# Patient Record
Sex: FEMALE | Race: WHITE | NOT HISPANIC OR LATINO | URBAN - METROPOLITAN AREA
[De-identification: names, ages, dates, MRNs, and addresses within clinical notes are randomized per-mention and may not be internally consistent; named-entity substitution may affect disease eponyms.]

---

## 2022-01-01 ENCOUNTER — TRANSFERRED RECORDS (OUTPATIENT)
Dept: HEALTH INFORMATION MANAGEMENT | Facility: CLINIC | Age: 47
End: 2022-01-01

## 2023-01-16 ASSESSMENT — ANXIETY QUESTIONNAIRES
2. NOT BEING ABLE TO STOP OR CONTROL WORRYING: NEARLY EVERY DAY
IF YOU CHECKED OFF ANY PROBLEMS ON THIS QUESTIONNAIRE, HOW DIFFICULT HAVE THESE PROBLEMS MADE IT FOR YOU TO DO YOUR WORK, TAKE CARE OF THINGS AT HOME, OR GET ALONG WITH OTHER PEOPLE: SOMEWHAT DIFFICULT
3. WORRYING TOO MUCH ABOUT DIFFERENT THINGS: NEARLY EVERY DAY
GAD7 TOTAL SCORE: 16
7. FEELING AFRAID AS IF SOMETHING AWFUL MIGHT HAPPEN: NEARLY EVERY DAY
5. BEING SO RESTLESS THAT IT IS HARD TO SIT STILL: SEVERAL DAYS
4. TROUBLE RELAXING: MORE THAN HALF THE DAYS
6. BECOMING EASILY ANNOYED OR IRRITABLE: SEVERAL DAYS
1. FEELING NERVOUS, ANXIOUS, OR ON EDGE: NEARLY EVERY DAY

## 2023-01-16 ASSESSMENT — PATIENT HEALTH QUESTIONNAIRE - PHQ9: SUM OF ALL RESPONSES TO PHQ QUESTIONS 1-9: 11

## 2023-01-17 ASSESSMENT — ANXIETY QUESTIONNAIRES
3. WORRYING TOO MUCH ABOUT DIFFERENT THINGS: NEARLY EVERY DAY
5. BEING SO RESTLESS THAT IT IS HARD TO SIT STILL: SEVERAL DAYS
GAD7 TOTAL SCORE: 16
8. IF YOU CHECKED OFF ANY PROBLEMS, HOW DIFFICULT HAVE THESE MADE IT FOR YOU TO DO YOUR WORK, TAKE CARE OF THINGS AT HOME, OR GET ALONG WITH OTHER PEOPLE?: SOMEWHAT DIFFICULT
2. NOT BEING ABLE TO STOP OR CONTROL WORRYING: NEARLY EVERY DAY
GAD7 TOTAL SCORE: 16
6. BECOMING EASILY ANNOYED OR IRRITABLE: SEVERAL DAYS
IF YOU CHECKED OFF ANY PROBLEMS ON THIS QUESTIONNAIRE, HOW DIFFICULT HAVE THESE PROBLEMS MADE IT FOR YOU TO DO YOUR WORK, TAKE CARE OF THINGS AT HOME, OR GET ALONG WITH OTHER PEOPLE: SOMEWHAT DIFFICULT
4. TROUBLE RELAXING: MORE THAN HALF THE DAYS
GAD7 TOTAL SCORE: 16
1. FEELING NERVOUS, ANXIOUS, OR ON EDGE: NEARLY EVERY DAY
7. FEELING AFRAID AS IF SOMETHING AWFUL MIGHT HAPPEN: NEARLY EVERY DAY
7. FEELING AFRAID AS IF SOMETHING AWFUL MIGHT HAPPEN: NEARLY EVERY DAY

## 2023-01-17 ASSESSMENT — PATIENT HEALTH QUESTIONNAIRE - PHQ9
SUM OF ALL RESPONSES TO PHQ QUESTIONS 1-9: 11
10. IF YOU CHECKED OFF ANY PROBLEMS, HOW DIFFICULT HAVE THESE PROBLEMS MADE IT FOR YOU TO DO YOUR WORK, TAKE CARE OF THINGS AT HOME, OR GET ALONG WITH OTHER PEOPLE: SOMEWHAT DIFFICULT
SUM OF ALL RESPONSES TO PHQ QUESTIONS 1-9: 11

## 2023-01-18 PROBLEM — J30.2 SEASONAL ALLERGIC RHINITIS: Status: ACTIVE | Noted: 2023-01-18

## 2023-01-18 RX ORDER — SERTRALINE HYDROCHLORIDE 100 MG/1
100 TABLET, FILM COATED ORAL DAILY
COMMUNITY
End: 2023-01-19

## 2023-01-18 RX ORDER — VALACYCLOVIR HYDROCHLORIDE 500 MG/1
TABLET, FILM COATED ORAL
COMMUNITY
End: 2023-01-19

## 2023-01-18 NOTE — PROGRESS NOTES
"CC: Establish Care (Medication refill for Zoloft, establish care.//Patient has mass/lump underneath left arm after COVID vaccine. Curious about next steps.)      SUBJECTIVE: Marielos is a 47 year old female who comes in to [facility] care. Prior PCP in [place] Records received and reviewed.Recent move from [facility] in December 2021. She works as a  at law firm. Concerns today:     Axillary lump in left axilla.   She noticed this after her first Covid vaccine.  Then, she went to have a routine mammogram. They biopsied the area in Sep 2021 and said it was benign. It will swell whenever she gets a vaccine in that arm. Otherwise not growing or changing but she would like to have this removed.   No breast lumps or masses but is due for her routine mammogram screening.       Anxiety and mild depression. Long-standing diagnosis.   - Medications: Has been treating with sertraline 100 mg daily. No adverse medication effects.   - Counseling: Patient is not currently seeing a therapist/counselor. But is interested in re-establishing with a therapist.   - Mother is on sertraline as well.   - Past medication trials: escitalopram caused sleepiness.     Genital HSV. Takes valacyclovir 500 mg daily for suppression and working well.     PMH, PSH, FH, allergies, and medications reviewed and updated in Epic.       OBJECTIVE:   /77 (BP Location: Right arm, Patient Position: Sitting, Cuff Size: Adult Regular)   Pulse 67   Temp 97.9  F (36.6  C) (Skin)   Resp 16   Ht 1.575 m (5' 2\")   Wt 63.1 kg (139 lb 1.3 oz)   LMP 01/08/2023 (Exact Date)   SpO2 98%   BMI 25.44 kg/m    Alert and oriented in no acute distress. No right axillary adenopathy or masses, left axilla with a discrete approx 2 cm mass, non-tender, no overlying skin changes.     Answers for HPI/ROS submitted by the patient on 1/17/2023  If you checked off any problems, how difficult have these problems made it for you to do your work, take care of things at home, " or get along with other people?: Somewhat difficult  PHQ9 TOTAL SCORE: 11  MARY 7 TOTAL SCORE: 16      ASSESSMENT/PLAN:   Marielos was seen today for establish care.    Encounter to establish care  PMH, PSH, FH, SH, medications, and allergies reviewed and updated in Epic.   Mammogram ordered - due for screening.     Anxiety and depression  The current medical regimen is effective;  continue present plan and medications. Referral to [facility] for counseling.   -     sertraline (ZOLOFT) 100 MG tablet; Take 1 tablet (100 mg) by mouth daily    Recurrent HSV (herpes simplex virus)  The current medical regimen is effective;  continue present plan and medications.  -     valACYclovir (VALTREX) 500 MG tablet; Take 1 tablet (500 mg) by mouth daily    Axillary lump, left  Will sign MILAGRO to obtain records from imaging and biopsy performed in fall 2021.       Geni Whalen MD  Family Medicine

## 2023-01-19 ENCOUNTER — OFFICE VISIT (OUTPATIENT)
Dept: FAMILY MEDICINE | Facility: CLINIC | Age: 48
End: 2023-01-19
Payer: COMMERCIAL

## 2023-01-19 VITALS
RESPIRATION RATE: 16 BRPM | HEART RATE: 67 BPM | DIASTOLIC BLOOD PRESSURE: 77 MMHG | SYSTOLIC BLOOD PRESSURE: 122 MMHG | BODY MASS INDEX: 25.6 KG/M2 | OXYGEN SATURATION: 98 % | TEMPERATURE: 97.9 F | WEIGHT: 139.08 LBS | HEIGHT: 62 IN

## 2023-01-19 DIAGNOSIS — Z12.31 ENCOUNTER FOR SCREENING MAMMOGRAM FOR BREAST CANCER: ICD-10-CM

## 2023-01-19 DIAGNOSIS — R22.32 AXILLARY LUMP, LEFT: ICD-10-CM

## 2023-01-19 DIAGNOSIS — Z76.89 ENCOUNTER TO ESTABLISH CARE: Primary | ICD-10-CM

## 2023-01-19 DIAGNOSIS — F41.9 ANXIETY AND DEPRESSION: ICD-10-CM

## 2023-01-19 DIAGNOSIS — F32.A ANXIETY AND DEPRESSION: ICD-10-CM

## 2023-01-19 DIAGNOSIS — B00.9 RECURRENT HSV (HERPES SIMPLEX VIRUS): ICD-10-CM

## 2023-01-19 RX ORDER — SERTRALINE HYDROCHLORIDE 100 MG/1
100 TABLET, FILM COATED ORAL DAILY
Qty: 90 TABLET | Refills: 3 | Status: SHIPPED | OUTPATIENT
Start: 2023-01-19 | End: 2024-01-03

## 2023-01-19 RX ORDER — VALACYCLOVIR HYDROCHLORIDE 500 MG/1
500 TABLET, FILM COATED ORAL DAILY
Qty: 90 TABLET | Refills: 3 | Status: SHIPPED | OUTPATIENT
Start: 2023-01-19 | End: 2024-01-11

## 2023-01-19 NOTE — Clinical Note
Hi! Marielos established care today ([place] relatively stable anxiety on sertraline 100 mg daily but dad  last year, uncle  last month, so some recent stressors and she would like to meet with you. She has an appt scheduled. Thanks!

## 2023-01-19 NOTE — NURSING NOTE
"47 year old  Chief Complaint   Patient presents with     Establish Care     Medication refill for Zoloft, establish care.    Patient has mass/lump underneath left arm after COVID vaccine. Curious about next steps.       Blood pressure 122/77, pulse 67, temperature 97.9  F (36.6  C), temperature source Skin, resp. rate 16, height 1.575 m (5' 2\"), weight 63.1 kg (139 lb 1.3 oz), last menstrual period 01/08/2023, SpO2 98 %. Body mass index is 25.44 kg/m .  Patient Active Problem List   Diagnosis     Seasonal allergic rhinitis     Depressive disorder     Herpes simplex       Wt Readings from Last 2 Encounters:   01/19/23 63.1 kg (139 lb 1.3 oz)     BP Readings from Last 3 Encounters:   01/19/23 122/77         Current Outpatient Medications   Medication     sertraline (ZOLOFT) 100 MG tablet     valACYclovir (VALTREX) 500 MG tablet     No current facility-administered medications for this visit.       Social History     Tobacco Use     Smoking status: Never     Smokeless tobacco: Never   Vaping Use     Vaping Use: Never used   Substance Use Topics     Alcohol use: Yes     Comment: Moderate; glass of wine with dinner sometimes. 2-3 in a sitting.     Drug use: Yes     Types: Marijuana     Comment: Edible       Health Maintenance Due   Topic Date Due     YEARLY PREVENTIVE VISIT  Never done     ADVANCE CARE PLANNING  Never done     MAMMO SCREENING  Never done     HEPATITIS B IMMUNIZATION (1 of 3 - 3-dose series) Never done     COLORECTAL CANCER SCREENING  Never done     HIV SCREENING  Never done     HEPATITIS C SCREENING  Never done     PAP  Never done     DTAP/TDAP/TD IMMUNIZATION (2 - Td or Tdap) 02/22/2022     COVID-19 Vaccine (2 - Pfizer series) 10/04/2022     LIPID  Never done       No results found for: PAP      January 19, 2023 11:18 AM    "

## 2023-01-25 NOTE — PROGRESS NOTES
"    [facility]      PATIENT'S NAME: Marielos Marti  PREFERRED NAME: Marielos  PRONOUNS:  she/her and they    MRN: [id number]  : 1975  ADDRESS: [address]  [facility] [address]  ACCT. NUMBER:  [id number]  DATE OF SERVICE: 23  START TIME: 3:31 pm  END TIME: 4:30 pm  PREFERRED PHONE: [phone]  May we leave a program related message: Yes  SERVICE MODALITY:  Video Visit:      Provider verified identity through the following two step process.  Patient provided:  Patient  and Patient address    Telemedicine Visit: The patient's condition can be safely assessed and treated via synchronous audio and visual telemedicine encounter.      Reason for Telemedicine Visit: Patient has requested telehealth visit    Originating Site (Patient Location): Patient's home    Distant Site (Provider Location): [facility]    Consent:  The patient/guardian has verbally consented to: the potential risks and benefits of telemedicine (video visit) versus in person care; bill my insurance or make self-payment for services provided; and responsibility for payment of non-covered services.     Patient would like the video invitation sent by:  My Chart    Mode of Communication:  [facility] Conference [facility]    Distant Location (Provider):  On-site    As the provider I attest to compliance with applicable laws and regulations related to telemedicine.    UNIVERSAL ADULT Mental Health DIAGNOSTIC ASSESSMENT    Identifying Information:  Patient is a 47 year old,   individual.  Patient was referred for an assessment by primary care clinic.  Patient attended the session alone.      Chief Complaint:    The reason for seeking services at this time is: \"Anxiety/depression\".  The problem(s) began 89.   Patient has attempted to resolve these concerns in the past through medication.    When describing her mental health, Marielos stated, \"In the past year its ramped up\", sharing " "she's experienced significant changes/stressors over the past several years, including the pandemic, moving to [place] in December 2021, father passed away in March 2022, her partner's father is experiencing health issues (partner and Marielos are assisting with care), and her uncle recently passed away.   Marielos reported her and her partner are still adjusting to the move and the weather (lacks robust social support system; \"loneliness\"; \"I'm really missing nice weather\"), and sometimes her work lacks a healthy approach to stress management and mental health.  Over the past year, Marielos stated she's constantly tried to keep moving forward and \"I haven't taken time to process anything that happended in the last year\".     Marielos reported history of anxiety and low mood since childhood, stating \"they (the anxiety and depression) have always gotten in the way\".  When describing them, she reported the anxiety and depression are closely linked, but \"its seems like anxiety is the bigger issue\" and the anxiety is often \"the  (of the bus)\".   Marielos believes she experienced depressive episodes in high school, but clearly recalled a depressive episode during her early 20's.  Marielos experienced her first panic attack at age 24 and her most recent occurred in November 2021 (preparing to move across the country).  Marielos reported panic attacks are triggered by very stressful incidents. Marielos reported she first started selective serotonin reuptake inhibitor at age 32 (attempted escitalopram, switched to and continues to take sertraline) and believes the medication is very helpful, stating \"the S.S.R.I's keep it in check (anxiety symptoms)\", explaining how prior to or when not taking medication the anxiety symptoms can \"spiral\", she can feel the symptoms increasing in her body, sometimes \"It feels like a knot\", and becomes overwhelming.      Marielos reported experiencing passive SI in 2012, denied ever having intent or plan.  " "Marielos reported she sought therapy (and was taking medication) and it was very helpful.  Marielos denied SI since.  Marielos denied SIB, SA, or psychiatric hospitalizations. Marielos denied any current safety concerns.  Marielos denied past/current problematic alcohol or substance use.     Marielos endorsed the following:     Anxiety:  -excessive worry  -rumination  -difficulty initiating sleep  -restless  -irritable  -Muscle Tension: used to get acupuncture and massage  -past jaw clenching  -gi upset  (triggered by stressful incident)  -chest tightness and breathing issues  (triggered by stressful incident)  -increased heart rate (triggered by stressful incident)  -decreased concentration  -fatigue    Depression  -low mood  -difficulty initiating sleep  -changes in appetite  -guilt/self-worth  -poor concentration  -anhedonia  -fatigue    Sleep:  -difficulty falling asleep  -read prior to bed  -puts phone in another room  -restlessness when falling asleep    Social/Family History:  Patient reported they grew up in other [place].  They were raised by biological parents; grandmother; grandfather; aunt  .  Parents were always together.  Patient reported that their childhood was \"I didn't really fit in when growing up\", \"I was weird, quiet, small, very different from the other [facility] kids\"..  Patient described their current relationships with family of origin as \"pretty good\".    Brother: , twin children, resides outside of [place], [place]     The patient describes their cultural background as .  Cultural influences and impact on patient's life structure, values, norms, and healthcare: [place] to feelings of isolation and otherness, especially since many people in the area were very conservative, including family members.  Contextual influences on patient's health include: Contextual Factors: Family Factors family history of mental health.    These factors will be " addressed in the Preliminary Treatment plan. Patient identified their preferred language to be English. Patient reported they does not need the assistance of an  or other support involved in therapy.     Patient reported had no significant delays in developmental tasks.   Patient's highest education level was graduate school  .  Patient identified the following learning problems: none reported.  Modifications will not be used to assist communication in therapy.  Patient reports they are  able to understand written materials.    Patient reported the following relationship history: none.    Patient's current relationship status is has a partner or significant other for 14 years .   Patient identified their sexual orientation as heterosexual.  Patient reported having  no child(aj). Patient identified partner; mother; pets; friends; co-worker as part of their support system.  Patient identified the quality of these relationships as fair,  .      Patient's current living/housing situation involves staying in own home/apartment.  The immediate members of family and household include Marek Vasquez,partner and they report that housing is stable.    Patient is currently employed fulltime.  Patient reports their finances are obtained through employment. Patient does identify finances as a current stressor.      Patient reported that they have not been involved with the legal system. . Patient does not report being under probation/ parole/ jurisdiction. They are not under any current court jurisdiction. .    Patient's Strengths and Limitations:  Patient identified the following strengths or resources that will help them succeed in treatment: family support, insight, intelligence, sense of humor and work ethic. Things that may interfere with the patient's success in treatment include: few friends.     Assessments:  The following assessments were completed by patient for this visit:  PHQ9:   PHQ-9 SCORE  1/16/2023 1/16/2023 1/17/2023   PHQ-9 Total Score MyChart - 11 (Moderate depression) 11 (Moderate depression)   PHQ-9 Total Score 11 11 11     GAD7:   MARY-7 SCORE 1/16/2023 1/16/2023 1/17/2023   Total Score - 16 (severe anxiety) 16 (severe anxiety)   Total Score 16 16 16     CAGE-AID:   CAGE-AID Total Score 1/23/2023   Total Score 0   Total Score MyChart 0 (A total score of 2 or greater is considered clinically significant)     PROMIS 10-Global Health (only subscores and total score):   PROMIS-10 Scores Only 1/25/2023   Global Mental Health Score 10   Global Physical Health Score 13   PROMIS TOTAL - SUBSCORES 23       Personal and Family Medical History:  Patient does report a family history of mental health concerns; Paternal and maternal family history of mental health issues; father-anxiety, mother-depression. Patient reports family history includes Hypothyroidism in her brother and mother; Pulmonary Embolism in her father..     Patient does report Mental Health Diagnosis and/or Treatment.  Patient Patient reported the following previous diagnoses which include(s): depression .  Patient reported symptoms began during childhood.  Patient has received mental health services in the past:  therapy  .  Psychiatric Hospitalizations: none. Patient denies a history of civil commitment.  Currently, patient none  receiving other mental health services     Patient has had a physical exam to rule out medical causes for current symptoms.  Date of last physical exam was within the past year. Client was encouraged to follow up with PCP if symptoms were to develop. The patient has a [facility] Primary Care Provider, who is named Geni Whalen.  Patient reports no current medical concerns.  Patient denies any issues with pain..   There are not significant appetite / nutritional concerns / weight changes.   Patient does not report a history of head injury / trauma / cognitive impairment.      Patient reports current meds as:    Outpatient Medications Marked as Taking for the 1/27/23 encounter (Virtual Visit) with Ullrich, Shawn G, LICSW   Medication Sig     sertraline (ZOLOFT) 100 MG tablet Take 1 tablet (100 mg) by mouth daily       Medication Adherence:  Patient reports   taking prescribed medications as prescribed.    Patient Allergies:    Allergies   Allergen Reactions     Seasonal Allergies        Medical History:    Past Medical History:   Diagnosis Date     Depression      Herpes simplex virus infection     genital     Seasonal allergic rhinitis          Current Mental Status Exam:   Appearance:  Appropriate    Eye Contact:  Good   Psychomotor:  Normal       Gait / station:  Unable to assess  Attitude / Demeanor: Cooperative   Speech      Rate / Production: Normal/ Responsive      Volume:  Normal  volume      Language:  intact  Mood:   Anxious  Depressed   Affect:   Worrisome    Thought Content: Clear   Thought Process: Coherent       Associations: No loosening of associations  Insight:   Good   Judgment:  Intact   Orientation:  All  Attention/concentration: Fair      Substance Use:  Patient did report a family history of substance use concerns; paternal uncle-alcohol; see medical history section for details.  Patient has not received chemical dependency treatment in the past.  Patient has not ever been to detox.      Patient is not currently receiving any chemical dependency treatment.         Substance History of use Age of first use Date of last use     Pattern and duration of use (include amounts and frequency)   Alcohol currently use   20 01/22/23 3-4x's/week; 1-2 drinks per episode   Cannabis   currently use 20 01/20/23 1x/week      Amphetamines   never used     REPORTS SUBSTANCE USE: N/A   Cocaine/crack    never used       REPORTS SUBSTANCE USE: N/A   Hallucinogens used in the past   23 01/01/11  REPORTS SUBSTANCE USE: N/A   Inhalants never used         REPORTS SUBSTANCE USE: N/A   Heroin never used         REPORTS  SUBSTANCE USE: N/A   Other Opiates never used     REPORTS SUBSTANCE USE: N/A   Benzodiazepine   used in the past 39 10/15/22 None current   Barbiturates never used     REPORTS SUBSTANCE USE: N/A   Over the counter meds used in the past 20 01/12/23 To address ailment; no abuse   Caffeine currently use 18   2-3 cups per day   Nicotine  never used     REPORTS SUBSTANCE USE: N/A   Other substances not listed above:  Identify:  never used     REPORTS SUBSTANCE USE: N/A     Patient reported the following problems as a result of their substance use: no problems, not applicable.    Substance Use: No symptoms    Based on the negative CAGE score and clinical interview there  are not indications of drug or alcohol abuse.      Significant Losses / Trauma / Abuse / Neglect Issues:   Patient did not  serve in the .  There are indications or report of significant loss, trauma, abuse or neglect issues related to: pt reported significant bullying during childhood.  Concerns for possible neglect are not present.    Safety Assessment:   Patient denies current homicidal ideation and behaviors.  Patient denies current self-injurious ideation and behaviors.    Patient denied risk behaviors associated with substance use.  Patient denies any high risk behaviors associated with mental health symptoms.  Patient reports the following current concerns for their personal safety: None.  Patient reports there are not firearms in the house.       History of Safety Concerns:  Patient denied a history of homicidal ideation.     Patient denied a history of personal safety concerns.    Patient denied a history of assaultive behaviors.    Patient denied a history of sexual assault behaviors.     Patient denied a history of risk behaviors associated with substance use.  Patient denies any history of high risk behaviors associated with mental health symptoms.  Patient reports the following protective factors: safe and stable environment; regular  sleep; help seeking behaviors when distressed; adherence with prescribed medication; living with other people; sense of meaning; positive social skills; financial stability; access to a variety of clinical interventions and pets    Risk Plan:  See Recommendations for Safety and Risk Management Plan    Review of Symptoms per patient report:   Depression: Change in sleep, Lack of interest, Excessive or inappropriate guilt, Change in energy level, Difficulties concentrating, Change in appetite, Low self-worth, Ruminations, Irritability and Feeling sad, down, or depressed  Kimberly:  No Symptoms  Psychosis: No Symptoms  Anxiety: Excessive worry, Nervousness, Physical complaints, such as headaches, stomachaches, muscle tension, Sleep disturbance, Psychomotor agitation, Ruminations, Poor concentration and Irritability  Panic:  None recent  Post Traumatic Stress Disorder:  Experienced traumatic event see above   Eating Disorder: No Symptoms  ADD / ADHD:  No symptoms  Conduct Disorder: No symptoms  Autism Spectrum Disorder: No symptoms  Obsessive Compulsive Disorder: No Symptoms    Patient reports the following compulsive behaviors and treatment history: None.      Diagnostic Criteria:   Generalized Anxiety Disorder  A. Excessive anxiety and worry about a number of events or activities (such as work or school performance).   B. The person finds it difficult to control the worry.  C. Select 3 or more symptoms (required for diagnosis). Only one item is required in children.   - Restlessness or feeling keyed up or on edge.    - Being easily fatigued.    - Difficulty concentrating or mind going blank.    - Irritability.    - Muscle tension.    - Sleep disturbance (difficulty falling or staying asleep, or restless unsatisfying sleep).   D. The focus of the anxiety and worry is not confined to features of an Axis I disorder.  E. The anxiety, worry, or physical symptoms cause clinically significant distress or impairment in social,  occupational, or other important areas of functioning.   F. The disturbance is not due to the direct physiological effects of a substance (e.g., a drug of abuse, a medication) or a general medical condition (e.g., hyperthyroidism) and does not occur exclusively during a Mood Disorder, a Psychotic Disorder, or a Pervasive Developmental Disorder.    - The aformentioned symptoms began 30 year(s) ago and occurs 7 days per week and is experienced as moderate. Major Depressive Disorder  A) Recurrent episode(s) - symptoms have been present during the same 2-week period and represent a change from previous functioning 5 or more symptoms (required for diagnosis)   - Depressed mood. Note: In children and adolescents, can be irritable mood.     - Diminished interest or pleasure in all, or almost all, activities.    - Significant weight gaindecrease in appetite.    - Decreased sleep.    - Fatigue or loss of energy.    - Feelings of worthlessness or inappropriate and excessive guilt.    - Diminished ability to think or concentrate, or indecisiveness.   B) The symptoms cause clinically significant distress or impairment in social, occupational, or other important areas of functioning  C) The episode is not attributable to the physiological effects of a substance or to another medical condition  D) The occurence of major depressive episode is not better explained by other thought / psychotic disorders  E) There has never been a manic episode or hypomanic episode    Functional Status:  Patient reports the following functional impairments:  educational activities, health maintenance, home life with ,, management of the household and or completion of tasks, money management, social interactions and work / vocational responsibilities.     Nonprogrammatic care:  Patient is requesting basic services to address current mental health concerns.     Clinical Summary:  1. Reason for assessment: referred by PCP and self, due to anxiety and  depression  2. Psychosocial, Cultural and Contextual Factors: Partnered, , Heterosexual, Female; moved to [place] a little over one year ago; family in [place] .  3. Principal DSM5 Diagnoses  (Sustained by DSM5 Criteria Listed Above):   300.02 (F41.1) Generalized Anxiety Disorder.  4. Other Diagnoses that is relevant to services:   296.32 (F33.1) Major Depressive Disorder, Recurrent Episode, Moderate With anxious distress.  5. Provisional Diagnosis: NA  6. Prognosis: Expect Improvement, Relieve Acute Symptoms and Maintain Current Status / Prevent Deterioration.  7. Likely consequences of symptoms if not treated: Continued/worsening symptoms and increased/prolonged functional impairment  8. Client strengths include:  educated, empathetic, employed, has a previous history of therapy, insightful, intelligent, support of family, friends and providers and work history .     Recommendations:     1. Plan for Safety and Risk Management:   Safety and Risk: Recommended that patient call 911 or go to the local ED should there be a change in any of these risk factors..          Report to child / adult protection services was NA.     2. Patient's identified mental health concerns with a cultural influence will be addressed by recognizing connection between the mind and body, and addressing whole person health.     3. Initial Treatment will focus on:    Depressed Mood - symptoms, warning signs, and coping strategies  Anxiety - symptoms, warning signs, and coping strategies.     4. Resources/Service Plan:    services are not indicated.   Modifications to assist communication are not indicated.   Additional disability accommodations are not indicated.      5. Collaboration:   Collaboration / coordination of treatment will be initiated with the following  support professionals: primary care physician.      6.  Referrals:   The following referral(s) will be initiated: [facility]. Next appt: 2/1/23 with  [facility]       A Release of Information has been obtained for the following: NA.     Emergency Contact  was not obtained.      Clinical Substantiation/medical necessity for the above recommendations: Marielos reported over the past year she has experienced a significant increase of both anxiety and depressive symptoms.  Marielos reported long history of anxiety and depressed mood, and over the past year or so, she has experienced significant stressors, including moving to [place], adjusting to new city during pandemic, has not developed local support system, passing of father, and the passing of uncle.  Marielos reported the symptoms have negatively impacted functioning in multiple life areas, including educational activities, health maintenance, home life, management of the household and or completion of tasks, money management, social interactions and work / vocational responsibilities.   [facility] recommended pt continue with medication and participate in outpatient therapy services.        7. NEGRITO:    NEGRITO:  Discussed the general effects of drugs and alcohol on health and well-being. Provider educated patient about the effects of chemical use on their health and well being. Recommendations:  Reduce use and/or abstain from alcohol and cannabis use .     8. Records:   These were reviewed at time of assessment.   Information in this assessment was obtained from the medical record and provided by patient who is a good historian.   Patient will have open access to their mental health medical record.    9.   Interactive Complexity: No      Provider Name/ Credentials:  Shawn Ullrich LICSW, [facility]  January 27, 2023

## 2023-01-27 ENCOUNTER — VIRTUAL VISIT (OUTPATIENT)
Dept: BEHAVIORAL HEALTH | Facility: CLINIC | Age: 48
End: 2023-01-27
Payer: COMMERCIAL

## 2023-01-27 DIAGNOSIS — F33.1 MAJOR DEPRESSIVE DISORDER, RECURRENT EPISODE, MODERATE (H): ICD-10-CM

## 2023-01-27 DIAGNOSIS — F41.1 GAD (GENERALIZED ANXIETY DISORDER): Primary | ICD-10-CM

## 2023-01-27 ASSESSMENT — COLUMBIA-SUICIDE SEVERITY RATING SCALE - C-SSRS
1. IN THE PAST MONTH, HAVE YOU WISHED YOU WERE DEAD OR WISHED YOU COULD GO TO SLEEP AND NOT WAKE UP?: NO
ATTEMPT LIFETIME: NO
6. HAVE YOU EVER DONE ANYTHING, STARTED TO DO ANYTHING, OR PREPARED TO DO ANYTHING TO END YOUR LIFE?: NO
TOTAL  NUMBER OF INTERRUPTED ATTEMPTS LIFETIME: NO
REASONS FOR IDEATION LIFETIME: DOES NOT APPLY
2. HAVE YOU ACTUALLY HAD ANY THOUGHTS OF KILLING YOURSELF?: NO
1. HAVE YOU WISHED YOU WERE DEAD OR WISHED YOU COULD GO TO SLEEP AND NOT WAKE UP?: YES
TOTAL  NUMBER OF ABORTED OR SELF INTERRUPTED ATTEMPTS LIFETIME: NO

## 2023-01-31 ASSESSMENT — ANXIETY QUESTIONNAIRES
GAD7 TOTAL SCORE: 9
8. IF YOU CHECKED OFF ANY PROBLEMS, HOW DIFFICULT HAVE THESE MADE IT FOR YOU TO DO YOUR WORK, TAKE CARE OF THINGS AT HOME, OR GET ALONG WITH OTHER PEOPLE?: SOMEWHAT DIFFICULT
3. WORRYING TOO MUCH ABOUT DIFFERENT THINGS: MORE THAN HALF THE DAYS
7. FEELING AFRAID AS IF SOMETHING AWFUL MIGHT HAPPEN: SEVERAL DAYS
GAD7 TOTAL SCORE: 9
4. TROUBLE RELAXING: MORE THAN HALF THE DAYS
GAD7 TOTAL SCORE: 9
1. FEELING NERVOUS, ANXIOUS, OR ON EDGE: MORE THAN HALF THE DAYS
5. BEING SO RESTLESS THAT IT IS HARD TO SIT STILL: NOT AT ALL
2. NOT BEING ABLE TO STOP OR CONTROL WORRYING: MORE THAN HALF THE DAYS
IF YOU CHECKED OFF ANY PROBLEMS ON THIS QUESTIONNAIRE, HOW DIFFICULT HAVE THESE PROBLEMS MADE IT FOR YOU TO DO YOUR WORK, TAKE CARE OF THINGS AT HOME, OR GET ALONG WITH OTHER PEOPLE: SOMEWHAT DIFFICULT
7. FEELING AFRAID AS IF SOMETHING AWFUL MIGHT HAPPEN: SEVERAL DAYS
6. BECOMING EASILY ANNOYED OR IRRITABLE: NOT AT ALL

## 2023-01-31 ASSESSMENT — PATIENT HEALTH QUESTIONNAIRE - PHQ9
SUM OF ALL RESPONSES TO PHQ QUESTIONS 1-9: 7
10. IF YOU CHECKED OFF ANY PROBLEMS, HOW DIFFICULT HAVE THESE PROBLEMS MADE IT FOR YOU TO DO YOUR WORK, TAKE CARE OF THINGS AT HOME, OR GET ALONG WITH OTHER PEOPLE: SOMEWHAT DIFFICULT
SUM OF ALL RESPONSES TO PHQ QUESTIONS 1-9: 7

## 2023-01-31 NOTE — PROGRESS NOTES
[facility]  2023      Behavioral Health Clinician Progress Note    Patient Name: Marielos Marti           Service Type:  Individual      Service Location:   Telehealth; see above     Session Start Time: 4:01 pm  Session End Time: 4:50 pm      Session Length: 38 - 52      Attendees: Patient     Service Modality:  Video Visit:      Provider verified identity through the following two step process.  Patient provided:  Patient , Patient address and Patient is known previously to provider    Telemedicine Visit: The patient's condition can be safely assessed and treated via synchronous audio and visual telemedicine encounter.      Reason for Telemedicine Visit: Patient has requested telehealth visit    Originating Site (Patient Location): Patient's home    Distant Site (Provider Location): [facility]    Consent:  The patient/guardian has verbally consented to: the potential risks and benefits of telemedicine (video visit) versus in person care; bill my insurance or make self-payment for services provided; and responsibility for payment of non-covered services.     Patient would like the video invitation sent by:  My Chart    Mode of Communication:  [facility]    Distant Location (Provider):  On-site    As the provider I attest to compliance with applicable laws and regulations related to telemedicine.    Visit Activities (Refresh list every visit): [facility] [place] Only    Diagnostic Assessment Date: 23  Treatment Plan Review Date: 23  CGI Review Date: 23  Promis 10 Review Date:  23    Clinical Global Impressions  First:  Considering your total clinical experience with this particular patient population, how severe are the patient's symptoms at this time?: 4 (2023  5:02 PM)    Most recent:  No data recorded    See Flowsheets for today's PHQ-9 and MARY-7 results  Previous PHQ-9:   PHQ-9 SCORE 2023   PHQ-9 Total Score MyChart 11  "(Moderate depression) 11 (Moderate depression) 7 (Mild depression)   PHQ-9 Total Score 11 11 7     Previous MARY-7:   MARY-7 SCORE 1/16/2023 1/17/2023 1/31/2023   Total Score 16 (severe anxiety) 16 (severe anxiety) 9 (mild anxiety)   Total Score 16 16 9       DERIAN LEVEL:  No flowsheet data found.    DATA  Extended Session (60+ minutes): No  Interactive Complexity: No  Crisis: No  [facility] Patient: No    Treatment Objective(s) Addressed in This Session:  Target Behavior(s): increase socialization and activity level, and be more present    Depressed Mood: Increase interest, engagement, and pleasure in doing things  Decrease frequency and intensity of feeling down, depressed, hopeless  Feel less tired and more energy during the day   Identify negative self-talk and behaviors: challenge core beliefs, myths, and actions  Anxiety: will experience a reduction in anxiety, will develop more effective coping skills to manage anxiety symptoms, will develop healthy cognitive patterns and beliefs and will increase ability to function adaptively    Current Stressors / Issues:    When inquiring what is different this week compared to last, Marielos reported taking care of some tasks that she had been putting off, including got caught up on work (some use as avoidance also), baked blueberry muffins for father-in-law, and talked to her mother (\"that's the most important thing\").  Marielos reported some of these things were difficult and she didn't enjoy them as much as she would've liked, but she is focusing on the benefits of completing the tasks (ie sense of relief).  [facility] used MI interventions to maintain focus on change/action by engaging Marielos in goal identification and treatment planning.  Marielos readily responded by sharing she created annual goals in January, stating one of her primary goals is \"finding my community in the [employer]\".  Marielos stated [place] is now her home, which means she should have a social support system in the area.  " "[facility] validated how difficult this can be, teja affirmed (\"it can be really hard to put myself out there\") and further stated, \"I need to work thru my anxiety around that (putting myself out there)\".  Teja shared a list options to increase socialization and build community:    -I was thinking about volunteering at animal shelter  -art class  -cross country skiing with others  -biking with others  -Volunteer with housing support program    In addition to the anxiety and how that negatively impacts her ability to engage with new people, Deanna shared another barrier of needing to help partner's family and being there for them.  Despite the responsibility of needing to help support her partner's family, Teja expressed change talk stating, \"I still need time for myself\".       [facility] and Teja then identified specific actions she can take that will lead her taking time for herself, building her community, and reducing anxiety.  Teja reported the following:    Goal 1: Build Community-skiing  -Got brand new xc skis for as and has not used skis yet  -\"I want to ski before the end of season\"  -she could then find a group of people to ski with  1. Buy a ski pass   2. Set time and explore the trail before skiing with others   -will do with partner this weekend  3. Find a \"Beginner group\" to ski with  Change Talk: \"This is a good idea\"    Goal 2: reduce anxiety   If anxiety was reduced, how would we know it?  -\"Being still and being focused\"--more present in daily life   -Baking forces her to be present    -will bake something this weekend  -other signs she's less anxious   -\"shoulders de-slump\"    -less shaky   -calm    -\"I'm sitting still\"   -Less active on my devices      Progress on Treatment Objective(s) / Homework:  New Objective established this session - PREPARATION (Decided to change - considering how); Intervened by negotiating a change plan and determining options / strategies for behavior change, identifying " triggers, exploring social supports, and working towards setting a date to begin behavior change    Motivational Interviewing    MI Intervention: Co-Developed Goal: Increase socialization and activity level; be more present, Expressed Empathy/Understanding, Supported Autonomy, Collaboration, Evocation, Open-ended questions, Reflections: simple and complex and Change talk (evoked)     Change Talk Expressed by the Patient: Desire to change Ability to change Reasons to change Need to change    Provider Response to Change Talk: E - Evoked more info from patient about behavior change, A - Affirmed patient's thoughts, decisions, or attempts at behavior change, R - Reflected patient's change talk and S - Summarized patient's change talk statements    Also provided psychoeducation about behavioral health condition, symptoms, and treatment options    Care Plan review completed: Yes    Medication Review:  No changes to current psychiatric medication(s)    Medication Compliance:  Yes    Changes in Health Issues:   None reported    Chemical Use Review:   Substance Use: Chemical use reviewed, no active concerns identified      Tobacco Use: No current tobacco use.      Assessment: Current Emotional / Mental Status (status of significant symptoms):  Risk status (Self / Other harm or suicidal ideation)  Patient has had a history of suicidal ideation: passive, never intent or plan; no SIB, no SA; most recent passive SI was years ago  Patient denies current fears or concerns for personal safety.  Patient denies current or recent suicidal ideation or behaviors.  Patient denies current or recent homicidal ideation or behaviors.  Patient denies current or recent self injurious behavior or ideation.  Patient denies other safety concerns.  A safety and risk management plan has not been developed at this time, however patient was encouraged to call [place] [phone] should there be a change in any of these risk  "factors.    Appearance:   Appropriate   Eye Contact:   Good   Psychomotor Behavior: Normal   Attitude:   Cooperative   Orientation:   All  Speech   Rate / Production: Normal    Volume:  Normal   Mood:    Anxious , low  Affect:    Worrisome   Thought Content:  Clear   Thought Form:  Coherent  Logical   Insight:    Good     Diagnoses:  1. Generalized anxiety disorder        Collateral Reports Completed:  Routed note to PCP    Plan: (Homework, other):  Patient was given information about behavioral services and encouraged to schedule a follow up appointment with the clinic [facility] in 2 weeks.  She was also given information about mental health symptoms and treatment options .  CD Recommendations: No indications of CD issues.  Shawn Ullrich [facility], [facility] [place]      ______________________________________________________________________    Integrated Primary Care Behavioral Health Treatment Plan    Patient's Name: Marielos Marti  YOB: 1975    Date of Creation: 2/1/23  Date Treatment Plan Last Reviewed/Revised: 2/1/23    DSM5 Diagnoses: 296.32 (F33.1) Major Depressive Disorder, Recurrent Episode, Moderate _ or 300.02 (F41.1) Generalized Anxiety Disorder  Psychosocial / Contextual Factors: , Partnered, Female, heterosexual; full-time employment; Moved to [place] over 1 year ago  PROMIS (reviewed every 90 days): pt completed on 1/25/23    Referral / Collaboration:  Referral to another professional/service is not indicated at this time..    Anticipated number of session for this episode of care: 8  Anticipation frequency of session: Every other week  Anticipated Duration of each session: 38-52 minutes  Treatment plan will be reviewed in 90 days or when goals have been changed.       MeasurableTreatment Goal(s) related to diagnosis / functional impairment(s)  Goal 1: Patient will decrease anxiety and improve mood by developing community/supports in the area.     I will know I've met my goal when :  \"finding my " "community in the [facility]\".    Goal 1: Build Community-skiing  -Got brand new xc skis for xmas and has not used skis yet  -\"I want to ski before the end of season\"  -she could then find a group of people to ski with  1. Buy a ski pass   2. Set time and explore the trail before skiing with others   -will do with partner this weekend  3. Find a \"Beginner group\" to ski with  Change Talk: \"This is a good idea\"      Objective #A (Patient Action)    Patient will attend and participate in social or recreational activities to build community.  Status: New - Date: 2/1/23     Intervention(s)  Therapist will assign homework to brainstorm options to develop community and take one action that gets her closer to her goal weekly.       Goal 2: Patient will reduce anxiety by being more present in daily life; evidenced by learning 3 activities/strategies that promote being present     I will know I've met my goal when : \"Being still and being focused\"  -more present in daily life   -Baking forces her to be present    -will bake something this weekend  -other signs she's less anxious    -\"shoulders de-slump\"    -less shaky   -calm    -\"I'm sitting still\"   -Less active on my devices    Objective #A (Patient Action)    Status: New - Date: 2/1/23     Patient will practice one mindfulness activity daily.    Intervention(s)  Therapist will teach mindfulness activities and strategies.          Patient has reviewed and agreed to the above plan.      Shawn G. Ullrich, [facility]  February 1, 2023    "

## 2023-02-01 ENCOUNTER — VIRTUAL VISIT (OUTPATIENT)
Dept: BEHAVIORAL HEALTH | Facility: CLINIC | Age: 48
End: 2023-02-01
Payer: COMMERCIAL

## 2023-02-01 DIAGNOSIS — F41.1 GENERALIZED ANXIETY DISORDER: Primary | ICD-10-CM

## 2023-02-02 NOTE — PROGRESS NOTES
"CC: Physical (Lump under arm, discuss. Derm questions. Allergy management. )      Marielos is a 47 year old female who presents to clinic for an annual exam.     New concerns:  Skin/age spots -- Wondering about dermatology. Skin spots are not growing or changing.      Sinus headaches -- Moved from .. and has noticed that the drier weather has affected her sinuses a bit more. Uses a Nettipot and saline solution. Will use a humidifier throughout the house. But will still get a sinus headache, uses Excedrin for symptoms. Sometimes will take ibuprofen depending on the severity. Headache will start as a sinus headache then morph into a migraine -- caffeine, dark room. Feels like a spike in her temple. Pain  Behind the eye on one side. Has had migraines since she was a teenager. No aura. Lots of tension in the frontal sinuses. Not often (maybe once every 3 months) but when they happen it will take her out for 2-3 days. But no sinus infections in \"a long time.\"     Chronic health conditions:  Anxiety and mild depression. Long-standing diagnosis.   - Medications: Has been treating with sertraline 100 mg daily. No adverse medication effects.   - Counseling: Patient is currently seeing a therapist/counselor -- Shawn Ullrich.   - Mother is on sertraline as well.   - Past medication trials: escitalopram caused sleepiness.      Genital HSV. Takes valacyclovir 500 mg daily for suppression and working well.     We reviewed and updated her medication list as necessary. Her past medical and surgical history as well as her family history were reviewed and updated as necessary.    Gynecological history:  Menstrual symptoms: regular monthly menses  Last Pap:  9/17/2021, result Normal and negative HPV  History of abnormal Paps: Yes, about 20 years ago and normal on follow-up Pap  Concern for STIs: no  Safety: Feels safe in relationship  Contraceptive method: none, okay if she gets pregnant  Breast cancer screening: scheduled for " 2/22/2023; does have a lump in left axilla that was biopsied and benign    Other health-related habits:  Tobacco: None    Vaccines: UTD, will obtain records  Hep C & HIV screening: due  DM screening: due  Lipids: due  Colon cancer screening: due, opts for Cologuard      OBJECTIVE:   /81 (BP Location: Left arm, Patient Position: Sitting, Cuff Size: Adult Regular)   Pulse 66   Temp 97.6  F (36.4  C) (Skin)   Wt 63.5 kg (140 lb)   LMP 02/05/2023 (Exact Date)   SpO2 97%   BMI 25.61 kg/m     General: Healthy female in NAD.  Cooperative and pleasant.  HEENT: Normocephalic, atraumatic. Oropharynx moist without lesions or exudate.  TMs normal. Supple neck, without lymphadenopathy or thyromegaly.    Lungs: CTA bilaterally.  CV: RRR, normal S1 and S2, without murmurs, rubs, or gallops appreciated.    Abdomen: Soft, NT, ND.  No masses or hepatosplenomegaly appreciated.    Extremities: WWW, without deformity, edema.  Skin: Clear without lesions or rashes.   Neuro: Grossly intact, nonfocal.  Psych: Mood and affect appropriate.      ASSESSMENT AND PLAN:   Marielos was seen today for physical.    Diagnoses and all orders for this visit:    Routine general medical examination at a health care facility    Screening for lipoid disorders  -     Lipid Profile; Future  -     Basic metabolic panel; Future    Special screening for malignant neoplasms, colon  -     COLOGUARD([employer])    Family history of hypothyroidism  -     TSH with free T4 reflex; Future    Need for hepatitis C screening test  -     Hepatitis C Screen Reflex to HCV RNA Quant and Genotype; Future    Screening for HIV (human immunodeficiency virus)  -     HIV Antigen Antibody Combo; Future    Migraine without aura and without status migrainosus, not intractable  -     SUMAtriptan (IMITREX) 25 MG tablet; Take 2 tablets at onset of migraine. May repeat 1 tablet in 2 hours if migraine persists. Max 8 tablets/24 hours.    Skin lesion  -     Adult Dermatology  Referral; Future    Health maintenance updates: HIV, hepatitis C, lipid screening. BMP ordered. Opts for Cologuard for colon cancer screening. Family history of hypothyroidism in 2 first degree relatives, check TSH. Dermatology referral for skin cancer screening. Trial of sumatriptan for infrequent migraines. I discussed with the patient the risks, benefits, and alternatives to taking this medication as well as common side effects.     Return in about 1 year (around 2/15/2024) for physical.    Geni Whalen MD  Family Medicine

## 2023-02-12 ENCOUNTER — HEALTH MAINTENANCE LETTER (OUTPATIENT)
Age: 48
End: 2023-02-12

## 2023-02-13 ASSESSMENT — ENCOUNTER SYMPTOMS
PALPITATIONS: 0
DIZZINESS: 0
ABDOMINAL PAIN: 0
NAUSEA: 0
PARESTHESIAS: 0
HEARTBURN: 0
JOINT SWELLING: 0
BREAST MASS: 0
DYSURIA: 0
CONSTIPATION: 0
CHILLS: 0
SORE THROAT: 0
SHORTNESS OF BREATH: 0
HEMATURIA: 0
ARTHRALGIAS: 0
FREQUENCY: 0
COUGH: 0
NERVOUS/ANXIOUS: 1
FEVER: 0
MYALGIAS: 0
WEAKNESS: 0
EYE PAIN: 0
HEMATOCHEZIA: 0
DIARRHEA: 0
HEADACHES: 1

## 2023-02-14 NOTE — PROGRESS NOTES
[facility]  February 15, 2023    Behavioral Health Clinician Progress Note    Patient Name: Marielos Marti           Service Type:  Individual      Service Location:   Telehealth; see below     Session Start Time: 4:01 pm  Session End Time: 4:41 pm      Session Length: 38 - 52      Attendees: Patient     Service Modality:  Video Visit:      Provider verified identity through the following two step process.  Patient provided:  Patient , Patient address and Patient is known previously to provider    Telemedicine Visit: The patient's condition can be safely assessed and treated via synchronous audio and visual telemedicine encounter.      Reason for Telemedicine Visit: Patient has requested telehealth visit    Originating Site (Patient Location): Patient's home    Distant Site (Provider Location): [facility]    Consent:  The patient/guardian has verbally consented to: the potential risks and benefits of telemedicine (video visit) versus in person care; bill my insurance or make self-payment for services provided; and responsibility for payment of non-covered services.     Patient would like the video invitation sent by:  My Chart    Mode of Communication:  Video Conference via Amwell    Distant Location (Provider):  On-site    As the provider I attest to compliance with applicable laws and regulations related to telemedicine.    Visit Activities (Refresh list every visit): [facility] Only    Diagnostic Assessment Date: 23  Treatment Plan Review Date: 23  CGI Review Date: 23  Promis 10 Review Date:  23    Clinical Global Impressions  First:  Considering your total clinical experience with this particular patient population, how severe are the patient's symptoms at this time?: 4 (2023  5:02 PM)    Most recent:  No data recorded    See Flowsheets for today's PHQ-9 and MARY-7 results  Previous PHQ-9:   PHQ-9 SCORE 2023 2/15/2023 2/15/2023   PHQ-9 Total Score MyChart 7  "(Mild depression) - 9 (Mild depression)   PHQ-9 Total Score 7 7 9     Previous MARY-7:   MARY-7 SCORE 1/31/2023 2/15/2023 2/15/2023   Total Score 9 (mild anxiety) - 8 (mild anxiety)   Total Score 9 8 8       DERIAN LEVEL:  No flowsheet data found.    DATA  Extended Session (60+ minutes): No  Interactive Complexity: No  Crisis: No  [facility] Patient: No    Treatment Objective(s) Addressed in This Session:  Target Behavior(s): increase socialization and activity level, and be more present    Depressed Mood: Increase interest, engagement, and pleasure in doing things  Decrease frequency and intensity of feeling down, depressed, hopeless  Feel less tired and more energy during the day   Identify negative self-talk and behaviors: challenge core beliefs, myths, and actions  Anxiety: will experience a reduction in anxiety, will develop more effective coping skills to manage anxiety symptoms, will develop healthy cognitive patterns and beliefs and will increase ability to function adaptively    Current Stressors / Issues:    Marielos reported she and her partner both got Covid in the past two weeks, and stated, \"I didn't accomplish my skiing goal, but I was pretty productive\".  Marielos shared some people at her work was unexpectedly laid off which prompted her to \"Shored up my finances\" and \"made myself a budget\".  [facility] reflected how instead of her anxiety becoming overwhelming and leading to decreased action/productivity, she uses her worry as motivation to take effective action.  Marielos responded by stated, she knew \"I would feel better and manage my anxiety a little better\" if she made a budget.  After realizing how she effective she managed this stressor, she stated \"I'm proud of myself...I don't always have a productive response\".      Marielos reported lay offs at work also prompted her to consider her professional future.  Marielos acknowledged experiencing some signs of burn out and has been thinking about a career change for some " "time.  Marielos stated she will talk with partner's aunt when she returns to town in March/April to consult on this topic and receive feedback (aunt has written books on burn out and career second acts).     Finally, [facility] and Marielos returned to goal of \"Being still and being focused\", wanting to be more present in daily life.  Marielos reported cooking for 2 hours, which was really helpful.  When talking about cooking/baking, Marielos stated, \"there's no draw back\", \"it brings me back around\" and she's able to be present when cooking.        Progress on Treatment Objective(s) / Homework:  Minimal progress - ACTION (Actively working towards change); Intervened by reinforcing change plan / affirming steps taken    Motivational Interviewing    MI Intervention: Co-Developed Goal: Increase socialization and activity level; be more present, Expressed Empathy/Understanding, Supported Autonomy, Collaboration, Evocation, Open-ended questions, Reflections: simple and complex and Change talk (evoked)     Change Talk Expressed by the Patient: Desire to change Ability to change Reasons to change Need to change    Provider Response to Change Talk: E - Evoked more info from patient about behavior change, A - Affirmed patient's thoughts, decisions, or attempts at behavior change, R - Reflected patient's change talk and S - Summarized patient's change talk statements    Also provided psychoeducation about behavioral health condition, symptoms, and treatment options    Solution-Focused Therapy    Explore patterns in patient's behaviors and relationships and discuss options for new behaviors    Explore new options for problem-solving, communication, managing stress, etc.    Mindfulness-Based Strategies    Discuss skills based on development and application of mindfulness    Support development of skills drawn from compassion-focused therapy, dialectical behavior therapy, mindfulness-based stress reduction, mindfulness-based cognitive " therapy, etc.      Care Plan review completed: Yes    Medication Review:  No changes to current psychiatric medication(s)    Medication Compliance:  Yes    Changes in Health Issues:   None reported    Chemical Use Review:   Substance Use: Chemical use reviewed, no active concerns identified      Tobacco Use: No current tobacco use.      Assessment: Current Emotional / Mental Status (status of significant symptoms):  Risk status (Self / Other harm or suicidal ideation)  Patient has had a history of suicidal ideation: passive, never intent or plan; no SIB, no SA; most recent passive SI was years ago  Patient denies current fears or concerns for personal safety.  Patient denies current or recent suicidal ideation or behaviors.  Patient denies current or recent homicidal ideation or behaviors.  Patient denies current or recent self injurious behavior or ideation.  Patient denies other safety concerns.  A safety and risk management plan has not been developed at this time, however patient was encouraged to call [place] [phone] should there be a change in any of these risk factors.    Appearance:   Appropriate   Eye Contact:   Good   Psychomotor Behavior: Normal   Attitude:   Cooperative   Orientation:   All  Speech   Rate / Production: Normal    Volume:  Normal   Mood:    Anxious   Affect:    Worrisome   Thought Content:  Clear   Thought Form:  Coherent  Logical   Insight:    Good     Diagnoses:  1. MARY (generalized anxiety disorder)        Collateral Reports Completed:  Routed note to PCP    Plan: (Homework, other):  Patient was given information about behavioral services and encouraged to schedule a follow up appointment with the clinic [facility] in 3 weeks.  She was also given information about mental health symptoms and treatment options .  CD Recommendations: No indications of CD issues.  Shawn Ullrich [facility], [facility]      ______________________________________________________________________    Integrated Primary Care  "Behavioral Health Treatment Plan    Patient's Name: Marielos Marti  YOB: 1975    Date of Creation: 2/1/23  Date Treatment Plan Last Reviewed/Revised: 2/1/23    DSM5 Diagnoses: 296.32 (F33.1) Major Depressive Disorder, Recurrent Episode, Moderate _ or 300.02 (F41.1) Generalized Anxiety Disorder  Psychosocial / Contextual Factors: , Partnered, Female, heterosexual; full-time employment; Moved to [place] over 1 year ago  PROMIS (reviewed every 90 days): pt completed on 1/25/23    Referral / Collaboration:  Referral to another professional/service is not indicated at this time..    Anticipated number of session for this episode of care: 8  Anticipation frequency of session: Every other week  Anticipated Duration of each session: 38-52 minutes  Treatment plan will be reviewed in 90 days or when goals have been changed.       MeasurableTreatment Goal(s) related to diagnosis / functional impairment(s)  Goal 1: Patient will decrease anxiety and improve mood by developing community/supports in the area.     I will know I've met my goal when :  \"finding my community in the [facility]\".    Goal 1: Build Community-skiing  -Got brand new [employer] skis for [employer] and has not used skis yet  -\"I want to ski before the end of season\"  -she could then find a group of people to ski with  1. Buy a ski pass   2. Set time and explore the trail before skiing with others   -will do with partner this weekend  3. Find a \"Beginner group\" to ski with  Change Talk: \"This is a good idea\"      Objective #A (Patient Action)    Patient will attend and participate in social or recreational activities to build community.  Status: New - Date: 2/1/23     Intervention(s)  Therapist will assign homework to brainstorm options to develop community and take one action that gets her closer to her goal weekly.       Goal 2: Patient will reduce anxiety by being more present in daily life; evidenced by learning 3 activities/strategies that " "promote being present     I will know I've met my goal when : \"Being still and being focused\"  -more present in daily life   -Baking forces her to be present    -will bake something this weekend  -other signs she's less anxious    -\"shoulders de-slump\"    -less shaky   -calm    -\"I'm sitting still\"   -Less active on my devices    Objective #A (Patient Action)    Status: New - Date: 2/1/23     Patient will practice one mindfulness activity daily.    Intervention(s)  Therapist will teach mindfulness activities and strategies.          Patient has reviewed and agreed to the above plan.      Shawn G. Ullrich, [facility]  February 1, 2023  "

## 2023-02-15 ENCOUNTER — VIRTUAL VISIT (OUTPATIENT)
Dept: BEHAVIORAL HEALTH | Facility: CLINIC | Age: 48
End: 2023-02-15
Payer: COMMERCIAL

## 2023-02-15 ENCOUNTER — OFFICE VISIT (OUTPATIENT)
Dept: FAMILY MEDICINE | Facility: CLINIC | Age: 48
End: 2023-02-15
Payer: COMMERCIAL

## 2023-02-15 VITALS
WEIGHT: 140 LBS | OXYGEN SATURATION: 97 % | SYSTOLIC BLOOD PRESSURE: 118 MMHG | HEART RATE: 66 BPM | TEMPERATURE: 97.6 F | BODY MASS INDEX: 25.61 KG/M2 | DIASTOLIC BLOOD PRESSURE: 81 MMHG

## 2023-02-15 DIAGNOSIS — F41.1 GAD (GENERALIZED ANXIETY DISORDER): Primary | ICD-10-CM

## 2023-02-15 DIAGNOSIS — Z00.00 ROUTINE GENERAL MEDICAL EXAMINATION AT A HEALTH CARE FACILITY: Primary | ICD-10-CM

## 2023-02-15 DIAGNOSIS — Z11.4 SCREENING FOR HIV (HUMAN IMMUNODEFICIENCY VIRUS): ICD-10-CM

## 2023-02-15 DIAGNOSIS — L98.9 SKIN LESION: ICD-10-CM

## 2023-02-15 DIAGNOSIS — G43.009 MIGRAINE WITHOUT AURA AND WITHOUT STATUS MIGRAINOSUS, NOT INTRACTABLE: ICD-10-CM

## 2023-02-15 DIAGNOSIS — Z83.49 FAMILY HISTORY OF HYPOTHYROIDISM: ICD-10-CM

## 2023-02-15 DIAGNOSIS — Z12.11 SPECIAL SCREENING FOR MALIGNANT NEOPLASMS, COLON: ICD-10-CM

## 2023-02-15 DIAGNOSIS — Z13.220 SCREENING FOR LIPOID DISORDERS: ICD-10-CM

## 2023-02-15 DIAGNOSIS — Z11.59 NEED FOR HEPATITIS C SCREENING TEST: ICD-10-CM

## 2023-02-15 LAB
ANION GAP SERPL CALCULATED.3IONS-SCNC: 11 MMOL/L (ref 7–15)
BUN SERPL-MCNC: 12 MG/DL (ref 6–20)
CALCIUM SERPL-MCNC: 8.9 MG/DL (ref 8.6–10)
CHLORIDE SERPL-SCNC: 107 MMOL/L (ref 98–107)
CHOLEST SERPL-MCNC: 225 MG/DL
CREAT SERPL-MCNC: 0.79 MG/DL (ref 0.51–0.95)
DEPRECATED HCO3 PLAS-SCNC: 25 MMOL/L (ref 22–29)
GFR SERPL CREATININE-BSD FRML MDRD: >90 ML/MIN/1.73M2
GLUCOSE SERPL-MCNC: 81 MG/DL (ref 70–99)
HDLC SERPL-MCNC: 62 MG/DL
LDLC SERPL CALC-MCNC: 131 MG/DL
NONHDLC SERPL-MCNC: 163 MG/DL
POTASSIUM SERPL-SCNC: 4.1 MMOL/L (ref 3.4–5.3)
SODIUM SERPL-SCNC: 143 MMOL/L (ref 136–145)
TRIGL SERPL-MCNC: 158 MG/DL
TSH SERPL DL<=0.005 MIU/L-ACNC: 3.15 UIU/ML (ref 0.3–4.2)

## 2023-02-15 PROCEDURE — 86803 HEPATITIS C AB TEST: CPT | Performed by: FAMILY MEDICINE

## 2023-02-15 PROCEDURE — 80048 BASIC METABOLIC PNL TOTAL CA: CPT | Performed by: FAMILY MEDICINE

## 2023-02-15 PROCEDURE — 84443 ASSAY THYROID STIM HORMONE: CPT | Performed by: FAMILY MEDICINE

## 2023-02-15 PROCEDURE — 87389 HIV-1 AG W/HIV-1&-2 AB AG IA: CPT | Performed by: FAMILY MEDICINE

## 2023-02-15 PROCEDURE — 80061 LIPID PANEL: CPT | Performed by: FAMILY MEDICINE

## 2023-02-15 RX ORDER — SUMATRIPTAN 25 MG/1
TABLET, FILM COATED ORAL
Qty: 9 TABLET | Refills: 1 | Status: SHIPPED | OUTPATIENT
Start: 2023-02-15

## 2023-02-15 ASSESSMENT — ANXIETY QUESTIONNAIRES
GAD7 TOTAL SCORE: 8
3. WORRYING TOO MUCH ABOUT DIFFERENT THINGS: MORE THAN HALF THE DAYS
IF YOU CHECKED OFF ANY PROBLEMS ON THIS QUESTIONNAIRE, HOW DIFFICULT HAVE THESE PROBLEMS MADE IT FOR YOU TO DO YOUR WORK, TAKE CARE OF THINGS AT HOME, OR GET ALONG WITH OTHER PEOPLE: SOMEWHAT DIFFICULT
6. BECOMING EASILY ANNOYED OR IRRITABLE: NOT AT ALL
8. IF YOU CHECKED OFF ANY PROBLEMS, HOW DIFFICULT HAVE THESE MADE IT FOR YOU TO DO YOUR WORK, TAKE CARE OF THINGS AT HOME, OR GET ALONG WITH OTHER PEOPLE?: SOMEWHAT DIFFICULT
5. BEING SO RESTLESS THAT IT IS HARD TO SIT STILL: NOT AT ALL
7. FEELING AFRAID AS IF SOMETHING AWFUL MIGHT HAPPEN: SEVERAL DAYS
7. FEELING AFRAID AS IF SOMETHING AWFUL MIGHT HAPPEN: SEVERAL DAYS
6. BECOMING EASILY ANNOYED OR IRRITABLE: NOT AT ALL
4. TROUBLE RELAXING: SEVERAL DAYS
GAD7 TOTAL SCORE: 8
GAD7 TOTAL SCORE: 8
1. FEELING NERVOUS, ANXIOUS, OR ON EDGE: MORE THAN HALF THE DAYS
5. BEING SO RESTLESS THAT IT IS HARD TO SIT STILL: NOT AT ALL
3. WORRYING TOO MUCH ABOUT DIFFERENT THINGS: MORE THAN HALF THE DAYS
7. FEELING AFRAID AS IF SOMETHING AWFUL MIGHT HAPPEN: SEVERAL DAYS
GAD7 TOTAL SCORE: 8
2. NOT BEING ABLE TO STOP OR CONTROL WORRYING: MORE THAN HALF THE DAYS
GAD7 TOTAL SCORE: 8
IF YOU CHECKED OFF ANY PROBLEMS ON THIS QUESTIONNAIRE, HOW DIFFICULT HAVE THESE PROBLEMS MADE IT FOR YOU TO DO YOUR WORK, TAKE CARE OF THINGS AT HOME, OR GET ALONG WITH OTHER PEOPLE: SOMEWHAT DIFFICULT
2. NOT BEING ABLE TO STOP OR CONTROL WORRYING: MORE THAN HALF THE DAYS
1. FEELING NERVOUS, ANXIOUS, OR ON EDGE: SEVERAL DAYS

## 2023-02-15 ASSESSMENT — PATIENT HEALTH QUESTIONNAIRE - PHQ9
10. IF YOU CHECKED OFF ANY PROBLEMS, HOW DIFFICULT HAVE THESE PROBLEMS MADE IT FOR YOU TO DO YOUR WORK, TAKE CARE OF THINGS AT HOME, OR GET ALONG WITH OTHER PEOPLE: SOMEWHAT DIFFICULT
SUM OF ALL RESPONSES TO PHQ QUESTIONS 1-9: 7
SUM OF ALL RESPONSES TO PHQ QUESTIONS 1-9: 9
SUM OF ALL RESPONSES TO PHQ QUESTIONS 1-9: 9
5. POOR APPETITE OR OVEREATING: MORE THAN HALF THE DAYS

## 2023-02-15 NOTE — NURSING NOTE
47 year old  Chief Complaint   Patient presents with     Physical     Lump under arm, discuss. Derm questions. Allergy management.        Blood pressure 118/81, pulse 66, temperature 97.6  F (36.4  C), temperature source Skin, weight 63.5 kg (140 lb), last menstrual period 02/05/2023, SpO2 97 %. Body mass index is 25.61 kg/m .  Patient Active Problem List   Diagnosis     Seasonal allergic rhinitis     Depressive disorder     Herpes simplex     Generalized anxiety disorder       Wt Readings from Last 2 Encounters:   02/15/23 63.5 kg (140 lb)   01/19/23 63.1 kg (139 lb 1.3 oz)     BP Readings from Last 3 Encounters:   02/15/23 118/81   01/19/23 122/77         Current Outpatient Medications   Medication     sertraline (ZOLOFT) 100 MG tablet     valACYclovir (VALTREX) 500 MG tablet     No current facility-administered medications for this visit.       Social History     Tobacco Use     Smoking status: Never     Smokeless tobacco: Never   Vaping Use     Vaping Use: Never used   Substance Use Topics     Alcohol use: Yes     Comment: Moderate; glass of wine with dinner sometimes. 2-3 in a sitting.     Drug use: Yes     Types: Marijuana     Comment: Edible       Health Maintenance Due   Topic Date Due     YEARLY PREVENTIVE VISIT  Never done     ADVANCE CARE PLANNING  Never done     DEPRESSION ACTION PLAN  Never done     MAMMO SCREENING  Never done     HEPATITIS B IMMUNIZATION (1 of 3 - 3-dose series) Never done     COLORECTAL CANCER SCREENING  Never done     HIV SCREENING  Never done     HEPATITIS C SCREENING  Never done     PAP  Never done     COVID-19 Vaccine (2 - Pfizer series) 10/04/2022     LIPID  Never done       No results found for: PAP      February 15, 2023 8:20 AM

## 2023-02-16 LAB
HCV AB SERPL QL IA: NONREACTIVE
HIV 1+2 AB+HIV1 P24 AG SERPL QL IA: NONREACTIVE

## 2023-03-07 ENCOUNTER — VIRTUAL VISIT (OUTPATIENT)
Dept: BEHAVIORAL HEALTH | Facility: CLINIC | Age: 48
End: 2023-03-07
Payer: COMMERCIAL

## 2023-03-07 DIAGNOSIS — F41.1 GENERALIZED ANXIETY DISORDER: Primary | ICD-10-CM

## 2023-03-07 ASSESSMENT — ANXIETY QUESTIONNAIRES
3. WORRYING TOO MUCH ABOUT DIFFERENT THINGS: MORE THAN HALF THE DAYS
GAD7 TOTAL SCORE: 10
2. NOT BEING ABLE TO STOP OR CONTROL WORRYING: MORE THAN HALF THE DAYS
7. FEELING AFRAID AS IF SOMETHING AWFUL MIGHT HAPPEN: MORE THAN HALF THE DAYS
5. BEING SO RESTLESS THAT IT IS HARD TO SIT STILL: NOT AT ALL
4. TROUBLE RELAXING: MORE THAN HALF THE DAYS
1. FEELING NERVOUS, ANXIOUS, OR ON EDGE: MORE THAN HALF THE DAYS
7. FEELING AFRAID AS IF SOMETHING AWFUL MIGHT HAPPEN: MORE THAN HALF THE DAYS
IF YOU CHECKED OFF ANY PROBLEMS ON THIS QUESTIONNAIRE, HOW DIFFICULT HAVE THESE PROBLEMS MADE IT FOR YOU TO DO YOUR WORK, TAKE CARE OF THINGS AT HOME, OR GET ALONG WITH OTHER PEOPLE: SOMEWHAT DIFFICULT
GAD7 TOTAL SCORE: 10
6. BECOMING EASILY ANNOYED OR IRRITABLE: NOT AT ALL
8. IF YOU CHECKED OFF ANY PROBLEMS, HOW DIFFICULT HAVE THESE MADE IT FOR YOU TO DO YOUR WORK, TAKE CARE OF THINGS AT HOME, OR GET ALONG WITH OTHER PEOPLE?: SOMEWHAT DIFFICULT
GAD7 TOTAL SCORE: 10

## 2023-03-07 ASSESSMENT — PATIENT HEALTH QUESTIONNAIRE - PHQ9
SUM OF ALL RESPONSES TO PHQ QUESTIONS 1-9: 6
SUM OF ALL RESPONSES TO PHQ QUESTIONS 1-9: 6
10. IF YOU CHECKED OFF ANY PROBLEMS, HOW DIFFICULT HAVE THESE PROBLEMS MADE IT FOR YOU TO DO YOUR WORK, TAKE CARE OF THINGS AT HOME, OR GET ALONG WITH OTHER PEOPLE: SOMEWHAT DIFFICULT

## 2023-03-07 NOTE — PROGRESS NOTES
[facility]  2023    Behavioral Health Clinician Progress Note    Patient Name: Marielos Marti           Service Type:  Individual      Service Location:   Telehealth; see below     Session Start Time: 1:03 pm  Session End Time: 1:44 pm      Session Length: 38 - 52      Attendees: Patient     Service Modality:  Video Visit:      Provider verified identity through the following two step process.  Patient provided:  Patient , Patient address and Patient is known previously to provider    Telemedicine Visit: The patient's condition can be safely assessed and treated via synchronous audio and visual telemedicine encounter.      Reason for Telemedicine Visit: Patient has requested telehealth visit    Originating Site (Patient Location): Patient's home    Distant Site (Provider Location): [facility]    Consent:  The patient/guardian has verbally consented to: the potential risks and benefits of telemedicine (video visit) versus in person care; bill my insurance or make self-payment for services provided; and responsibility for payment of non-covered services.     Patient would like the video invitation sent by:  My Chart    Mode of Communication:  Video Conference via Amwell    Distant Location (Provider):  On-site    As the provider I attest to compliance with applicable laws and regulations related to telemedicine.    Visit Activities (Refresh list every visit): [facility] Only    Diagnostic Assessment Date: 23  Treatment Plan Review Date: 23  CGI Review Date: 23  Promis 10 Review Date:  23    Clinical Global Impressions  First:  Considering your total clinical experience with this particular patient population, how severe are the patient's symptoms at this time?: 4 (2023  5:02 PM)    Most recent:  No data recorded    See Flowsheets for today's PHQ-9 and MARY-7 results  Previous PHQ-9:   PHQ-9 SCORE 2/15/2023 2/15/2023 3/7/2023   PHQ-9 Total Score MyChart - 9 (Mild  "depression) 6 (Mild depression)   PHQ-9 Total Score 7 9 6     Previous MARY-7:   MARY-7 SCORE 2/15/2023 2/15/2023 3/7/2023   Total Score - 8 (mild anxiety) 10 (moderate anxiety)   Total Score 8 8 10       DERIAN LEVEL:  No flowsheet data found.    DATA  Extended Session (60+ minutes): No  Interactive Complexity: No  Crisis: No  [facility] Patient: No    Treatment Objective(s) Addressed in This Session:  Target Behavior(s): increase socialization and activity level, and be more present    Depressed Mood: Increase interest, engagement, and pleasure in doing things  Decrease frequency and intensity of feeling down, depressed, hopeless  Feel less tired and more energy during the day   Identify negative self-talk and behaviors: challenge core beliefs, myths, and actions  Anxiety: will experience a reduction in anxiety, will develop more effective coping skills to manage anxiety symptoms, will develop healthy cognitive patterns and beliefs and will increase ability to function adaptively    Current Stressors / Issues:    Teja was initially positive when reporting she has been spending time with 's co-worker, who is fostering a dog and teja wants to adopt the dog (partner will most likely say no to having a dog), but then her mood worsens b/c adopting a dog has \"brought up the commitment stuff\", which at times has been difficult for Teja in regard to her relationship with long-term partner.  [facility] provided support and reflected current emotions, Teja responded by sharing history of partner making decisions for the relationship and avoiding commitments, which at times has made Teja's needs/wants secondary.   Teja shared her and partner have been together for many years, but due to his lack of commitment they are not  and do not even have a shared bank account.  Teja reported she has made commitments, taken risks, and made compromises, including moving across country with him to a place she had never lived, " "even when he didn't have a job (and she did, ie she's financially stable).   Marielos reported the move has been difficult on her, especially the lack of support in the area.  Marielos stated, \"I need to feel more supported\", not only by her partner, but by also developing her own support network.  Marielos reported she grew up with dogs, connects with animals and feels good about caring for animals.  Marielos also noted having a dog is \"a more active way\" of helping herself, including forcing her to be more active (ie daily walks), socialize (e.g. taking dog to dog park, meeting other people with dogs), and make her keep a daily schedule.  [facility] reflected how Marielos feels about having a dog and directed her toward how will she communicate this to partner.  [facility] and Marielos then focused and explored use of interpersonal strategies, including communication skills to effective convey her needs and the importance of this issue, without pushing partner away, instead framing conversation as a way for him to support her.      Marielos expressed confidence about talking to her partner about this issue, stating she is 100% confident she will have a conversation with partner (she is 30% confident they will get dog).      Progress on Treatment Objective(s) / Homework:  Worsening - CONTEMPLATION (Considering change and yet undecided); Intervened by assessing the negative and positive thinking (ambivalence) about behavior change    Motivational Interviewing    MI Intervention: Co-Developed Goal: Increase socialization and activity level; be more present, Expressed Empathy/Understanding, Supported Autonomy, Collaboration, Evocation, Open-ended questions, Reflections: simple and complex and Change talk (evoked)     Change Talk Expressed by the Patient: Desire to change Ability to change Reasons to change Need to change    Provider Response to Change Talk: E - Evoked more info from patient about behavior change, A - Affirmed patient's thoughts, " decisions, or attempts at behavior change, R - Reflected patient's change talk and S - Summarized patient's change talk statements    Skills training    Explore specific skills useful to client in current situation    Skill areas include assertiveness, communication, conflict management, problem-solving, relaxation, etc.     Interpersonal Psychotherapy    Explore patterns in relationships that are effective or ineffective at helping patient reach their goals, find satisfying experience.    Discuss new patterns or behaviors to engage in for improved social functioning.      Care Plan review completed: Yes    Medication Review:  No changes to current psychiatric medication(s)    Medication Compliance:  Yes    Changes in Health Issues:   None reported    Chemical Use Review:   Substance Use: Chemical use reviewed, no active concerns identified      Tobacco Use: No current tobacco use.      Assessment: Current Emotional / Mental Status (status of significant symptoms):  Risk status (Self / Other harm or suicidal ideation)  Patient has had a history of suicidal ideation: passive, never intent or plan; no SIB, no SA; most recent passive SI was years ago  Patient denies current fears or concerns for personal safety.  Patient denies current or recent suicidal ideation or behaviors.  Patient denies current or recent homicidal ideation or behaviors.  Patient denies current or recent self injurious behavior or ideation.  Patient denies other safety concerns.  A safety and risk management plan has not been developed at this time, however patient was encouraged to call [place] [phone] should there be a change in any of these risk factors.    Appearance:   Appropriate   Eye Contact:   Good   Psychomotor Behavior: Normal   Attitude:   Cooperative   Orientation:   All  Speech   Rate / Production: Normal    Volume:  Normal   Mood:    Anxious sad, resentful  Affect:    Worrisome   Thought Content:  Clear   Thought Form:  Coherent  " Logical   Insight:    Good     Diagnoses:  1. Generalized anxiety disorder        Collateral Reports Completed:  Routed note to PCP    Plan: (Homework, other):  Patient was given information about behavioral services and encouraged to schedule a follow up appointment with the clinic [facility] in 2 weeks.  She was also given information about mental health symptoms and treatment options .  CD Recommendations: No indications of CD issues.  Shamir Frankslrich [facility], [facility]      ______________________________________________________________________    Integrated Primary Care Behavioral Health Treatment Plan    Patient's Name: Marielos Marti  YOB: 1975    Date of Creation: 2/1/23  Date Treatment Plan Last Reviewed/Revised: 2/1/23    DSM5 Diagnoses: 296.32 (F33.1) Major Depressive Disorder, Recurrent Episode, Moderate _ or 300.02 (F41.1) Generalized Anxiety Disorder  Psychosocial / Contextual Factors: , Partnered, Female, heterosexual; full-time employment; Moved to [place] over 1 year ago  PROMIS (reviewed every 90 days): pt completed on 1/25/23    Referral / Collaboration:  Referral to another professional/service is not indicated at this time..    Anticipated number of session for this episode of care: 8  Anticipation frequency of session: Every other week  Anticipated Duration of each session: 38-52 minutes  Treatment plan will be reviewed in 90 days or when goals have been changed.       MeasurableTreatment Goal(s) related to diagnosis / functional impairment(s)  Goal 1: Patient will decrease anxiety and improve mood by developing community/supports in the area.     I will know I've met my goal when :  \"finding my community in the [facility]\".    Goal 1: Build Community-skiing  -Got brand new [employer] skis for [employer] and has not used skis yet  -\"I want to ski before the end of season\"  -she could then find a group of people to ski with  1. Buy a ski pass   2. Set time and explore the trail before skiing with " "others   -will do with partner this weekend  3. Find a \"Beginner group\" to ski with  Change Talk: \"This is a good idea\"      Objective #A (Patient Action)    Patient will attend and participate in social or recreational activities to build community.  Status: New - Date: 2/1/23     Intervention(s)  Therapist will assign homework to brainstorm options to develop community and take one action that gets her closer to her goal weekly.       Goal 2: Patient will reduce anxiety by being more present in daily life; evidenced by learning 3 activities/strategies that promote being present     I will know I've met my goal when : \"Being still and being focused\"  -more present in daily life   -Baking forces her to be present    -will bake something this weekend  -other signs she's less anxious    -\"shoulders de-slump\"    -less shaky   -calm    -\"I'm sitting still\"   -Less active on my devices    Objective #A (Patient Action)    Status: New - Date: 2/1/23     Patient will practice one mindfulness activity daily.    Intervention(s)  Therapist will teach mindfulness activities and strategies.          Patient has reviewed and agreed to the above plan.      Shawn G. Ullrich, [facility]  February 1, 2023  "

## 2023-03-13 ENCOUNTER — ANCILLARY PROCEDURE (OUTPATIENT)
Dept: MAMMOGRAPHY | Facility: CLINIC | Age: 48
End: 2023-03-13
Attending: FAMILY MEDICINE
Payer: COMMERCIAL

## 2023-03-13 DIAGNOSIS — Z12.31 ENCOUNTER FOR SCREENING MAMMOGRAM FOR BREAST CANCER: ICD-10-CM

## 2023-03-13 PROCEDURE — 77063 BREAST TOMOSYNTHESIS BI: CPT | Performed by: STUDENT IN AN ORGANIZED HEALTH CARE EDUCATION/TRAINING PROGRAM

## 2023-03-13 PROCEDURE — 77067 SCR MAMMO BI INCL CAD: CPT | Performed by: STUDENT IN AN ORGANIZED HEALTH CARE EDUCATION/TRAINING PROGRAM

## 2023-03-15 LAB — NONINV COLON CA DNA+OCC BLD SCRN STL QL: NEGATIVE

## 2023-03-21 ENCOUNTER — ANCILLARY PROCEDURE (OUTPATIENT)
Dept: MAMMOGRAPHY | Facility: CLINIC | Age: 48
End: 2023-03-21
Attending: FAMILY MEDICINE
Payer: COMMERCIAL

## 2023-03-21 DIAGNOSIS — R92.8 ABNORMAL MAMMOGRAM OF LEFT BREAST: ICD-10-CM

## 2023-03-21 PROCEDURE — 76642 ULTRASOUND BREAST LIMITED: CPT | Mod: LT | Performed by: STUDENT IN AN ORGANIZED HEALTH CARE EDUCATION/TRAINING PROGRAM

## 2023-03-22 NOTE — PROGRESS NOTES
[facility]  2023    Behavioral Health Clinician Progress Note    Patient Name: Marielos Marti           Service Type:  Individual      Service Location:   Telehealth; see below     Session Start Time: 4:00 pm  Session End Time: 4:35 pm      Session Length: 16 - 37      Attendees: Patient     Service Modality:  Video Visit:      Provider verified identity through the following two step process.  Patient provided:  Patient , Patient address and Patient is known previously to provider    Telemedicine Visit: The patient's condition can be safely assessed and treated via synchronous audio and visual telemedicine encounter.      Reason for Telemedicine Visit: Patient has requested telehealth visit    Originating Site (Patient Location): Patient's home    Distant Site (Provider Location): [facility]    Consent:  The patient/guardian has verbally consented to: the potential risks and benefits of telemedicine (video visit) versus in person care; bill my insurance or make self-payment for services provided; and responsibility for payment of non-covered services.     Patient would like the video invitation sent by:  My Chart    Mode of Communication:  Video Conference via Amwell    Distant Location (Provider):  On-site    As the provider I attest to compliance with applicable laws and regulations related to telemedicine.    Visit Activities (Refresh list every visit): [facility] Only    Diagnostic Assessment Date: 23  Treatment Plan Review Date: 23  CGI Review Date: 23  Promis 10 Review Date:  23    Clinical Global Impressions  First:  Considering your total clinical experience with this particular patient population, how severe are the patient's symptoms at this time?: 4 (2023  5:02 PM)    Most recent:  No data recorded    See Flowsheets for today's PHQ-9 and MARY-7 results  Previous PHQ-9:   PHQ-9 SCORE 2/15/2023 3/7/2023 3/23/2023   PHQ-9 Total Score MyChart 9 (Mild  "depression) 6 (Mild depression) 8 (Mild depression)   PHQ-9 Total Score 9 6 8     Previous MARY-7:   MARY-7 SCORE 2/15/2023 3/7/2023 3/23/2023   Total Score 8 (mild anxiety) 10 (moderate anxiety) 9 (mild anxiety)   Total Score 8 10 9       DERIAN LEVEL:  No flowsheet data found.    DATA  Extended Session (60+ minutes): No  Interactive Complexity: No  Crisis: No  [facility] Patient: No    Treatment Objective(s) Addressed in This Session:  Target Behavior(s): increase socialization and activity level, and be more present    Depressed Mood: Increase interest, engagement, and pleasure in doing things  Decrease frequency and intensity of feeling down, depressed, hopeless  Feel less tired and more energy during the day   Identify negative self-talk and behaviors: challenge core beliefs, myths, and actions  Anxiety: will experience a reduction in anxiety, will develop more effective coping skills to manage anxiety symptoms, will develop healthy cognitive patterns and beliefs and will increase ability to function adaptively    Current Stressors / Issues:    Marielos reported they did not adopt a dog, but she had discussion with partner about commitment.  Marielos reported she was able to get partner to agree to a joint checking account, which was a success.  Marielos reported the topic of commitment will need to be an ongoing conversation, so [facility] and Marielos explored ways to approach the topic and communication strategies to more effectively address the issue.  Marielos reported she knows partner resists demands, so she believes presenting choices/options is much more palatable to partner.  Marielos reported she would like to approach these topics by asking him, how do they \"be in it together\"?  Marielos reported she also wants to make sure there is room for compromise as well.      Marielos was also clear there are some things she won't compromise on, specifically, \"making this a home\".  Part of making it a comfortable home for Marielos is having a " relaxing space, outside of her work office.  Marielos reported the room she spends time in during the evening is cluttered and the room which has the TV also has clutter.  Marielos reported she will talk with partner about creating a relaxing space.        Progress on Treatment Objective(s) / Homework:  Minimal progress - ACTION (Actively working towards change); Intervened by reinforcing change plan / affirming steps taken    Motivational Interviewing    MI Intervention: Co-Developed Goal: Increase socialization and activity level; be more present, Expressed Empathy/Understanding, Supported Autonomy, Collaboration, Evocation, Open-ended questions, Reflections: simple and complex and Change talk (evoked)     Change Talk Expressed by the Patient: Desire to change Ability to change Reasons to change Need to change    Provider Response to Change Talk: E - Evoked more info from patient about behavior change, A - Affirmed patient's thoughts, decisions, or attempts at behavior change, R - Reflected patient's change talk and S - Summarized patient's change talk statements    Skills training    Explore specific skills useful to client in current situation    Skill areas include assertiveness, communication, conflict management, problem-solving, relaxation, etc.     Interpersonal Psychotherapy    Explore patterns in relationships that are effective or ineffective at helping patient reach their goals, find satisfying experience.    Discuss new patterns or behaviors to engage in for improved social functioning.      Care Plan review completed: Yes    Medication Review:  No changes to current psychiatric medication(s)    Medication Compliance:  Yes    Changes in Health Issues:   None reported    Chemical Use Review:   Substance Use: Chemical use reviewed, no active concerns identified      Tobacco Use: No current tobacco use.      Assessment: Current Emotional / Mental Status (status of significant symptoms):  Risk status (Self /  Other harm or suicidal ideation)  Patient has had a history of suicidal ideation: passive, never intent or plan; no SIB, no SA; most recent passive SI was years ago  Patient denies current fears or concerns for personal safety.  Patient denies current or recent suicidal ideation or behaviors.  Patient denies current or recent homicidal ideation or behaviors.  Patient denies current or recent self injurious behavior or ideation.  Patient denies other safety concerns.  A safety and risk management plan has not been developed at this time, however patient was encouraged to call [place] [phone] should there be a change in any of these risk factors.    Appearance:   Appropriate   Eye Contact:   Good   Psychomotor Behavior: Normal   Attitude:   Cooperative   Orientation:   All  Speech   Rate / Production: Normal    Volume:  Normal   Mood:    Anxious    Affect:    Worrisome   Thought Content:  Clear   Thought Form:  Coherent  Logical   Insight:    Good     Diagnoses:  1. MARY (generalized anxiety disorder)         Collateral Reports Completed:  Routed note to PCP    Plan: (Homework, other):  Patient was given information about behavioral services and encouraged to schedule a follow up appointment with the clinic [facility] in 2 weeks.  She was also given information about mental health symptoms and treatment options .  CD Recommendations: No indications of CD issues.  Shawn Ullrich [facility]      ______________________________________________________________________    Integrated Primary Care Behavioral Health Treatment Plan    Patient's Name: Marielos Marti  YOB: 1975    Date of Creation: 2/1/23  Date Treatment Plan Last Reviewed/Revised: 2/1/23    DSM5 Diagnoses: 296.32 (F33.1) Major Depressive Disorder, Recurrent Episode, Moderate _ or 300.02 (F41.1) Generalized Anxiety Disorder  Psychosocial / Contextual Factors: , Partnered, Female, heterosexual; full-time employment; Moved to [place] over 1 year  "ago  PROMIS (reviewed every 90 days): pt completed on 1/25/23    Referral / Collaboration:  Referral to another professional/service is not indicated at this time..    Anticipated number of session for this episode of care: 8  Anticipation frequency of session: Every other week  Anticipated Duration of each session: 38-52 minutes  Treatment plan will be reviewed in 90 days or when goals have been changed.       MeasurableTreatment Goal(s) related to diagnosis / functional impairment(s)  Goal 1: Patient will decrease anxiety and improve mood by developing community/supports in the area.     I will know I've met my goal when :  \"finding my community in the [place]\".    Goal 1: Build Community-skiing  -Got brand new xc skis for xmas and has not used skis yet  -\"I want to ski before the end of season\"  -she could then find a group of people to ski with  1. Buy a ski pass   2. Set time and explore the trail before skiing with others   -will do with partner this weekend  3. Find a \"Beginner group\" to ski with  Change Talk: \"This is a good idea\"      Objective #A (Patient Action)    Patient will attend and participate in social or recreational activities to build community.  Status: New - Date: 2/1/23     Intervention(s)  Therapist will assign homework to brainstorm options to develop community and take one action that gets her closer to her goal weekly.       Goal 2: Patient will reduce anxiety by being more present in daily life; evidenced by learning 3 activities/strategies that promote being present     I will know I've met my goal when : \"Being still and being focused\"  -more present in daily life   -Baking forces her to be present    -will bake something this weekend  -other signs she's less anxious    -\"shoulders de-slump\"    -less shaky   -calm    -\"I'm sitting still\"   -Less active on my devices    Objective #A (Patient Action)    Status: New - Date: 2/1/23     Patient will practice one mindfulness activity " daily.    Intervention(s)  Therapist will teach mindfulness activities and strategies.          Patient has reviewed and agreed to the above plan.      Shawn G. Ullrich, [facility]  February 1, 2023

## 2023-03-23 ENCOUNTER — VIRTUAL VISIT (OUTPATIENT)
Dept: BEHAVIORAL HEALTH | Facility: CLINIC | Age: 48
End: 2023-03-23
Payer: COMMERCIAL

## 2023-03-23 DIAGNOSIS — F41.1 GAD (GENERALIZED ANXIETY DISORDER): Primary | ICD-10-CM

## 2023-03-23 ASSESSMENT — ANXIETY QUESTIONNAIRES
GAD7 TOTAL SCORE: 9
7. FEELING AFRAID AS IF SOMETHING AWFUL MIGHT HAPPEN: SEVERAL DAYS
GAD7 TOTAL SCORE: 9
3. WORRYING TOO MUCH ABOUT DIFFERENT THINGS: MORE THAN HALF THE DAYS
2. NOT BEING ABLE TO STOP OR CONTROL WORRYING: MORE THAN HALF THE DAYS
GAD7 TOTAL SCORE: 9
IF YOU CHECKED OFF ANY PROBLEMS ON THIS QUESTIONNAIRE, HOW DIFFICULT HAVE THESE PROBLEMS MADE IT FOR YOU TO DO YOUR WORK, TAKE CARE OF THINGS AT HOME, OR GET ALONG WITH OTHER PEOPLE: SOMEWHAT DIFFICULT
6. BECOMING EASILY ANNOYED OR IRRITABLE: NOT AT ALL
7. FEELING AFRAID AS IF SOMETHING AWFUL MIGHT HAPPEN: SEVERAL DAYS
1. FEELING NERVOUS, ANXIOUS, OR ON EDGE: MORE THAN HALF THE DAYS
8. IF YOU CHECKED OFF ANY PROBLEMS, HOW DIFFICULT HAVE THESE MADE IT FOR YOU TO DO YOUR WORK, TAKE CARE OF THINGS AT HOME, OR GET ALONG WITH OTHER PEOPLE?: SOMEWHAT DIFFICULT
5. BEING SO RESTLESS THAT IT IS HARD TO SIT STILL: NOT AT ALL
4. TROUBLE RELAXING: MORE THAN HALF THE DAYS

## 2023-03-23 ASSESSMENT — PATIENT HEALTH QUESTIONNAIRE - PHQ9
SUM OF ALL RESPONSES TO PHQ QUESTIONS 1-9: 8
10. IF YOU CHECKED OFF ANY PROBLEMS, HOW DIFFICULT HAVE THESE PROBLEMS MADE IT FOR YOU TO DO YOUR WORK, TAKE CARE OF THINGS AT HOME, OR GET ALONG WITH OTHER PEOPLE: SOMEWHAT DIFFICULT
SUM OF ALL RESPONSES TO PHQ QUESTIONS 1-9: 8

## 2023-03-31 NOTE — PROGRESS NOTES
[facility]  2023    Behavioral Health Clinician Progress Note    Patient Name: Marielos Marti           Service Type:  Individual      Service Location:   Telehealth; see below     Session Start Time: 4:02 pm  Session End Time: 4:58 pm      Session Length: 53 - 60      Attendees: Patient     Service Modality:  Video Visit:      Provider verified identity through the following two step process.  Patient provided:  Patient , Patient address and Patient is known previously to provider    Telemedicine Visit: The patient's condition can be safely assessed and treated via synchronous audio and visual telemedicine encounter.      Reason for Telemedicine Visit: Patient has requested telehealth visit    Originating Site (Patient Location): Patient's home    Distant Site (Provider Location): [facility]    Consent:  The patient/guardian has verbally consented to: the potential risks and benefits of telemedicine (video visit) versus in person care; bill my insurance or make self-payment for services provided; and responsibility for payment of non-covered services.     Patient would like the video invitation sent by:  My Chart    Mode of Communication:  Video Conference via Amwell    Distant Location (Provider):  On-site    As the provider I attest to compliance with applicable laws and regulations related to telemedicine.    Visit Activities (Refresh list every visit): [facility] Only    Diagnostic Assessment Date: 23  Treatment Plan Review Date: 23  CGI Review Date: 23  Promis 10 Review Date:  23    Clinical Global Impressions  First:  Considering your total clinical experience with this particular patient population, how severe are the patient's symptoms at this time?: 4 (2023  5:02 PM)    Most recent:  No data recorded    See Flowsheets for today's PHQ-9 and MARY-7 results  Previous PHQ-9:   PHQ-9 SCORE 2/15/2023 3/7/2023 3/23/2023   PHQ-9 Total Score MyChart 9 (Mild  "depression) 6 (Mild depression) 8 (Mild depression)   PHQ-9 Total Score 9 6 8     Previous MARY-7:   MARY-7 SCORE 2/15/2023 3/7/2023 3/23/2023   Total Score 8 (mild anxiety) 10 (moderate anxiety) 9 (mild anxiety)   Total Score 8 10 9       DERIAN LEVEL:  No flowsheet data found.    DATA  Extended Session (60+ minutes): No  Interactive Complexity: No  Crisis: No  [facility] Patient: No    Treatment Objective(s) Addressed in This Session:  Target Behavior(s): increase socialization and activity level, and be more present    Depressed Mood: Increase interest, engagement, and pleasure in doing things  Decrease frequency and intensity of feeling down, depressed, hopeless  Feel less tired and more energy during the day   Identify negative self-talk and behaviors: challenge core beliefs, myths, and actions  Anxiety: will experience a reduction in anxiety, will develop more effective coping skills to manage anxiety symptoms, will develop healthy cognitive patterns and beliefs and will increase ability to function adaptively    Current Stressors / Issues:  Marielos presented with and reported anxious mood, noting anxiety may have increased since previous appt.  [facility] used MI and Solution Focused interventions to assess motivation/stage of change, elicit change talk, prompt identification of coping skills, and promote motivation further use of effective strategies.  Marielos responded well to interventions, citing several helpful activities, including yoga (at home), cooking, re-potted plants and knitting (\"its helps with my anxiety\").   Marielos reported her  home continues to be cluttered/disorganzed (\"Its a huge source of anxiety for me\", doesn't like to watch TV in the Living Area), so she had a conversation with partner about the clutter.  While the conversation was not an immediate success, she was glad she had the conversation and noted the change will occur over time and require multiple conversations.  [facility] prompted Marielos to identify " "other strategies that would help partner to address the clutter, she stated, \"We really need to redo our space...correspond to how were living in it\" and seeing how the neighbors use the space in their condo would help partner to visualize and get excited about making changes, resulting in more organized and less cluttered living environment.  Marielos expressed increasing motivation to address the living space, stating, \"Its saira reached a point after moving here\".    Finally, [facility] and Marielos reviewed coping strategies she can use to help manage her anxiety (outside of addressing living environment with partner).  Marielos replied, \"I gotta get out and take a walk\".  Marielos reported she is not getting out and walking/exercsing as much as she was a couple months ago.  Marielos also reported she would benefit from joining a Yoga Studio (\"It might be good for me\"), citing how it was helpful when living in [facility].--updated \"developing community\" goal by replacing [employer] skiing with yoga studio.       Progress on Treatment Objective(s) / Homework:  No improvement - CONTEMPLATION (Considering change and yet undecided); Intervened by assessing the negative and positive thinking (ambivalence) about behavior change    Motivational Interviewing    MI Intervention: Co-Developed Goal: Increase socialization and activity level; be more present, Expressed Empathy/Understanding, Supported Autonomy, Collaboration, Evocation, Open-ended questions, Reflections: simple and complex and Change talk (evoked)     Change Talk Expressed by the Patient: Desire to change Ability to change Reasons to change Need to change    Provider Response to Change Talk: E - Evoked more info from patient about behavior change, A - Affirmed patient's thoughts, decisions, or attempts at behavior change, R - Reflected patient's change talk and S - Summarized patient's change talk statements      Solution-Focused Therapy    Explore patterns in patient's " behaviors and relationships and discuss options for new behaviors    Explore new options for problem-solving, communication, managing stress, etc.      Interpersonal Psychotherapy    Explore patterns in relationships that are effective or ineffective at helping patient reach their goals, find satisfying experience.    Discuss new patterns or behaviors to engage in for improved social functioning.      Care Plan review completed: Yes    Medication Review:  No changes to current psychiatric medication(s)    Medication Compliance:  Yes    Changes in Health Issues:   None reported    Chemical Use Review:   Substance Use: Chemical use reviewed, no active concerns identified      Tobacco Use: No current tobacco use.      Assessment: Current Emotional / Mental Status (status of significant symptoms):  Risk status (Self / Other harm or suicidal ideation)  Patient has had a history of suicidal ideation: passive, never intent or plan; no SIB, no SA; most recent passive SI was years ago  Patient denies current fears or concerns for personal safety.  Patient denies current or recent suicidal ideation or behaviors.  Patient denies current or recent homicidal ideation or behaviors.  Patient denies current or recent self injurious behavior or ideation.  Patient denies other safety concerns.  A safety and risk management plan has not been developed at this time, however patient was encouraged to call [facility] should there be a change in any of these risk factors.    Appearance:   Appropriate   Eye Contact:   Good   Psychomotor Behavior: Normal   Attitude:   Cooperative   Orientation:   All  Speech   Rate / Production: Normal    Volume:  Normal   Mood:    Anxious    Affect:    congruent with mood   Thought Content:  Clear   Thought Form:  Coherent  Logical   Insight:    Good     Diagnoses:  1. Generalized anxiety disorder         Collateral Reports Completed:  Routed note to PCP    Plan: (Homework, other):  Patient was  "given information about behavioral services and encouraged to schedule a follow up appointment with the clinic [facility] in 2 weeks.  She was also given information about mental health symptoms and treatment options .  CD Recommendations: No indications of CD issues.  Shawn Ullrich [facility]      ______________________________________________________________________    Integrated Primary Care Behavioral Health Treatment Plan    Patient's Name: Marielos Marti  YOB: 1975    Date of Creation: 2/1/23  Date Treatment Plan Last Reviewed/Revised: 2/1/23    DSM5 Diagnoses: 296.32 (F33.1) Major Depressive Disorder, Recurrent Episode, Moderate _ or 300.02 (F41.1) Generalized Anxiety Disorder  Psychosocial / Contextual Factors: , Partnered, Female, heterosexual; full-time employment; Moved to [place] over 1 year ago  PROMIS (reviewed every 90 days): pt completed on 1/25/23    Referral / Collaboration:  Referral to another professional/service is not indicated at this time..    Anticipated number of session for this episode of care: 8  Anticipation frequency of session: Every other week  Anticipated Duration of each session: 38-52 minutes  Treatment plan will be reviewed in 90 days or when goals have been changed.       MeasurableTreatment Goal(s) related to diagnosis / functional impairment(s)  Goal 1: Patient will decrease anxiety and improve mood by developing community/supports in the area.     I will know I've met my goal when :  \"finding my community in the [place]\".    Goal 1: Join Yoga uido        Objective #A (Patient Action)    Patient will attend and participate in social or recreational activities to build community.  Status: New - Date: 2/1/23     Intervention(s)  Therapist will assign homework to brainstorm options to develop community and take one action that gets her closer to her goal weekly.       Goal 2: Patient will reduce anxiety by being more present in daily life; evidenced by " "learning 3 activities/strategies that promote being present     I will know I've met my goal when : \"Being still and being focused\"  -more present in daily life   -Baking forces her to be present    -will bake something this weekend  -other signs she's less anxious    -\"shoulders de-slump\"    -less shaky   -calm    -\"I'm sitting still\"   -Less active on my devices    Objective #A (Patient Action)    Status: New - Date: 2/1/23     Patient will practice one mindfulness activity daily.    Intervention(s)  Therapist will teach mindfulness activities and strategies.          Patient has reviewed and agreed to the above plan.      Shawn G. Ullrich, [facility]  February 1, 2023  "

## 2023-04-04 ENCOUNTER — VIRTUAL VISIT (OUTPATIENT)
Dept: BEHAVIORAL HEALTH | Facility: CLINIC | Age: 48
End: 2023-04-04
Payer: COMMERCIAL

## 2023-04-04 DIAGNOSIS — F41.1 GENERALIZED ANXIETY DISORDER: Primary | ICD-10-CM

## 2023-04-13 ENCOUNTER — VIRTUAL VISIT (OUTPATIENT)
Dept: FAMILY MEDICINE | Facility: CLINIC | Age: 48
End: 2023-04-13
Payer: COMMERCIAL

## 2023-04-13 DIAGNOSIS — N63.32 MASS OF AXILLARY TAIL OF LEFT BREAST: Primary | ICD-10-CM

## 2023-04-13 NOTE — NURSING NOTE
47 year old  Chief Complaint   Patient presents with     Follow Up     Discuss mammo results - breast center recommended surgical removal, just needing referral.       Last menstrual period 02/05/2023. There is no height or weight on file to calculate BMI.  Patient Active Problem List   Diagnosis     Seasonal allergic rhinitis     Depressive disorder     Herpes simplex     Generalized anxiety disorder       Wt Readings from Last 2 Encounters:   02/15/23 63.5 kg (140 lb)   01/19/23 63.1 kg (139 lb 1.3 oz)     BP Readings from Last 3 Encounters:   02/15/23 118/81   01/19/23 122/77         Current Outpatient Medications   Medication     sertraline (ZOLOFT) 100 MG tablet     SUMAtriptan (IMITREX) 25 MG tablet     valACYclovir (VALTREX) 500 MG tablet     No current facility-administered medications for this visit.       Social History     Tobacco Use     Smoking status: Never     Smokeless tobacco: Never   Vaping Use     Vaping status: Never Used   Substance Use Topics     Alcohol use: Yes     Comment: Moderate; glass of wine with dinner sometimes. 2-3 in a sitting.     Drug use: Yes     Types: Marijuana     Comment: Edible       Health Maintenance Due   Topic Date Due     DEPRESSION ACTION PLAN  Never done     HEPATITIS B IMMUNIZATION (1 of 3 - 3-dose series) Never done     COVID-19 Vaccine (2 - Pfizer series) 10/04/2022       No results found for: PAP      April 13, 2023 11:29 AM

## 2023-04-13 NOTE — PROGRESS NOTES
Marielos is a 47 year old who is being evaluated via a billable video visit.      How would you like to obtain your AVS? MyChart  If the video visit is dropped, the invitation should be resent by: Text to cell phone: [phone]  Will anyone else be joining your video visit? No      Assessment & Plan     Mass of axillary tail of left breast  Pt has a benign stable mass (prior biopsy) but due to size and pt discomfort would like a surgical consult for removal. Surgery referral placed and ph # given to schedule. Worrisome signs and symptoms were discussed with Marielos and she was instructed to return to the clinic for concerning symptoms or to call with questions.  - Adult General Surg Referral    Geni Whalen MD  [facility]    Subjective   Marielos is a 47 year old, presenting for the following health issues: Follow Up (Discuss mammo results - breast center recommended surgical removal, just needing referral.)    HPI     Marielos has a long-standing lump in her axilla, biopsy proven fibroepithelial lesion. Had recent re-imaging which showed it is stable. But due to size would like it removed.         Objective           Vitals:  No vitals were obtained today due to virtual visit.    Physical Exam   GENERAL: Healthy, alert and no distress  EYES: Eyes grossly normal to inspection.  No discharge or erythema, or obvious scleral/conjunctival abnormalities.  RESP: No audible wheeze, cough, or visible cyanosis.  No visible retractions or increased work of breathing.    SKIN: Visible skin clear. No significant rash, abnormal pigmentation or lesions.  NEURO: Cranial nerves grossly intact.  Mentation and speech appropriate for age.  PSYCH: Mentation appears normal, affect normal/bright, judgement and insight intact, normal speech and appearance well-groomed.    US breast left 3/21/2023:   Ultrasound of the left axilla was performed by technologist and radiologist. 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       Bilateral similar appearing circumscribed masses in both breasts, at least three and one in each breast, without significant change.         Video-Visit Details  Type of service:  Video Visit   Originating Location (pt. Location): Home  Distant Location (provider location):  On-site  Platform used for Video Visit: Ryder

## 2023-04-17 NOTE — PROGRESS NOTES
[facility]  2023    Behavioral Health Clinician Progress Note    Patient Name: Marielos Marti           Service Type:  Individual      Service Location:   Telehealth; see below     Session Start Time: 4:01 pm  Session End Time: 4:59 pm      Session Length: 53 - 60      Attendees: Patient     Service Modality:  Video Visit:      Provider verified identity through the following two step process.  Patient provided:  Patient , Patient address and Patient is known previously to provider    Telemedicine Visit: The patient's condition can be safely assessed and treated via synchronous audio and visual telemedicine encounter.      Reason for Telemedicine Visit: Patient has requested telehealth visit    Originating Site (Patient Location): Patient's home    Distant Site (Provider Location): [facility]    Consent:  The patient/guardian has verbally consented to: the potential risks and benefits of telemedicine (video visit) versus in person care; bill my insurance or make self-payment for services provided; and responsibility for payment of non-covered services.     Patient would like the video invitation sent by:  My Chart    Mode of Communication:  [facility]    Distant Location (Provider):  On-site    As the provider I attest to compliance with applicable laws and regulations related to telemedicine.    Visit Activities (Refresh list every visit): [facility] Only    Diagnostic Assessment Date: 23  Treatment Plan Review Date: 23  CGI Review Date: 23  Promis 10 Review Date:  23    Clinical Global Impressions  First:  Considering your total clinical experience with this particular patient population, how severe are the patient's symptoms at this time?: 4 (2023  5:02 PM)    Most recent:  No data recorded    See Flowsheets for today's PHQ-9 and MARY-7 results  Previous PHQ-9:       3/23/2023    11:28 AM 2023     4:00 PM 2023     1:50 PM   PHQ-9  "SCORE   PHQ-9 Total Score [place] 8 (Mild depression) 6 (Mild depression) 8 (Mild depression)   PHQ-9 Total Score 8 6 8     Previous MARY-7:       3/7/2023     1:01 PM 3/23/2023    11:28 AM 4/18/2023     1:50 PM   MARY-7 SCORE   Total Score 10 (moderate anxiety) 9 (mild anxiety) 13 (moderate anxiety)   Total Score 10 9 13       DERIAN LEVEL:       View : No data to display.                DATA  Extended Session (60+ minutes): No  Interactive Complexity: No  Crisis: No  [facility] Patient: No    Treatment Objective(s) Addressed in This Session:  Target Behavior(s): increase socialization and activity level, and be more present    Depressed Mood: Increase interest, engagement, and pleasure in doing things  Decrease frequency and intensity of feeling down, depressed, hopeless  Feel less tired and more energy during the day   Identify negative self-talk and behaviors: challenge core beliefs, myths, and actions  Anxiety: will experience a reduction in anxiety, will develop more effective coping skills to manage anxiety symptoms, will develop healthy cognitive patterns and beliefs and will increase ability to function adaptively    Current Stressors / Issues:    Marielos stated, \"The anxiety's been kicking lately...work has been stressful\", as well as discouraging interactions with mother and brother recently.  Marielos shared how her mood has been lower, as she realizes she's putting \"extra work\" into both her personal and work lives, while both are \"fruitles\".    [facility] provided support, validated her experience, and assisted with processing challenges in life areas.  Marielos explained her brother and mother are demanding more support from Marielos, while she's also having a difficult time and trying to take care of herself.  [place] but also feeling disheartened b/c she doesn't know if these interactions with family will fundamentally change, b/c they do not ask how she's doing, " "instead they ask more from her.   Then [facility] and Marielos focused on identifying coping strategies to manage the aforementioned challenges, Marielos reported she's engaged with friends for extra support (virtual yoga with friend, talked with other friends) and she's used interpersonal skills to manage interactions with family (redirect, setting boundaries, de-escalation, active listening.  Marielos reported engaging with supports is really effective and she continues to express change talk regarding desire to develop local supports/community (\"I still need to find a local group\").    Marielos then shared success regarding decreasing anxiety by improving home environment.  Marielos reported partner cleaned and organized condo, which helped her to feel better at home.  [facility] and Marielos reviewed ways she helped to make this happen, Marielos reported helping partner keep this topic at the forefront of his mind and encouraging him to see neighbors use of space, both helped partner to make progress to cleaning and organizing the home.     When identifying additional strategies to help decrease anxiety and improve mood, Marielos reported she wants to go for more bike rides.       Progress on Treatment Objective(s) / Homework:  Worsening - ACTION (Actively working towards change); Intervened by reinforcing change plan / affirming steps taken    Motivational Interviewing    MI Intervention: Co-Developed Goal: Increase socialization and activity level; be more present, Expressed Empathy/Understanding, Supported Autonomy, Collaboration, Evocation, Open-ended questions, Reflections: simple and complex and Change talk (evoked)     Change Talk Expressed by the Patient: Desire to change Ability to change Reasons to change Need to change    Provider Response to Change Talk: E - Evoked more info from patient about behavior change, A - Affirmed patient's thoughts, decisions, or attempts at behavior change, R - Reflected patient's change talk and S - " Summarized patient's change talk statements      Solution-Focused Therapy    Explore patterns in patient's behaviors and relationships and discuss options for new behaviors    Explore new options for problem-solving, communication, managing stress, etc.      Interpersonal Psychotherapy    Explore patterns in relationships that are effective or ineffective at helping patient reach their goals, find satisfying experience.    Discuss new patterns or behaviors to engage in for improved social functioning.      Care Plan review completed: Yes    Medication Review:  No changes to current psychiatric medication(s)    Medication Compliance:  Yes    Changes in Health Issues:   None reported    Chemical Use Review:   Substance Use: Chemical use reviewed, no active concerns identified      Tobacco Use: No current tobacco use.      Assessment: Current Emotional / Mental Status (status of significant symptoms):  Risk status (Self / Other harm or suicidal ideation)  Patient has had a history of suicidal ideation: passive, never intent or plan; no SIB, no SA; most recent passive SI was years ago  Patient denies current fears or concerns for personal safety.  Patient denies current or recent suicidal ideation or behaviors.  Patient denies current or recent homicidal ideation or behaviors.  Patient denies current or recent self injurious behavior or ideation.  Patient denies other safety concerns.  A safety and risk management plan has not been developed at this time, however patient was encouraged to call [facility] / [facility] should there be a change in any of these risk factors.    Appearance:   Appropriate   Eye Contact:   Good   Psychomotor Behavior: Normal   Attitude:   Cooperative   Orientation:   All  Speech   Rate / Production: Normal    Volume:  Normal   Mood:    Anxious  Depressed    Affect:    Worrisome   Thought Content:  Clear   Thought Form:  Coherent  Logical   Insight:    Good     Diagnoses:  1. MARY (generalized  "anxiety disorder)         Collateral Reports Completed:  Routed note to PCP    Plan: (Homework, other):  Patient was given information about behavioral services and encouraged to schedule a follow up appointment with the clinic [facility] in 3 weeks.  She was also given information about mental health symptoms and treatment options .  CD Recommendations: No indications of CD issues.  Shawn Ullrich [facility], [facility]      ______________________________________________________________________    Integrated Primary Care Behavioral Health Treatment Plan    Patient's Name: Marielos Marti  YOB: 1975    Date of Creation: 2/1/23  Date Treatment Plan Last Reviewed/Revised: 2/1/23    DSM5 Diagnoses: 296.32 (F33.1) Major Depressive Disorder, Recurrent Episode, Moderate _ or 300.02 (F41.1) Generalized Anxiety Disorder  Psychosocial / Contextual Factors: , Partnered, Female, heterosexual; full-time employment; Moved to [place] over 1 year ago  PROMIS (reviewed every 90 days): pt completed on 1/25/23    Referral / Collaboration:  Referral to another professional/service is not indicated at this time..    Anticipated number of session for this episode of care: 8  Anticipation frequency of session: Every other week  Anticipated Duration of each session: 38-52 minutes  Treatment plan will be reviewed in 90 days or when goals have been changed.       MeasurableTreatment Goal(s) related to diagnosis / functional impairment(s)  Goal 1: Patient will decrease anxiety and improve mood by developing community/supports in the area.     I will know I've met my goal when :  \"finding my community in the [facility]\".    Goal 1: Join Yoga uido        Objective #A (Patient Action)    Patient will attend and participate in social or recreational activities to build community.  Status: New - Date: 2/1/23     Intervention(s)  Therapist will assign homework to brainstorm options to develop community and take one action that gets her closer " "to her goal weekly.       Goal 2: Patient will reduce anxiety by being more present in daily life; evidenced by learning 3 activities/strategies that promote being present     I will know I've met my goal when : \"Being still and being focused\"  -more present in daily life   -Baking forces her to be present    -will bake something this weekend  -other signs she's less anxious    -\"shoulders de-slump\"    -less shaky   -calm    -\"I'm sitting still\"   -Less active on my devices    Objective #A (Patient Action)    Status: New - Date: 2/1/23     Patient will practice one mindfulness activity daily.    Intervention(s)  Therapist will teach mindfulness activities and strategies.          Patient has reviewed and agreed to the above plan.      Shawn G. Ullrich, [facility]  February 1, 2023  "

## 2023-04-18 ENCOUNTER — VIRTUAL VISIT (OUTPATIENT)
Dept: BEHAVIORAL HEALTH | Facility: CLINIC | Age: 48
End: 2023-04-18
Payer: COMMERCIAL

## 2023-04-18 DIAGNOSIS — F41.1 GAD (GENERALIZED ANXIETY DISORDER): Primary | ICD-10-CM

## 2023-04-18 ASSESSMENT — ANXIETY QUESTIONNAIRES
7. FEELING AFRAID AS IF SOMETHING AWFUL MIGHT HAPPEN: SEVERAL DAYS
5. BEING SO RESTLESS THAT IT IS HARD TO SIT STILL: NOT AT ALL
GAD7 TOTAL SCORE: 13
7. FEELING AFRAID AS IF SOMETHING AWFUL MIGHT HAPPEN: SEVERAL DAYS
6. BECOMING EASILY ANNOYED OR IRRITABLE: SEVERAL DAYS
1. FEELING NERVOUS, ANXIOUS, OR ON EDGE: NEARLY EVERY DAY
2. NOT BEING ABLE TO STOP OR CONTROL WORRYING: NEARLY EVERY DAY
3. WORRYING TOO MUCH ABOUT DIFFERENT THINGS: NEARLY EVERY DAY
4. TROUBLE RELAXING: MORE THAN HALF THE DAYS
IF YOU CHECKED OFF ANY PROBLEMS ON THIS QUESTIONNAIRE, HOW DIFFICULT HAVE THESE PROBLEMS MADE IT FOR YOU TO DO YOUR WORK, TAKE CARE OF THINGS AT HOME, OR GET ALONG WITH OTHER PEOPLE: SOMEWHAT DIFFICULT
8. IF YOU CHECKED OFF ANY PROBLEMS, HOW DIFFICULT HAVE THESE MADE IT FOR YOU TO DO YOUR WORK, TAKE CARE OF THINGS AT HOME, OR GET ALONG WITH OTHER PEOPLE?: SOMEWHAT DIFFICULT

## 2023-04-18 ASSESSMENT — PATIENT HEALTH QUESTIONNAIRE - PHQ9
10. IF YOU CHECKED OFF ANY PROBLEMS, HOW DIFFICULT HAVE THESE PROBLEMS MADE IT FOR YOU TO DO YOUR WORK, TAKE CARE OF THINGS AT HOME, OR GET ALONG WITH OTHER PEOPLE: SOMEWHAT DIFFICULT
SUM OF ALL RESPONSES TO PHQ QUESTIONS 1-9: 8
SUM OF ALL RESPONSES TO PHQ QUESTIONS 1-9: 8

## 2023-05-10 NOTE — PROGRESS NOTES
[facility]  May 11, 2023    Behavioral Health Clinician Progress Note    Patient Name: Marielos Marti           Service Type:  Individual      Service Location:   Telehealth; see below     Session Start Time: 4:02 pm  Session End Time: 4:58 pm      Session Length: 53 - 60      Attendees: Patient     Service Modality:  Video Visit:      Provider verified identity through the following two step process.  Patient provided:  Patient , Patient address and Patient is known previously to provider    Telemedicine Visit: The patient's condition can be safely assessed and treated via synchronous audio and visual telemedicine encounter.      Reason for Telemedicine Visit: Patient has requested telehealth visit    Originating Site (Patient Location): Patient's home    Distant Site (Provider Location): [facility]    Consent:  The patient/guardian has verbally consented to: the potential risks and benefits of telemedicine (video visit) versus in person care; bill my insurance or make self-payment for services provided; and responsibility for payment of non-covered services.     Patient would like the video invitation sent by:  My Chart    Mode of Communication:  Video Conference via [employer]    Distant Location (Provider):  On-site    As the provider I attest to compliance with applicable laws and regulations related to telemedicine.    Visit Activities (Refresh list every visit): [facility] Only    Diagnostic Assessment Date: 23  Treatment Plan Review Date: 23  CGI Review Date: 23  Promis 10 Review Date:  23     Clinical Global Impressions  First:  Considering your total clinical experience with this particular patient population, how severe are the patient's symptoms at this time?: 4 (2023  5:02 PM)    Most recent:  No data recorded    See Flowsheets for today's PHQ-9 and MARY-7 results  Previous PHQ-9:       2023     4:00 PM 2023     1:50 PM 2023     1:40 PM   PHQ-9  SCORE   PHQ-9 Total Score [place] 6 (Mild depression) 8 (Mild depression) 8 (Mild depression)   PHQ-9 Total Score 6 8 8     Previous MARY-7:       3/23/2023    11:28 AM 4/18/2023     1:50 PM 5/11/2023     1:40 PM   MARY-7 SCORE   Total Score 9 (mild anxiety) 13 (moderate anxiety) 10 (moderate anxiety)   Total Score 9 13 10       DERIAN LEVEL:       View : No data to display.                DATA  Extended Session (60+ minutes): No  Interactive Complexity: No  Crisis: No  [facility] Patient: No    Treatment Objective(s) Addressed in This Session:  Target Behavior(s): increase socialization and activity level, and be more present    Depressed Mood: Increase interest, engagement, and pleasure in doing things  Decrease frequency and intensity of feeling down, depressed, hopeless  Feel less tired and more energy during the day   Identify negative self-talk and behaviors: challenge core beliefs, myths, and actions  Anxiety: will experience a reduction in anxiety, will develop more effective coping skills to manage anxiety symptoms, will develop healthy cognitive patterns and beliefs and will increase ability to function adaptively    Current Stressors / Issues:  Marielos presented with mildly improved mood, and when [facility] reflected the change Marielos affirmed.  [facility] used MI and Solution Focused interventions to prompt identification of coping skills, Marielos responded by sharing her and partner have done the following:  -went to art museum 2x's  -met up with friends  -planning future social activity  -Went out to restaurant  -Went to the bookstore    Marielos acknowledged increased activity level, engagement with others, and improved interactions with partner have contributed to improving mood and more optimistic about the future.  [facility] and Marielos reviewed goals, Marielos reported she wants to increase physical activity level and continue to search for and develop her own community.  Marielos provided further detail, stating activity would mostly  include biking and yoga (hiking on the weekend? walking in general).  When exploring how to build community, Marielos stated she saw a [facility] and looked into classes. Marielos reported art and creativity have always been important to her, so building a community around a creative activity really appeals to her.   Finally, Marielos stated she's also open to engaging with others through volunteering/mutual aid.       Progress on Treatment Objective(s) / Homework:  Minimal progress - ACTION (Actively working towards change); Intervened by reinforcing change plan / affirming steps taken    Motivational Interviewing    MI Intervention: Co-Developed Goal: Increase socialization and activity level; be more present, Expressed Empathy/Understanding, Supported Autonomy, Collaboration, Evocation, Open-ended questions, Reflections: simple and complex and Change talk (evoked)     Change Talk Expressed by the Patient: Desire to change Ability to change Reasons to change Need to change    Provider Response to Change Talk: E - Evoked more info from patient about behavior change, A - Affirmed patient's thoughts, decisions, or attempts at behavior change, R - Reflected patient's change talk and S - Summarized patient's change talk statements      Solution-Focused Therapy    Explore patterns in patient's behaviors and relationships and discuss options for new behaviors    Explore new options for problem-solving, communication, managing stress, etc.    Care Plan review completed: Yes    Medication Review:  No changes to current psychiatric medication(s)    Medication Compliance:  Yes    Changes in Health Issues:   None reported    Chemical Use Review:   Substance Use: Chemical use reviewed, no active concerns identified      Tobacco Use: No current tobacco use.      Assessment: Current Emotional / Mental Status (status of significant symptoms):  Risk status (Self / Other harm or suicidal ideation)  Patient has had a history of  suicidal ideation: passive, never intent or plan; no SIB, no SA; most recent passive SI was years ago  Patient denies current fears or concerns for personal safety.  Patient denies current or recent suicidal ideation or behaviors.  Patient denies current or recent homicidal ideation or behaviors.  Patient denies current or recent self injurious behavior or ideation.  Patient denies other safety concerns.  A safety and risk management plan has not been developed at this time, however patient was encouraged to call [place] [phone] should there be a change in any of these risk factors.    Appearance:   Appropriate   Eye Contact:   Good   Psychomotor Behavior: Normal   Attitude:   Cooperative   Orientation:   All  Speech   Rate / Production: Normal    Volume:  Normal   Mood:    Anxious    Affect:    congruent with mood   Thought Content:  Clear   Thought Form:  Coherent  Logical   Insight:    Good     Diagnoses:  1. Generalized anxiety disorder         Collateral Reports Completed:  Routed note to PCP    Plan: (Homework, other):  Patient was given information about behavioral services and encouraged to schedule a follow up appointment with the clinic [facility] in 3 weeks.  She was also given information about mental health symptoms and treatment options .  CD Recommendations: No indications of CD issues.  Shawn Ullrich [facility], [facility]      ______________________________________________________________________    Integrated Primary Care Behavioral Health Treatment Plan    Patient's Name: Marielos Marti  YOB: 1975    Date of Creation: 5/11/23  Date Treatment Plan Last Reviewed/Revised: 5/11/23    DSM5 Diagnoses: 296.32 (F33.1) Major Depressive Disorder, Recurrent Episode, Moderate _ or 300.02 (F41.1) Generalized Anxiety Disorder  Psychosocial / Contextual Factors: , Partnered, Female, heterosexual; full-time employment;   PROMIS (reviewed every 90 days): pt completed on 5/11/23    Referral /  "Collaboration:  Referral to another professional/service is not indicated at this time..    Anticipated number of session for this episode of care: 8  Anticipation frequency of session: Every other week  Anticipated Duration of each session: 38-52 minutes  Treatment plan will be reviewed in 90 days or when goals have been changed.       MeasurableTreatment Goal(s) related to diagnosis / functional impairment(s)  Goal 1: Patient will decrease anxiety and improve mood by developing local support network and consistently engaging in physical activity (4x's/week)    I will know I've met my goal when :  \"finding my community in the [facility]\".      Objective #A (Patient Action)    Patient will attend and participate in social or recreational activities to build community around artistic/creative acivity.  Status: New - Date: 5/11/23     Intervention(s)  Therapist will assign homework to brainstorm options to develop community and take one action that gets her closer to her goal weekly.      -Less active on my devices    Objective #B (Patient Action)    Status: New - Date: 5/11/23     Patient will exercise for 30 minutes 4 times per week for 30 minutes.    Intervention(s)  Therapist will teach the client how to complete a 4-part pros and cons. to decrease ambivalence and increase motivation.      Patient has reviewed and agreed to the above plan.      Shawn G. Ullrich, [facility]  May 11, 2023  "

## 2023-05-11 ENCOUNTER — VIRTUAL VISIT (OUTPATIENT)
Dept: BEHAVIORAL HEALTH | Facility: CLINIC | Age: 48
End: 2023-05-11
Payer: COMMERCIAL

## 2023-05-11 DIAGNOSIS — F41.1 GENERALIZED ANXIETY DISORDER: Primary | ICD-10-CM

## 2023-05-11 ASSESSMENT — PATIENT HEALTH QUESTIONNAIRE - PHQ9
SUM OF ALL RESPONSES TO PHQ QUESTIONS 1-9: 8
SUM OF ALL RESPONSES TO PHQ QUESTIONS 1-9: 8
10. IF YOU CHECKED OFF ANY PROBLEMS, HOW DIFFICULT HAVE THESE PROBLEMS MADE IT FOR YOU TO DO YOUR WORK, TAKE CARE OF THINGS AT HOME, OR GET ALONG WITH OTHER PEOPLE: SOMEWHAT DIFFICULT

## 2023-05-11 ASSESSMENT — ANXIETY QUESTIONNAIRES
1. FEELING NERVOUS, ANXIOUS, OR ON EDGE: MORE THAN HALF THE DAYS
7. FEELING AFRAID AS IF SOMETHING AWFUL MIGHT HAPPEN: SEVERAL DAYS
5. BEING SO RESTLESS THAT IT IS HARD TO SIT STILL: NOT AT ALL
3. WORRYING TOO MUCH ABOUT DIFFERENT THINGS: MORE THAN HALF THE DAYS
7. FEELING AFRAID AS IF SOMETHING AWFUL MIGHT HAPPEN: SEVERAL DAYS
6. BECOMING EASILY ANNOYED OR IRRITABLE: SEVERAL DAYS
4. TROUBLE RELAXING: MORE THAN HALF THE DAYS
GAD7 TOTAL SCORE: 10
IF YOU CHECKED OFF ANY PROBLEMS ON THIS QUESTIONNAIRE, HOW DIFFICULT HAVE THESE PROBLEMS MADE IT FOR YOU TO DO YOUR WORK, TAKE CARE OF THINGS AT HOME, OR GET ALONG WITH OTHER PEOPLE: SOMEWHAT DIFFICULT
8. IF YOU CHECKED OFF ANY PROBLEMS, HOW DIFFICULT HAVE THESE MADE IT FOR YOU TO DO YOUR WORK, TAKE CARE OF THINGS AT HOME, OR GET ALONG WITH OTHER PEOPLE?: SOMEWHAT DIFFICULT
2. NOT BEING ABLE TO STOP OR CONTROL WORRYING: MORE THAN HALF THE DAYS
GAD7 TOTAL SCORE: 10
GAD7 TOTAL SCORE: 10

## 2023-05-16 ENCOUNTER — OFFICE VISIT (OUTPATIENT)
Dept: PLASTIC SURGERY | Facility: CLINIC | Age: 48
End: 2023-05-16
Attending: PLASTIC SURGERY
Payer: COMMERCIAL

## 2023-05-16 VITALS
OXYGEN SATURATION: 97 % | DIASTOLIC BLOOD PRESSURE: 84 MMHG | RESPIRATION RATE: 16 BRPM | TEMPERATURE: 99.1 F | WEIGHT: 141.6 LBS | HEART RATE: 89 BPM | BODY MASS INDEX: 26.06 KG/M2 | SYSTOLIC BLOOD PRESSURE: 125 MMHG | HEIGHT: 62 IN

## 2023-05-16 DIAGNOSIS — R22.32 MASS OF LEFT AXILLA: Primary | ICD-10-CM

## 2023-05-16 PROCEDURE — 99204 OFFICE O/P NEW MOD 45 MIN: CPT | Performed by: PLASTIC SURGERY

## 2023-05-16 PROCEDURE — 99213 OFFICE O/P EST LOW 20 MIN: CPT | Performed by: PLASTIC SURGERY

## 2023-05-16 RX ORDER — LORATADINE 10 MG/1
10 TABLET ORAL DAILY
COMMUNITY

## 2023-05-16 RX ORDER — FLUTICASONE PROPIONATE 50 MCG
SPRAY, SUSPENSION (ML) NASAL
COMMUNITY
Start: 2023-04-27

## 2023-05-16 RX ORDER — ALBUTEROL SULFATE 90 UG/1
AEROSOL, METERED RESPIRATORY (INHALATION) PRN
COMMUNITY
Start: 2023-04-27

## 2023-05-16 ASSESSMENT — PAIN SCALES - GENERAL: PAINLEVEL: NO PAIN (0)

## 2023-05-16 NOTE — PROGRESS NOTES
CONSULT NOTE    REFERRING PROVIDER:  Geni Whalen MD    PRESENTING COMPLAINT:  Consultation for a left axillary mass.    HISTORY OF PRESENTING COMPLAINT:  Ms. Marti is 47 years old.  She noticed a mass in her left axilla after she had her COVID vaccine back in 2021.  This was [place]  It was biopsied.  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 Ever since then, the lesion has slowly grown in size, not caused her any pain, no discharge, no other masses, but recently in March of this year underwent a mammogram and ultrasound here at the [facility] Radiology Department.  They had read this as a most likely benign lesion that had not significantly changed since 2021.  She now would like it removed.    PAST MEDICAL HISTORY:  Nil.    PAST SURGICAL HISTORY:  Nil.    MEDICATIONS:  Zoloft.    ALLERGIES:  Nil.    SOCIAL HISTORY:  Does not smoke, socially drinks. Is a  and lives in the [facility].    REVIEW OF SYSTEMS:  Denies chest pain, shortness of breath, MI, CVA, DVT and PE.    PHYSICAL EXAMINATION:  Vital signs are stable.  She is afebrile, in no obvious distress.  She is 5 feet 2 inches, 140 pounds, BMI 26 kg/m2.  On examination of her left axilla, she has a 4 x 4 cm, subcutaneous, firm nodule in the left axilla, nontender.    ASSESSMENT AND PLAN:  Based upon the above findings, the diagnosis of a lymph node versus mass was made.  Given the fact it has been benign based on previous pathology, I think an excision can be undertaken.  I would like to see the pathology results before undertaking the excision. As long as I can confirm it is benign, I am happy to remove this; otherwise, Surgical Oncology should be consulted.  She understood the plan.  We will try and get the pathology results and talk to Radiology here at the [facility] to see if a biopsy was not done because of the previous pathology results, and if so, then we can proceed with  excision.  She understood the plan and agreed.  Once I have the green light to proceed, we will schedule this.  I explained to her how it would be done.  All risks, benefits and alternatives of the procedure, including pain, infection, bleeding, scarring, asymmetry, seromas, hematomas, wound breakdown, wound dehiscence, injury to deeper structures like nerves, vessels and tendons, requirement of further surgery depending on pathology, numbness of the armpit and arm, swelling of the arm, lymphedema, DVT, PE, MI, CVA, pneumonia and death, were explained.  She understood everything and wants to proceed.  I look forward to helping her out in the near future.  All her questions were answered.  She was happy with the visit.  All exam and discussion done in the presence of my nurse, Shayna Parikh.    Total time spent in the encounter today, including chart review, the visit itself and post visit paperwork, was 45 minutes.    Sarah Hanna MD     cc:  Geni Whalen MD  [facility]  [address] [address]

## 2023-05-16 NOTE — LETTER
5/16/2023         RE: Marielos Marti  [address]  [place] [address]        Dear Colleague,    Thank you for referring your patient, Marielos Marti, to the [facility] BRE[facility]. Please see a copy of my visit note below.    CONSULT NOTE    REFERRING PROVIDER:  Geni Whalen MD    PRESENTING COMPLAINT:  Consultation for a left axillary mass.    HISTORY OF PRESENTING COMPLAINT:  Ms. Marti is 47 years old.  She noticed a mass in her left axilla after she had her COVID vaccine back in 2021.  This was in [facility].  It was biopsied.  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 Ever since then, the lesion has slowly grown in size, not caused her any pain, no discharge, no other masses, but recently in March of this year underwent a mammogram and ultrasound here at the [facility] Radiology Department.  They had read this as a most likely benign lesion that had not significantly changed since 2021.  She now would like it removed.    PAST MEDICAL HISTORY:  Nil.    PAST SURGICAL HISTORY:  Nil.    MEDICATIONS:  Zoloft.    ALLERGIES:  Nil.    SOCIAL HISTORY:  Does not smoke, socially drinks. Is a  and lives in the [facility].    REVIEW OF SYSTEMS:  Denies chest pain, shortness of breath, MI, CVA, DVT and PE.    PHYSICAL EXAMINATION:  Vital signs are stable.  She is afebrile, in no obvious distress.  She is 5 feet 2 inches, 140 pounds, BMI 26 kg/m2.  On examination of her left axilla, she has a 4 x 4 cm, subcutaneous, firm nodule in the left axilla, nontender.    ASSESSMENT AND PLAN:  Based upon the above findings, the diagnosis of a lymph node versus mass was made.  Given the fact it has been benign based on previous pathology, I think an excision can be undertaken.  I would like to see the pathology results before undertaking the excision. As long as I can confirm it is benign, I am happy to remove this;  otherwise, Surgical Oncology should be consulted.  She understood the plan.  We will try and get the pathology results and talk to Radiology here at the [facility] to see if a biopsy was not done because of the previous pathology results, and if so, then we can proceed with excision.  She understood the plan and agreed.  Once I have the green light to proceed, we will schedule this.  I explained to her how it would be done.  All risks, benefits and alternatives of the procedure, including pain, infection, bleeding, scarring, asymmetry, seromas, hematomas, wound breakdown, wound dehiscence, injury to deeper structures like nerves, vessels and tendons, requirement of further surgery depending on pathology, numbness of the armpit and arm, swelling of the arm, lymphedema, DVT, PE, MI, CVA, pneumonia and death, were explained.  She understood everything and wants to proceed.  I look forward to helping her out in the near future.  All her questions were answered.  She was happy with the visit.  All exam and discussion done in the presence of my nurse, Shayna Parikh.    Total time spent in the encounter today, including chart review, the visit itself and post visit paperwork, was 45 minutes.    Sarah Hanna MD     cc:  Geni Whalen MD  [facility]  [address] [address]

## 2023-05-16 NOTE — NURSING NOTE
"Oncology Rooming Note    May 16, 2023 8:54 AM   Marielos Marti is a 47 year old female who presents for:    Chief Complaint   Patient presents with     New Patient     Breast lump      Initial Vitals: /84 (BP Location: Right arm, Patient Position: Sitting, Cuff Size: Adult Regular)   Pulse 89   Temp 99.1  F (37.3  C) (Oral)   Resp 16   Ht 1.576 m (5' 2.05\")   Wt 64.2 kg (141 lb 9.6 oz)   SpO2 97%   BMI 25.86 kg/m   Estimated body mass index is 25.86 kg/m  as calculated from the following:    Height as of this encounter: 1.576 m (5' 2.05\").    Weight as of this encounter: 64.2 kg (141 lb 9.6 oz). Body surface area is 1.68 meters squared.  No Pain (0) Comment: Data Unavailable   No LMP recorded.  Allergies reviewed: Yes  Medications reviewed: Yes    Medications: Medication refills not needed today.  Pharmacy name entered into [employer]:    [id number] - [place], [place] - [address] [facility] [address]  [id number] - [place], [place] - [address]    Clinical concerns: New patient.       Nicole Shaffer CMA            "

## 2023-05-19 ENCOUNTER — PATIENT OUTREACH (OUTPATIENT)
Dept: PLASTIC SURGERY | Facility: CLINIC | Age: 48
End: 2023-05-19
Payer: COMMERCIAL

## 2023-05-19 DIAGNOSIS — R22.32 MASS OF LEFT AXILLA: Primary | ICD-10-CM

## 2023-05-19 RX ORDER — CEFAZOLIN SODIUM 2 G/50ML
2 SOLUTION INTRAVENOUS SEE ADMIN INSTRUCTIONS
Status: CANCELLED | OUTPATIENT
Start: 2023-05-19

## 2023-05-19 RX ORDER — CEFAZOLIN SODIUM 2 G/50ML
2 SOLUTION INTRAVENOUS
Status: CANCELLED | OUTPATIENT
Start: 2023-05-19

## 2023-05-19 NOTE — TELEPHONE ENCOUNTER
LM for pt informing her that Dr Hanna spoke with radiologist who did her recent breast imaging, they [place] attached to her chart in PACS) and plan now is to move ahead with excision of tissue. Dr Hanna will place CR and pt may schedule, we will see her back before surgery to go over the plan. Gave her my number to call if needed, and also will send same message on [employer].

## 2023-05-30 ENCOUNTER — TELEPHONE (OUTPATIENT)
Dept: PLASTIC SURGERY | Facility: CLINIC | Age: 48
End: 2023-05-30
Payer: COMMERCIAL

## 2023-05-30 NOTE — TELEPHONE ENCOUNTER
Left voicemail for patient regarding scheduling surgery with Dr. Hanna.    Provided contact number to discuss.    P: [phone]    __    Milagros Scott, Senior Perioperative Coordinator, on 5/30/2023 at 10:31 AM

## 2023-05-31 NOTE — PROGRESS NOTES
[facility]  2023    Behavioral Health Clinician Progress Note    Patient Name: Marielos Marti           Service Type:  Individual      Service Location:   Telehealth; see below     Session Start Time: 11:13 am  Session End Time: 11:59 am      Session Length: 38 - 52      Attendees: Patient     Service Modality:  Video Visit:      Provider verified identity through the following two step process.  Patient provided:  Patient , Patient address and Patient is known previously to provider    Telemedicine Visit: The patient's condition can be safely assessed and treated via synchronous audio and visual telemedicine encounter.      Reason for Telemedicine Visit: Patient has requested telehealth visit    Originating Site (Patient Location): Patient's home    Distant Site (Provider Location): [facility]    Consent:  The patient/guardian has verbally consented to: the potential risks and benefits of telemedicine (video visit) versus in person care; bill my insurance or make self-payment for services provided; and responsibility for payment of non-covered services.     Patient would like the video invitation sent by:  My Chart    Mode of Communication:  Video Conference via [facility]    Distant Location (Provider):  On-site    As the provider I attest to compliance with applicable laws and regulations related to telemedicine.    Visit Activities (Refresh list every visit): [facility] Only    Diagnostic Assessment Date: 23  Treatment Plan Review Date: 23  CGI Review Date: 23  Promis 10 Review Date:  23     Clinical Global Impressions  First:  Considering your total clinical experience with this particular patient population, how severe are the patient's symptoms at this time?: 4 (2023  5:22 PM)    Most recent:  Compared to the patient's condition at the START of treatment, this patient's condition is: 3 (2023  5:22 PM)      See Flowsheets for today's PHQ-9 and MARY-7  "results  Previous PHQ-9:       4/18/2023     1:50 PM 5/11/2023     1:40 PM 6/1/2023     9:44 AM   PHQ-9 SCORE   PHQ-9 Total Score MyChart 8 (Mild depression) 8 (Mild depression) 9 (Mild depression)   PHQ-9 Total Score 8 8 9     Previous MARY-7:       4/18/2023     1:50 PM 5/11/2023     1:40 PM 6/1/2023     9:45 AM   MARY-7 SCORE   Total Score 13 (moderate anxiety) 10 (moderate anxiety) 13 (moderate anxiety)   Total Score 13 10 13       DERIAN LEVEL:       View : No data to display.                DATA  Extended Session (60+ minutes): No  Interactive Complexity: No  Crisis: No  [facility] Patient: No    Treatment Objective(s) Addressed in This Session:  Target Behavior(s): increase socialization and activity level, and be more present    Depressed Mood: Increase interest, engagement, and pleasure in doing things  Decrease frequency and intensity of feeling down, depressed, hopeless  Feel less tired and more energy during the day   Identify negative self-talk and behaviors: challenge core beliefs, myths, and actions  Anxiety: will experience a reduction in anxiety, will develop more effective coping skills to manage anxiety symptoms, will develop healthy cognitive patterns and beliefs and will increase ability to function adaptively    Current Stressors / Issues:  Marielos reported making some progress toward goals, sharing she attended a social event, met people, and made plans with new people.  Marielos reported future plans are without partner, and could be part of building her own support network.  In addition, Marielos got a new bike which makes her excited to engage in a healthy activity and \"I have a little more control over my environment\" (her and partner only have one car; feels more mobility and freedom).  [facility] used MI interventions to elicit change talk and continue to stay in preparation/action stage of change, Marielos responded by sharing she continues to attend online yoga classes and her next step toward " "building/connecting with a community is \"I do want to find a yoga studio this summer\".  Marielos expressed change talk, stating,she \"felt energized talking to people\" and it \"helped me feel more confident\".    While Marielos reported the above progress, she reported increased anxiety about visiting family next week.  Marielos shared history with mother and brother, stating \"it's real complicated to see my family\", \"it comes with the attendant baggage\".  Marielos reported her family has made her feel guilty about moving away and never once visited her when residing in [place] for 20 years, so it feels \"crappy to not even be on their radar\".  Marielos continued to share about her experience, \"I feel obligated\" to visit and when she comes home they give her \"chores\" to do.  When sharing and discussing the visit and her experience, she stated, \"I feel my anxiety\".  After providing support, validating her experience and reflecting her thoughts/emotions, [facility] and Deanna identified positive reasons to visit and strategies she can use to change her experience and increase enjoyment.  Marielos stated, \"I want to spend more time with my niece and nephew\", she can set boundaries and tell brother no, she will try to be less \"on guard\", and offer to help by spending time with kids.     Finally, Marielos shared she is looking forward to attending July work conference in [place] and reconnecting with colleagues.       Progress on Treatment Objective(s) / Homework:  Minimal progress - ACTION (Actively working towards change); Intervened by reinforcing change plan / affirming steps taken    Motivational Interviewing    MI Intervention: Co-Developed Goal: Increase socialization and activity level; be more present, Expressed Empathy/Understanding, Supported Autonomy, Collaboration, Evocation, Open-ended questions, Reflections: simple and complex and Change talk (evoked)     Change Talk Expressed by the Patient: Desire to change Ability to change Reasons " to change Need to change    Provider Response to Change Talk: E - Evoked more info from patient about behavior change, A - Affirmed patient's thoughts, decisions, or attempts at behavior change, R - Reflected patient's change talk and S - Summarized patient's change talk statements      Psycho-education regarding mental health diagnoses and treatment options    Psychodynamic psychotherapy    Discuss patient's emotional dynamics and issues and how they impact behaviors    Explore patient's history of relationships and how they impact present behaviors    Explore how to work with and make changes in these schemas and patterns    Interpersonal Psychotherapy    Explore patterns in relationships that are effective or ineffective at helping patient reach their goals, find satisfying experience.    Discuss new patterns or behaviors to engage in for improved social functioning.      Care Plan review completed: Yes    Medication Review:  No changes to current psychiatric medication(s)    Medication Compliance:  Yes    Changes in Health Issues:   None reported    Chemical Use Review:   Substance Use: Chemical use reviewed, no active concerns identified      Tobacco Use: No current tobacco use.      Assessment: Current Emotional / Mental Status (status of significant symptoms):  Risk status (Self / Other harm or suicidal ideation)  Patient has had a history of suicidal ideation: passive, never intent or plan; no SIB, no SA; most recent passive SI was years ago  Patient denies current fears or concerns for personal safety.  Patient denies current or recent suicidal ideation or behaviors.  Patient denies current or recent homicidal ideation or behaviors.  Patient denies current or recent self injurious behavior or ideation.  Patient denies other safety concerns.  A safety and risk management plan has not been developed at this time, however patient was encouraged to call [facility] [place] [facility] should there be a change in any of  these risk factors.    Appearance:   Appropriate   Eye Contact:   Good   Psychomotor Behavior: Normal   Attitude:   Cooperative   Orientation:   All  Speech   Rate / Production: Normal    Volume:  Normal   Mood:    Anxious    Affect:    congruent with mood   Thought Content:  Clear   Thought Form:  Coherent  Logical   Insight:    Good     Diagnoses:  1. MARY (generalized anxiety disorder)         Collateral Reports Completed:  Routed note to PCP    Plan: (Homework, other):  Patient was given information about behavioral services and encouraged to schedule a follow up appointment with the clinic [facility] in 1 month.  She was also given information about mental health symptoms and treatment options .  CD Recommendations: No indications of CD issues.  Shawn Ullrich [facility], [facility]      ______________________________________________________________________    Integrated Primary Care Behavioral Health Treatment Plan    Patient's Name: Marielos Marti  YOB: 1975    Date of Creation: 5/11/23  Date Treatment Plan Last Reviewed/Revised: 5/11/23    DSM5 Diagnoses: 296.32 (F33.1) Major Depressive Disorder, Recurrent Episode, Moderate _ or 300.02 (F41.1) Generalized Anxiety Disorder  Psychosocial / Contextual Factors: , Partnered, Female, heterosexual; full-time employment;   PROMIS (reviewed every 90 days): pt completed on 5/11/23    Referral / Collaboration:  Referral to another professional/service is not indicated at this time..    Anticipated number of session for this episode of care: 8  Anticipation frequency of session: Every other week  Anticipated Duration of each session: 38-52 minutes  Treatment plan will be reviewed in 90 days or when goals have been changed.       MeasurableTreatment Goal(s) related to diagnosis / functional impairment(s)  Goal 1: Patient will decrease anxiety and improve mood by developing local support network and consistently engaging in physical activity (4x's/week)    I will  "know I've met my goal when :  \"finding my community in the [facility]\".      Objective #A (Patient Action)    Patient will attend and participate in social or recreational activities to build community around artistic/creative acivity.  Status: New - Date: 5/11/23     Intervention(s)  Therapist will assign homework to brainstorm options to develop community and take one action that gets her closer to her goal weekly.      -Less active on my devices    Objective #B (Patient Action)    Status: New - Date: 5/11/23     Patient will exercise for 30 minutes 4 times per week for 30 minutes.    Intervention(s)  Therapist will teach the client how to complete a 4-part pros and cons. to decrease ambivalence and increase motivation.      Patient has reviewed and agreed to the above plan.      Shawn G. Ullrich, [facility]  May 11, 2023  "

## 2023-06-01 ENCOUNTER — VIRTUAL VISIT (OUTPATIENT)
Dept: BEHAVIORAL HEALTH | Facility: CLINIC | Age: 48
End: 2023-06-01
Payer: COMMERCIAL

## 2023-06-01 DIAGNOSIS — F41.1 GAD (GENERALIZED ANXIETY DISORDER): Primary | ICD-10-CM

## 2023-06-01 ASSESSMENT — ANXIETY QUESTIONNAIRES
7. FEELING AFRAID AS IF SOMETHING AWFUL MIGHT HAPPEN: MORE THAN HALF THE DAYS
7. FEELING AFRAID AS IF SOMETHING AWFUL MIGHT HAPPEN: MORE THAN HALF THE DAYS
IF YOU CHECKED OFF ANY PROBLEMS ON THIS QUESTIONNAIRE, HOW DIFFICULT HAVE THESE PROBLEMS MADE IT FOR YOU TO DO YOUR WORK, TAKE CARE OF THINGS AT HOME, OR GET ALONG WITH OTHER PEOPLE: SOMEWHAT DIFFICULT
GAD7 TOTAL SCORE: 13
3. WORRYING TOO MUCH ABOUT DIFFERENT THINGS: NEARLY EVERY DAY
5. BEING SO RESTLESS THAT IT IS HARD TO SIT STILL: NOT AT ALL
GAD7 TOTAL SCORE: 13
8. IF YOU CHECKED OFF ANY PROBLEMS, HOW DIFFICULT HAVE THESE MADE IT FOR YOU TO DO YOUR WORK, TAKE CARE OF THINGS AT HOME, OR GET ALONG WITH OTHER PEOPLE?: SOMEWHAT DIFFICULT
GAD7 TOTAL SCORE: 13
1. FEELING NERVOUS, ANXIOUS, OR ON EDGE: NEARLY EVERY DAY
6. BECOMING EASILY ANNOYED OR IRRITABLE: NOT AT ALL
2. NOT BEING ABLE TO STOP OR CONTROL WORRYING: NEARLY EVERY DAY
4. TROUBLE RELAXING: MORE THAN HALF THE DAYS

## 2023-06-01 ASSESSMENT — PATIENT HEALTH QUESTIONNAIRE - PHQ9
SUM OF ALL RESPONSES TO PHQ QUESTIONS 1-9: 9
SUM OF ALL RESPONSES TO PHQ QUESTIONS 1-9: 9
10. IF YOU CHECKED OFF ANY PROBLEMS, HOW DIFFICULT HAVE THESE PROBLEMS MADE IT FOR YOU TO DO YOUR WORK, TAKE CARE OF THINGS AT HOME, OR GET ALONG WITH OTHER PEOPLE: SOMEWHAT DIFFICULT

## 2023-06-02 PROBLEM — R22.32 MASS OF LEFT AXILLA: Status: ACTIVE | Noted: 2023-06-02

## 2023-06-02 NOTE — TELEPHONE ENCOUNTER
RN Care Coordinator: Shayna Parikh; [phone]    Surgery is scheduled with Dr. Hanna   Date: 8/3   [facility]  Scheduled per: patient requested timeframe    H&P to be completed by: Primary Care team; patient to schedule    Surgical consult: 7/26 with Dr. Hanna [facility]    Post-op: 8/18 with Luann Ramesh PA-C, [facility]    Patient will receive a phone call from pre-admission nurses 1-2 days prior to surgery with arrival and start time.       Spoke with the patient and was able to confirm all scheduled information.       Patient questions/concerns: N/A       Surgery packet: was sent via [employer]    __    Milagros Scott, Senior Perioperative Coordinator, on 6/2/2023 at 10:37 AM  P: [phone]

## 2023-06-26 ASSESSMENT — ANXIETY QUESTIONNAIRES
1. FEELING NERVOUS, ANXIOUS, OR ON EDGE: NEARLY EVERY DAY
3. WORRYING TOO MUCH ABOUT DIFFERENT THINGS: NEARLY EVERY DAY
6. BECOMING EASILY ANNOYED OR IRRITABLE: SEVERAL DAYS
IF YOU CHECKED OFF ANY PROBLEMS ON THIS QUESTIONNAIRE, HOW DIFFICULT HAVE THESE PROBLEMS MADE IT FOR YOU TO DO YOUR WORK, TAKE CARE OF THINGS AT HOME, OR GET ALONG WITH OTHER PEOPLE: VERY DIFFICULT
4. TROUBLE RELAXING: NEARLY EVERY DAY
2. NOT BEING ABLE TO STOP OR CONTROL WORRYING: NEARLY EVERY DAY
7. FEELING AFRAID AS IF SOMETHING AWFUL MIGHT HAPPEN: NEARLY EVERY DAY
GAD7 TOTAL SCORE: 17
8. IF YOU CHECKED OFF ANY PROBLEMS, HOW DIFFICULT HAVE THESE MADE IT FOR YOU TO DO YOUR WORK, TAKE CARE OF THINGS AT HOME, OR GET ALONG WITH OTHER PEOPLE?: VERY DIFFICULT
5. BEING SO RESTLESS THAT IT IS HARD TO SIT STILL: SEVERAL DAYS
GAD7 TOTAL SCORE: 17
7. FEELING AFRAID AS IF SOMETHING AWFUL MIGHT HAPPEN: NEARLY EVERY DAY
GAD7 TOTAL SCORE: 17

## 2023-06-26 ASSESSMENT — PATIENT HEALTH QUESTIONNAIRE - PHQ9
SUM OF ALL RESPONSES TO PHQ QUESTIONS 1-9: 9
10. IF YOU CHECKED OFF ANY PROBLEMS, HOW DIFFICULT HAVE THESE PROBLEMS MADE IT FOR YOU TO DO YOUR WORK, TAKE CARE OF THINGS AT HOME, OR GET ALONG WITH OTHER PEOPLE: VERY DIFFICULT
SUM OF ALL RESPONSES TO PHQ QUESTIONS 1-9: 9

## 2023-06-26 NOTE — PROGRESS NOTES
[facility]  2023    Behavioral Health Clinician Progress Note    Patient Name: Marielos Marti           Service Type:  Individual      Service Location:   Telehealth; see below     Session Start Time: 4:01 pm  Session End Time: 4:59 pm      Session Length: 53 - 60      Attendees: Patient     Service Modality:  Video Visit:      Provider verified identity through the following two step process.  Patient provided:  Patient , Patient address and Patient is known previously to provider    Telemedicine Visit: The patient's condition can be safely assessed and treated via synchronous audio and visual telemedicine encounter.      Reason for Telemedicine Visit: Patient has requested telehealth visit    Originating Site (Patient Location): Patient's home    Distant Site (Provider Location): [facility]    Consent:  The patient/guardian has verbally consented to: the potential risks and benefits of telemedicine (video visit) versus in person care; bill my insurance or make self-payment for services provided; and responsibility for payment of non-covered services.     Patient would like the video invitation sent by:  My Chart    Mode of Communication:  Video Conference via Amwell    Distant Location (Provider):  On-site    As the provider I attest to compliance with applicable laws and regulations related to telemedicine.    Visit Activities (Refresh list every visit): [facility] Only    Diagnostic Assessment Date: 23  Treatment Plan Review Date: 23  CGI Review Date: 23  Promis 10 Review Date:  23     Clinical Global Impressions  First:  Considering your total clinical experience with this particular patient population, how severe are the patient's symptoms at this time?: 4 (2023  5:22 PM)    Most recent:  Compared to the patient's condition at the START of treatment, this patient's condition is: 3 (2023  5:22 PM)      See Flowsheets for today's PHQ-9 and MARY-7  "results  Previous PHQ-9:       5/11/2023     1:40 PM 6/1/2023     9:44 AM 6/26/2023     4:10 PM   PHQ-9 SCORE   PHQ-9 Total Score MyChart 8 (Mild depression) 9 (Mild depression) 9 (Mild depression)   PHQ-9 Total Score 8 9 9     Previous MARY-7:       5/11/2023     1:40 PM 6/1/2023     9:45 AM 6/26/2023     4:10 PM   MARY-7 SCORE   Total Score 10 (moderate anxiety) 13 (moderate anxiety) 17 (severe anxiety)   Total Score 10 13 17       DERIAN LEVEL:       No data to display                DATA  Extended Session (60+ minutes): No  Interactive Complexity: No  Crisis: No  [facility] Patient: No    Treatment Objective(s) Addressed in This Session:  Target Behavior(s): increase socialization and activity level, and be more present    Depressed Mood: Increase interest, engagement, and pleasure in doing things  Decrease frequency and intensity of feeling down, depressed, hopeless  Feel less tired and more energy during the day   Identify negative self-talk and behaviors: challenge core beliefs, myths, and actions  Anxiety: will experience a reduction in anxiety, will develop more effective coping skills to manage anxiety symptoms, will develop healthy cognitive patterns and beliefs and will increase ability to function adaptively    Current Stressors / Issues:  Marielos reported the trip to [place] to see family was both \"nice\" and stressful, explaining she experienced some of the anticipated issues identified during previous session. What's bee more stressful is her partner's father was recently presented to the hospital, but when they attempted to admit him, partner's father became verbally aggressive with hospital staff and has since left the hospital.  Marielos reported he's dealing with \"end of life issues\".  Marielos reported while trying to help and support her partner's father is difficult in and of itself, the worst part is they are being judged and criticized by other family members about the support/care they are providing.  Marielos " "stated, \"I got pulled into the middle of it yesterday...Its such a mess\".  [facility] assisted with processing the stress by providing support, validating her experience and also reflecting thoughts/emotions.  Marielos responded by stating she nor her partner are being recognized as individuals or valued family members, but rather they are there to serve others. Finally, [facility] and Marielos identified coping strategies that support and recognize her (and her partner) as priorities, including spending time together, focusing on quality of relationship, enjoying each other, planning future travel, and taking care of each other.     Marielos is looking forward to attending July work conference in [place] and reconnecting with colleagues.       Progress on Treatment Objective(s) / Homework:  Worsening - CONTEMPLATION (Considering change and yet undecided); Intervened by assessing the negative and positive thinking (ambivalence) about behavior change    Motivational Interviewing    MI Intervention: Co-Developed Goal: Increase socialization and activity level; be more present, Expressed Empathy/Understanding, Supported Autonomy, Collaboration, Evocation, Open-ended questions, Reflections: simple and complex and Change talk (evoked)     Change Talk Expressed by the Patient: Desire to change Ability to change Reasons to change Need to change    Provider Response to Change Talk: E - Evoked more info from patient about behavior change, A - Affirmed patient's thoughts, decisions, or attempts at behavior change, R - Reflected patient's change talk and S - Summarized patient's change talk statements      Psycho-education regarding mental health diagnoses and treatment options    Psychodynamic psychotherapy    Discuss patient's emotional dynamics and issues and how they impact behaviors    Explore patient's history of relationships and how they impact present behaviors    Explore how to work with and make changes in these schemas and " patterns    Interpersonal Psychotherapy    Explore patterns in relationships that are effective or ineffective at helping patient reach their goals, find satisfying experience.    Discuss new patterns or behaviors to engage in for improved social functioning.      Care Plan review completed: Yes    Medication Review:  No changes to current psychiatric medication(s)    Medication Compliance:  Yes    Changes in Health Issues:   None reported    Chemical Use Review:   Substance Use: Chemical use reviewed, no active concerns identified      Tobacco Use: No current tobacco use.      Assessment: Current Emotional / Mental Status (status of significant symptoms):  Risk status (Self / Other harm or suicidal ideation)  Patient has had a history of suicidal ideation: passive, never intent or plan; no SIB, no SA; most recent passive SI was years ago  Patient denies current fears or concerns for personal safety.  Patient denies current or recent suicidal ideation or behaviors.  Patient denies current or recent homicidal ideation or behaviors.  Patient denies current or recent self injurious behavior or ideation.  Patient denies other safety concerns.  A safety and risk management plan has not been developed at this time, however patient was encouraged to call [place] [phone] should there be a change in any of these risk factors.    Appearance:   Appropriate   Eye Contact:   Good   Psychomotor Behavior: Normal   Attitude:   Cooperative   Orientation:   All  Speech   Rate / Production: Normal    Volume:  Normal   Mood:    Anxious  Depressed    Affect:    Worrisome   Thought Content:  Clear   Thought Form:  Coherent  Logical   Insight:    Good     Diagnoses:  1. MARY (generalized anxiety disorder)         Collateral Reports Completed:  Routed note to PCP    Plan: (Homework, other):  Patient was given information about behavioral services and encouraged to schedule a follow up appointment with the clinic [facility] in 1 month.  She was  "also given information about mental health symptoms and treatment options .  CD Recommendations: No indications of CD issues.  Shawn Ullrich [facility], [facility]      ______________________________________________________________________    Integrated Primary Care Behavioral Health Treatment Plan    Patient's Name: Marielos Marti  YOB: 1975    Date of Creation: 5/11/23  Date Treatment Plan Last Reviewed/Revised: 5/11/23    DSM5 Diagnoses: 296.32 (F33.1) Major Depressive Disorder, Recurrent Episode, Moderate _ or 300.02 (F41.1) Generalized Anxiety Disorder  Psychosocial / Contextual Factors: , Partnered, Female, heterosexual; full-time employment;   PROMIS (reviewed every 90 days): pt completed on 5/11/23    Referral / Collaboration:  Referral to another professional/service is not indicated at this time..    Anticipated number of session for this episode of care: 8  Anticipation frequency of session: Every other week  Anticipated Duration of each session: 38-52 minutes  Treatment plan will be reviewed in 90 days or when goals have been changed.       MeasurableTreatment Goal(s) related to diagnosis / functional impairment(s)  Goal 1: Patient will decrease anxiety and improve mood by developing local support network and consistently engaging in physical activity (4x's/week)    I will know I've met my goal when :  \"finding [facility]\".      Objective #A (Patient Action)    Patient will attend and participate in social or recreational activities to build community around artistic/creative acivity.  Status: New - Date: 5/11/23     Intervention(s)  Therapist will assign homework to brainstorm options to develop community and take one action that gets her closer to her goal weekly.      -Less active on my devices    Objective #B (Patient Action)    Status: New - Date: 5/11/23     Patient will exercise for 30 minutes 4 times per week for 30 minutes.    Intervention(s)  Therapist will " teach the client how to complete a 4-part pros and cons. to decrease ambivalence and increase motivation.      Patient has reviewed and agreed to the above plan.      Shawn G. Ullrich, [facility]  May 11, 2023

## 2023-06-27 ENCOUNTER — VIRTUAL VISIT (OUTPATIENT)
Dept: BEHAVIORAL HEALTH | Facility: CLINIC | Age: 48
End: 2023-06-27
Payer: COMMERCIAL

## 2023-06-27 DIAGNOSIS — F41.1 GAD (GENERALIZED ANXIETY DISORDER): Primary | ICD-10-CM

## 2023-07-06 ENCOUNTER — OFFICE VISIT (OUTPATIENT)
Dept: FAMILY MEDICINE | Facility: CLINIC | Age: 48
End: 2023-07-06
Payer: COMMERCIAL

## 2023-07-06 VITALS
TEMPERATURE: 97.8 F | DIASTOLIC BLOOD PRESSURE: 81 MMHG | BODY MASS INDEX: 25.58 KG/M2 | HEIGHT: 62 IN | WEIGHT: 139 LBS | HEART RATE: 74 BPM | OXYGEN SATURATION: 98 % | SYSTOLIC BLOOD PRESSURE: 115 MMHG

## 2023-07-06 DIAGNOSIS — R22.32 MASS OF LEFT AXILLA: ICD-10-CM

## 2023-07-06 DIAGNOSIS — Z01.818 PREOP EXAMINATION: Primary | ICD-10-CM

## 2023-07-06 DIAGNOSIS — B96.89 ACUTE BACTERIAL SINUSITIS: ICD-10-CM

## 2023-07-06 DIAGNOSIS — J01.90 ACUTE BACTERIAL SINUSITIS: ICD-10-CM

## 2023-07-06 LAB
ANION GAP SERPL CALCULATED.3IONS-SCNC: 13 MMOL/L (ref 7–15)
BUN SERPL-MCNC: 9.4 MG/DL (ref 6–20)
CALCIUM SERPL-MCNC: 8.9 MG/DL (ref 8.6–10)
CHLORIDE SERPL-SCNC: 104 MMOL/L (ref 98–107)
CREAT SERPL-MCNC: 0.78 MG/DL (ref 0.51–0.95)
DEPRECATED HCO3 PLAS-SCNC: 24 MMOL/L (ref 22–29)
ERYTHROCYTE [DISTWIDTH] IN BLOOD BY AUTOMATED COUNT: 12.4 % (ref 10–15)
GFR SERPL CREATININE-BSD FRML MDRD: >90 ML/MIN/1.73M2
GLUCOSE SERPL-MCNC: 102 MG/DL (ref 70–99)
HCT VFR BLD AUTO: 40.3 % (ref 35–47)
HGB BLD-MCNC: 13.4 G/DL (ref 11.7–15.7)
MCH RBC QN AUTO: 29.8 PG (ref 26.5–33)
MCHC RBC AUTO-ENTMCNC: 33.3 G/DL (ref 31.5–36.5)
MCV RBC AUTO: 90 FL (ref 78–100)
PLATELET # BLD AUTO: 309 10E3/UL (ref 150–450)
POTASSIUM SERPL-SCNC: 4.1 MMOL/L (ref 3.4–5.3)
RBC # BLD AUTO: 4.49 10E6/UL (ref 3.8–5.2)
SODIUM SERPL-SCNC: 141 MMOL/L (ref 136–145)
WBC # BLD AUTO: 9 10E3/UL (ref 4–11)

## 2023-07-06 PROCEDURE — 82310 ASSAY OF CALCIUM: CPT | Performed by: FAMILY MEDICINE

## 2023-07-06 NOTE — PROGRESS NOTES
[facility] [address], [address]  [facility] [address]  Phone: [phone]  Fax: [phone]  Primary Provider: Geni Bryant  Pre-op Performing Provider: GENI BRYANT    PREOPERATIVE EVALUATION:  Today's date: 7/6/2023    Marielos Marti is a 47 year old female who presents for a preoperative evaluation.    Surgical Information:  Surgery/Procedure: Excision, Mass of Left Axilla  Surgery Location: [facility]  Surgeon: INGRIS Hanna MD  Surgery Date: 08/03/2023  Time of Surgery: 1440  Where patient plans to recover: At home with family  Fax number for surgical facility: Note does not need to be faxed, will be available electronically in Epic.    Assessment & Plan     The proposed surgical procedure is considered INTERMEDIATE risk.    Preop examination  Mass of left axilla  - CBC with platelets  - Basic metabolic panel    Acute bacterial sinusitis  Currently has a sinus infection. Treat with Augmentin for 10 days. I discussed with the patient the risks, benefits, and alternatives to taking this medication as well as common side effects. Worrisome signs and symptoms were discussed with Marielos and she was instructed to return to the clinic for concerning symptoms or to call with questions. Will be fully treated by time of surgery so this should not interfere with surgical clearance.   - amoxicillin-clavulanate (AUGMENTIN) 875-125 MG tablet; Take 1 tablet by mouth 2 times daily for 10 days       - No identified additional risk factors other than previously addressed    Antiplatelet or Anticoagulation Medication Instructions:   - Patient is on no antiplatelet or anticoagulation medications.    Additional Medication Instructions:  Patient is to take all scheduled medications on the day of surgery EXCEPT   - cetirizine and first generation antihistamines: HOLD on day of surgery.    RECOMMENDATION:  APPROVAL  GIVEN to proceed with proposed procedure, without further diagnostic evaluation.    Subjective       HPI related to upcoming procedure: Marielos has a bothersome axillary fibroepithelial lesion that she plans on excision.     She currently has bright green mucus, post-nasal drainage from a sinus infection. Has had 2 weeks of symptoms. Cough at night from post-nasal drainage. Sinus pressure but no headaches. No fever.         7/5/2023    11:42 AM   Preop Questions   1. Have you ever had a heart attack or stroke? No   2. Have you ever had surgery on your heart or blood vessels, such as a stent placement, a coronary artery bypass, or surgery on an artery in your head, neck, heart, or legs? No   3. Do you have chest pain with activity? No   4. Do you have a history of  heart failure? No   5. Do you currently have a cold, bronchitis or symptoms of other infection? YES - currently has a sinus infection (2 weeks of symptoms)   6. Do you have a cough, shortness of breath, or wheezing? YES - cough and post nasal drainage   7. Do you or anyone in your family have previous history of blood clots? No   8. Do you or does anyone in your family have a serious bleeding problem such as prolonged bleeding following surgeries or cuts? No   9. Have you ever had problems with anemia or been told to take iron pills? No   10. Have you had any abnormal blood loss such as black, tarry or bloody stools, or abnormal vaginal bleeding? No   11. Have you ever had a blood transfusion? No   12. Are you willing to have a blood transfusion if it is medically needed before, during, or after your surgery? Yes   13. Have you or any of your relatives ever had problems with anesthesia? UNKNOWN - brother has done fine with anesthesia, she is unsure about any other family members   14. Do you have sleep apnea, excessive snoring or daytime drowsiness? No   15. Do you have any artifical heart valves or other implanted medical devices like a pacemaker,  defibrillator, or continuous glucose monitor? No   16. Do you have artificial joints? No   17. Are you allergic to latex? No   18. Is there any chance that you may be pregnant? No       Health Care Directive:  Patient does not have a Health Care Directive or Living Will: has been discussed    Preoperative Review of :   reviewed - no record of controlled substances prescribed.      Status of Chronic Conditions:  ANXIETY - Patient has a long history of anxiety requiring medication for control with recent symptoms being stable. Seeing a therapist as well which has been helpful.       Review of Systems  CONSTITUTIONAL: NEGATIVE for fever, chills, change in weight  INTEGUMENTARY/SKIN: NEGATIVE for worrisome rashes, moles or lesions  EYES: NEGATIVE for vision changes or irritation  ENT/MOUTH: POSITIVE for sinus infection   RESP: POSITIVE for cough; NEGATIVE for SOB  CV: NEGATIVE for chest pain, palpitations or peripheral edema  GI: NEGATIVE for nausea, abdominal pain, heartburn, or change in bowel habits  : NEGATIVE for frequency, dysuria, or hematuria  NEURO: NEGATIVE for weakness, dizziness or paresthesias  HEME: NEGATIVE for bleeding problems  PSYCHIATRIC: NEGATIVE for changes in mood or affect    Patient Active Problem List    Diagnosis Date Noted     Mass of left axilla 06/02/2023     Priority: Medium     Added automatically from request for surgery [id number]       Generalized anxiety disorder 02/15/2023     Priority: Medium     Herpes simplex 01/19/2023     Priority: Medium     Seasonal allergic rhinitis 01/18/2023     Priority: Medium     Depressive disorder 10/23/2020     Priority: Medium      Past Medical History:   Diagnosis Date     Depression      Herpes simplex virus infection     genital     Seasonal allergic rhinitis      Past Surgical History:   Procedure Laterality Date     BREAST EXCISIONAL BIOPSY Left 09/30/2021    Benign; two biopsies performed.     Current Outpatient Medications   Medication Sig  "Dispense Refill     fluticasone (FLONASE) 50 MCG/ACT nasal spray PLACE 1 SPRAY IN BOTH NOSTRILS TWICE A DAY       loratadine (CLARITIN) 10 MG tablet Take 10 mg by mouth daily       sertraline (ZOLOFT) 100 MG tablet Take 1 tablet (100 mg) by mouth daily 90 tablet 3     SUMAtriptan (IMITREX) 25 MG tablet Take 2 tablets at onset of migraine. May repeat 1 tablet in 2 hours if migraine persists. Max 8 tablets/24 hours. 9 tablet 1     valACYclovir (VALTREX) 500 MG tablet Take 1 tablet (500 mg) by mouth daily 90 tablet 3     VENTOLIN  (90 Base) MCG/ACT inhaler as needed         Allergies   Allergen Reactions     Seasonal Allergies      SH: No tobacco use. Occasional alcohol use. Occasional marijuana edible use.          Objective     /81 (BP Location: Left arm, Patient Position: Sitting, Cuff Size: Adult Regular)   Pulse 74   Temp 97.8  F (36.6  C) (Temporal)   Ht 1.565 m (5' 1.6\")   Wt 63 kg (139 lb)   SpO2 98%   BMI 25.75 kg/m      Physical Exam    GENERAL APPEARANCE: healthy, alert and no distress     EYES: EOMI, PERRL     HENT: ear canals and TM's normal and nose and mouth without ulcers or lesions; nose is congested     NECK: no adenopathy, no asymmetry, masses, or scars and thyroid normal to palpation     RESP: lungs clear to auscultation - no rales, rhonchi or wheezes     CV: regular rates and rhythm, normal S1 S2, no S3 or S4 and no murmur, click or rub     ABDOMEN:  soft, nontender, no HSM or masses      MS: extremities normal- no gross deformities noted, no evidence of inflammation in joints, FROM in all extremities.     SKIN: no suspicious lesions or rashes     NEURO: Normal strength and tone, sensory exam grossly normal, mentation intact and speech normal     PSYCH: mentation appears normal. and affect normal/bright     LYMPHATICS: No cervical adenopathy    Recent Labs   Lab Test 02/15/23  0859      POTASSIUM 4.1   CR 0.79        Diagnostics:  Labs pending at this time.  Results will be " reviewed when available.   No EKG required, no history of coronary heart disease, significant arrhythmia, peripheral arterial disease or other structural heart disease.    Revised Cardiac Risk Index (RCRI):  The patient has the following serious cardiovascular risks for perioperative complications:   - No serious cardiac risks = 0 points     RCRI Interpretation: 0 points: Class I (very low risk - 0.4% complication rate)           Signed Electronically by: Geni Whalen MD  Copy of this evaluation report is provided to requesting physician.

## 2023-07-06 NOTE — NURSING NOTE
"47 year old  Chief Complaint   Patient presents with     Pre-Op Exam     Last physical was in Feb., has a sinus infection today            Blood pressure 115/81, pulse 74, temperature 97.8  F (36.6  C), temperature source Temporal, height 1.565 m (5' 1.6\"), weight 63 kg (139 lb), SpO2 98 %. Body mass index is 25.75 kg/m .  Patient Active Problem List   Diagnosis     Seasonal allergic rhinitis     Depressive disorder     Herpes simplex     Generalized anxiety disorder     Mass of left axilla            Wt Readings from Last 2 Encounters:   07/06/23 63 kg (139 lb)   05/16/23 64.2 kg (141 lb 9.6 oz)     BP Readings from Last 3 Encounters:   07/06/23 115/81   05/16/23 125/84   02/15/23 118/81              Current Outpatient Medications   Medication     fluticasone (FLONASE) 50 MCG/ACT nasal spray     loratadine (CLARITIN) 10 MG tablet     sertraline (ZOLOFT) 100 MG tablet     SUMAtriptan (IMITREX) 25 MG tablet     valACYclovir (VALTREX) 500 MG tablet     VENTOLIN  (90 Base) MCG/ACT inhaler     No current facility-administered medications for this visit.            Social History     Tobacco Use     Smoking status: Never     Smokeless tobacco: Never   Vaping Use     Vaping Use: Never used   Substance Use Topics     Alcohol use: Yes     Comment: Moderate; glass of wine with dinner sometimes. 2-3 in a sitting.     Drug use: Yes     Types: Marijuana     Comment: Edible            Health Maintenance Due   Topic Date Due     DEPRESSION ACTION PLAN  Never done     HEPATITIS B IMMUNIZATION (1 of 3 - 3-dose series) Never done            No results found for: PAP           July 6, 2023 1:59 PM    "

## 2023-07-06 NOTE — H&P (VIEW-ONLY)
[facility] [address], [address]  [facility] [address]  Phone: [phone]  Fax: [phone]  Primary Provider: Geni Bryant  Pre-op Performing Provider: GENI BRYANT    PREOPERATIVE EVALUATION:  Today's date: 7/6/2023    Marielos Marti is a 47 year old female who presents for a preoperative evaluation.    Surgical Information:  Surgery/Procedure: Excision, Mass of Left Axilla  Surgery Location: [facility]  Surgeon: INGRIS Hanna MD  Surgery Date: 08/03/2023  Time of Surgery: 1440  Where patient plans to recover: At home with family  Fax number for surgical facility: Note does not need to be faxed, will be available electronically in Epic.    Assessment & Plan     The proposed surgical procedure is considered INTERMEDIATE risk.    Preop examination  Mass of left axilla  - CBC with platelets  - Basic metabolic panel    Acute bacterial sinusitis  Currently has a sinus infection. Treat with Augmentin for 10 days. I discussed with the patient the risks, benefits, and alternatives to taking this medication as well as common side effects. Worrisome signs and symptoms were discussed with Marielos and she was instructed to return to the clinic for concerning symptoms or to call with questions. Will be fully treated by time of surgery so this should not interfere with surgical clearance.   - amoxicillin-clavulanate (AUGMENTIN) 875-125 MG tablet; Take 1 tablet by mouth 2 times daily for 10 days       - No identified additional risk factors other than previously addressed    Antiplatelet or Anticoagulation Medication Instructions:   - Patient is on no antiplatelet or anticoagulation medications.    Additional Medication Instructions:  Patient is to take all scheduled medications on the day of surgery EXCEPT   - cetirizine and first generation antihistamines: HOLD on day of surgery.    RECOMMENDATION:  APPROVAL  GIVEN to proceed with proposed procedure, without further diagnostic evaluation.    Subjective       HPI related to upcoming procedure: Marielos has a bothersome axillary fibroepithelial lesion that she plans on excision.     She currently has bright green mucus, post-nasal drainage from a sinus infection. Has had 2 weeks of symptoms. Cough at night from post-nasal drainage. Sinus pressure but no headaches. No fever.         7/5/2023    11:42 AM   Preop Questions   1. Have you ever had a heart attack or stroke? No   2. Have you ever had surgery on your heart or blood vessels, such as a stent placement, a coronary artery bypass, or surgery on an artery in your head, neck, heart, or legs? No   3. Do you have chest pain with activity? No   4. Do you have a history of  heart failure? No   5. Do you currently have a cold, bronchitis or symptoms of other infection? YES - currently has a sinus infection (2 weeks of symptoms)   6. Do you have a cough, shortness of breath, or wheezing? YES - cough and post nasal drainage   7. Do you or anyone in your family have previous history of blood clots? No   8. Do you or does anyone in your family have a serious bleeding problem such as prolonged bleeding following surgeries or cuts? No   9. Have you ever had problems with anemia or been told to take iron pills? No   10. Have you had any abnormal blood loss such as black, tarry or bloody stools, or abnormal vaginal bleeding? No   11. Have you ever had a blood transfusion? No   12. Are you willing to have a blood transfusion if it is medically needed before, during, or after your surgery? Yes   13. Have you or any of your relatives ever had problems with anesthesia? UNKNOWN - brother has done fine with anesthesia, she is unsure about any other family members   14. Do you have sleep apnea, excessive snoring or daytime drowsiness? No   15. Do you have any artifical heart valves or other implanted medical devices like a pacemaker,  defibrillator, or continuous glucose monitor? No   16. Do you have artificial joints? No   17. Are you allergic to latex? No   18. Is there any chance that you may be pregnant? No       Health Care Directive:  Patient does not have a Health Care Directive or Living Will: has been discussed    Preoperative Review of :   reviewed - no record of controlled substances prescribed.      Status of Chronic Conditions:  ANXIETY - Patient has a long history of anxiety requiring medication for control with recent symptoms being stable. Seeing a therapist as well which has been helpful.       Review of Systems  CONSTITUTIONAL: NEGATIVE for fever, chills, change in weight  INTEGUMENTARY/SKIN: NEGATIVE for worrisome rashes, moles or lesions  EYES: NEGATIVE for vision changes or irritation  ENT/MOUTH: POSITIVE for sinus infection   RESP: POSITIVE for cough; NEGATIVE for SOB  CV: NEGATIVE for chest pain, palpitations or peripheral edema  GI: NEGATIVE for nausea, abdominal pain, heartburn, or change in bowel habits  : NEGATIVE for frequency, dysuria, or hematuria  NEURO: NEGATIVE for weakness, dizziness or paresthesias  HEME: NEGATIVE for bleeding problems  PSYCHIATRIC: NEGATIVE for changes in mood or affect    Patient Active Problem List    Diagnosis Date Noted     Mass of left axilla 06/02/2023     Priority: Medium     Added automatically from request for surgery [id number]       Generalized anxiety disorder 02/15/2023     Priority: Medium     Herpes simplex 01/19/2023     Priority: Medium     Seasonal allergic rhinitis 01/18/2023     Priority: Medium     Depressive disorder 10/23/2020     Priority: Medium      Past Medical History:   Diagnosis Date     Depression      Herpes simplex virus infection     genital     Seasonal allergic rhinitis      Past Surgical History:   Procedure Laterality Date     BREAST EXCISIONAL BIOPSY Left 09/30/2021    Benign; two biopsies performed.     Current Outpatient Medications   Medication Sig  "Dispense Refill     fluticasone (FLONASE) 50 MCG/ACT nasal spray PLACE 1 SPRAY IN BOTH NOSTRILS TWICE A DAY       loratadine (CLARITIN) 10 MG tablet Take 10 mg by mouth daily       sertraline (ZOLOFT) 100 MG tablet Take 1 tablet (100 mg) by mouth daily 90 tablet 3     SUMAtriptan (IMITREX) 25 MG tablet Take 2 tablets at onset of migraine. May repeat 1 tablet in 2 hours if migraine persists. Max 8 tablets/24 hours. 9 tablet 1     valACYclovir (VALTREX) 500 MG tablet Take 1 tablet (500 mg) by mouth daily 90 tablet 3     VENTOLIN  (90 Base) MCG/ACT inhaler as needed         Allergies   Allergen Reactions     Seasonal Allergies      SH: No tobacco use. Occasional alcohol use. Occasional marijuana edible use.          Objective     /81 (BP Location: Left arm, Patient Position: Sitting, Cuff Size: Adult Regular)   Pulse 74   Temp 97.8  F (36.6  C) (Temporal)   Ht 1.565 m (5' 1.6\")   Wt 63 kg (139 lb)   SpO2 98%   BMI 25.75 kg/m      Physical Exam    GENERAL APPEARANCE: healthy, alert and no distress     EYES: EOMI, PERRL     HENT: ear canals and TM's normal and nose and mouth without ulcers or lesions; nose is congested     NECK: no adenopathy, no asymmetry, masses, or scars and thyroid normal to palpation     RESP: lungs clear to auscultation - no rales, rhonchi or wheezes     CV: regular rates and rhythm, normal S1 S2, no S3 or S4 and no murmur, click or rub     ABDOMEN:  soft, nontender, no HSM or masses      MS: extremities normal- no gross deformities noted, no evidence of inflammation in joints, FROM in all extremities.     SKIN: no suspicious lesions or rashes     NEURO: Normal strength and tone, sensory exam grossly normal, mentation intact and speech normal     PSYCH: mentation appears normal. and affect normal/bright     LYMPHATICS: No cervical adenopathy    Recent Labs   Lab Test 02/15/23  0859      POTASSIUM 4.1   CR 0.79        Diagnostics:  Labs pending at this time.  Results will be " reviewed when available.   No EKG required, no history of coronary heart disease, significant arrhythmia, peripheral arterial disease or other structural heart disease.    Revised Cardiac Risk Index (RCRI):  The patient has the following serious cardiovascular risks for perioperative complications:   - No serious cardiac risks = 0 points     RCRI Interpretation: 0 points: Class I (very low risk - 0.4% complication rate)           Signed Electronically by: Geni Whalen MD  Copy of this evaluation report is provided to requesting physician.

## 2023-07-18 ENCOUNTER — TELEPHONE (OUTPATIENT)
Dept: DERMATOLOGY | Facility: CLINIC | Age: 48
End: 2023-07-18
Payer: COMMERCIAL

## 2023-07-18 NOTE — TELEPHONE ENCOUNTER
Lvm for the patient to call back and reschedule the following:    Appointment type: New  Provider: Cydney Jones  Return date: 12/13/23  Specialty phone number: [phone]

## 2023-07-21 NOTE — PROGRESS NOTES
[facility]  2023    Behavioral Health Clinician Progress Note    Patient Name: Marielos Marti           Service Type:  Individual      Service Location:   Telehealth; see below     Session Start Time: 4:01 pm  Session End Time: 4:59 pm      Session Length: 53 - 60      Attendees: Patient     Service Modality:  Video Visit:      Provider verified identity through the following two step process.  Patient provided:  Patient , Patient address and Patient is known previously to provider    Telemedicine Visit: The patient's condition can be safely assessed and treated via synchronous audio and visual telemedicine encounter.      Reason for Telemedicine Visit: Patient has requested telehealth visit    Originating Site (Patient Location): Patient's home    Distant Site (Provider Location): [facility]    Consent:  The patient/guardian has verbally consented to: the potential risks and benefits of telemedicine (video visit) versus in person care; bill my insurance or make self-payment for services provided; and responsibility for payment of non-covered services.     Patient would like the video invitation sent by:  My Chart    Mode of Communication:  Video Conference via Amwell    Distant Location (Provider):  On-site    As the provider I attest to compliance with applicable laws and regulations related to telemedicine.    Visit Activities (Refresh list every visit): [facility] Only    Diagnostic Assessment Date: 23  Treatment Plan Review Date: 23  CGI Review Date: 23  Promis 10 Review Date:  23     Clinical Global Impressions  First:  Considering your total clinical experience with this particular patient population, how severe are the patient's symptoms at this time?: 4 (2023  5:22 PM)    Most recent:  Compared to the patient's condition at the START of treatment, this patient's condition is: 3 (2023  5:22 PM)      See Flowsheets for today's PHQ-9 and MARY-7  "results  Previous PHQ-9:       6/1/2023     9:44 AM 6/26/2023     4:10 PM 7/24/2023     3:41 PM   PHQ-9 SCORE   PHQ-9 Total Score MyChart 9 (Mild depression) 9 (Mild depression) 12 (Moderate depression)   PHQ-9 Total Score 9 9 12     Previous MARY-7:       6/1/2023     9:45 AM 6/26/2023     4:10 PM 7/24/2023     3:42 PM   MARY-7 SCORE   Total Score 13 (moderate anxiety) 17 (severe anxiety) 17 (severe anxiety)   Total Score 13 17 17       DERIAN LEVEL:       No data to display                DATA  Extended Session (60+ minutes): No  Interactive Complexity: No  Crisis: No  [facility] Patient: No    Treatment Objective(s) Addressed in This Session:  Target Behavior(s):  increase socialization and activity level, and be more present    Depressed Mood: Increase interest, engagement, and pleasure in doing things  Decrease frequency and intensity of feeling down, depressed, hopeless  Feel less tired and more energy during the day   Identify negative self-talk and behaviors: challenge core beliefs, myths, and actions  Anxiety: will experience a reduction in anxiety, will develop more effective coping skills to manage anxiety symptoms, will develop healthy cognitive patterns and beliefs and will increase ability to function adaptively    Current Stressors / Issues:  Marielos reported work trip to [place] was positive, specifically connecting with colleagues, time for personal enjoyment, and getting re-energized about work.  Marielos reported on \"the other side of the coin\" her partner's father's health is worsening, he was recently discharged home from the hospital with hospice services.  Marielos reported her partner is with his father all the time and providing a majority of the support.  Marielos reported she's also spending time with partner's father and she's trying to support her partner as well.  Marielos reported this process has been emotionally, psychologically, and physically exhausting.  [facility] [place] provided support, validated her experience " and assisted with processing these events. Marielos responded by sharing this difficulty has led to several developments, namely, its brought her and partner closer together, its helps them clarify their thoughts about the future, focuses them on what's important, they've found a support system (friends), and they are doing they're best to be their for partner's father.  Marielos reported few self-care coping strategies, but shared she is spending time modifying, riding and thinking about her bike, all of which are enjoyable and good distractions from the constant stress.      Marielos will have surgery next week.       Progress on Treatment Objective(s) / Homework:  No improvement - ACTION (Actively working towards change); Intervened by reinforcing change plan / affirming steps taken    Motivational Interviewing    MI Intervention: Co-Developed Goal: Increase socialization and activity level; be more present, Expressed Empathy/Understanding, Supported Autonomy, Collaboration, Evocation, Open-ended questions, Reflections: simple and complex and Change talk (evoked)     Change Talk Expressed by the Patient: Desire to change Ability to change Reasons to change Need to change    Provider Response to Change Talk: E - Evoked more info from patient about behavior change, A - Affirmed patient's thoughts, decisions, or attempts at behavior change, R - Reflected patient's change talk and S - Summarized patient's change talk statements      Psycho-education regarding mental health diagnoses and treatment options    Psychodynamic psychotherapy  Discuss patient's emotional dynamics and issues and how they impact behaviors  Explore patient's history of relationships and how they impact present behaviors  Explore how to work with and make changes in these schemas and patterns    Interpersonal Psychotherapy  Explore patterns in relationships that are effective or ineffective at helping patient reach their goals, find satisfying  experience.  Discuss new patterns or behaviors to engage in for improved social functioning.      Care Plan review completed: Yes    Medication Review:  No changes to current psychiatric medication(s)    Medication Compliance:  Yes    Changes in Health Issues:   None reported    Chemical Use Review:   Substance Use: Chemical use reviewed, no active concerns identified      Tobacco Use: No current tobacco use.      Assessment: Current Emotional / Mental Status (status of significant symptoms):  Risk status (Self / Other harm or suicidal ideation)  Patient has had a history of suicidal ideation: passive, never intent or plan; no SIB, no SA; most recent passive SI was years ago  Patient denies current fears or concerns for personal safety.  Patient denies current or recent suicidal ideation or behaviors.  Patient denies current or recent homicidal ideation or behaviors.  Patient denies current or recent self injurious behavior or ideation.  Patient denies other safety concerns.  A safety and risk management plan has not been developed at this time, however patient was encouraged to call [place] [phone] should there be a change in any of these risk factors.    Appearance:   Appropriate   Eye Contact:   Good   Psychomotor Behavior: Normal   Attitude:   Cooperative   Orientation:   All  Speech   Rate / Production: Normal    Volume:  Normal   Mood:    Sad  Anxious  Affect:    Worrisome   Thought Content:  Clear   Thought Form:  Coherent  Logical   Insight:    Good     Diagnoses:  1. Generalized anxiety disorder         Collateral Reports Completed:  Routed note to PCP    Plan: (Homework, other):  Patient was given information about behavioral services and encouraged to schedule a follow up appointment with the clinic [facility] in 3 weeks.  She was also given information about mental health symptoms and treatment options .  CD Recommendations: No indications of CD issues.   Shawn Ullrich [facility],  "LADC      ______________________________________________________________________    Integrated Primary Care Behavioral Health Treatment Plan    Patient's Name: Marielos Marti  YOB: 1975    Date of Creation: 5/11/23  Date Treatment Plan Last Reviewed/Revised: 5/11/23    DSM5 Diagnoses: 296.32 (F33.1) Major Depressive Disorder, Recurrent Episode, Moderate _ or 300.02 (F41.1) Generalized Anxiety Disorder  Psychosocial / Contextual Factors: , Partnered, Female, heterosexual; full-time employment;   PROMIS (reviewed every 90 days): pt completed on 5/11/23    Referral / Collaboration:  Referral to another professional/service is not indicated at this time..    Anticipated number of session for this episode of care: 8  Anticipation frequency of session: Every other week  Anticipated Duration of each session: 38-52 minutes  Treatment plan will be reviewed in 90 days or when goals have been changed.       MeasurableTreatment Goal(s) related to diagnosis / functional impairment(s)  Goal 1: Patient will decrease anxiety and improve mood by developing local support network and consistently engaging in physical activity (4x's/week)    I will know I've met my goal when :  \"finding my community in the [facility]\".      Objective #A (Patient Action)    Patient will attend and participate in social or recreational activities to build community around artistic/creative acivity .  Status: New - Date: 5/11/23      Intervention(s)  Therapist will assign homework to brainstorm options to develop community and take one action that gets her closer to her goal weekly.      -Less active on my devices    Objective #B (Patient Action)    Status: New - Date: 5/11/23      Patient will exercise for 30 minutes 4 times per week for 30 minutes.    Intervention(s)  Therapist will teach the client how to complete a 4-part pros and cons. to decrease ambivalence and increase motivation .      Patient has reviewed and agreed " to the above plan.      Shawn G. Ullrich, [facility]  May 11, 2023

## 2023-07-24 ASSESSMENT — ANXIETY QUESTIONNAIRES
3. WORRYING TOO MUCH ABOUT DIFFERENT THINGS: NEARLY EVERY DAY
5. BEING SO RESTLESS THAT IT IS HARD TO SIT STILL: SEVERAL DAYS
1. FEELING NERVOUS, ANXIOUS, OR ON EDGE: NEARLY EVERY DAY
2. NOT BEING ABLE TO STOP OR CONTROL WORRYING: NEARLY EVERY DAY
IF YOU CHECKED OFF ANY PROBLEMS ON THIS QUESTIONNAIRE, HOW DIFFICULT HAVE THESE PROBLEMS MADE IT FOR YOU TO DO YOUR WORK, TAKE CARE OF THINGS AT HOME, OR GET ALONG WITH OTHER PEOPLE: VERY DIFFICULT
6. BECOMING EASILY ANNOYED OR IRRITABLE: SEVERAL DAYS
7. FEELING AFRAID AS IF SOMETHING AWFUL MIGHT HAPPEN: NEARLY EVERY DAY
GAD7 TOTAL SCORE: 17
4. TROUBLE RELAXING: NEARLY EVERY DAY
GAD7 TOTAL SCORE: 17

## 2023-07-24 ASSESSMENT — PATIENT HEALTH QUESTIONNAIRE - PHQ9
10. IF YOU CHECKED OFF ANY PROBLEMS, HOW DIFFICULT HAVE THESE PROBLEMS MADE IT FOR YOU TO DO YOUR WORK, TAKE CARE OF THINGS AT HOME, OR GET ALONG WITH OTHER PEOPLE: SOMEWHAT DIFFICULT
SUM OF ALL RESPONSES TO PHQ QUESTIONS 1-9: 12
SUM OF ALL RESPONSES TO PHQ QUESTIONS 1-9: 12

## 2023-07-25 ENCOUNTER — VIRTUAL VISIT (OUTPATIENT)
Dept: BEHAVIORAL HEALTH | Facility: CLINIC | Age: 48
End: 2023-07-25
Payer: COMMERCIAL

## 2023-07-25 DIAGNOSIS — F41.1 GENERALIZED ANXIETY DISORDER: Primary | ICD-10-CM

## 2023-07-26 ENCOUNTER — OFFICE VISIT (OUTPATIENT)
Dept: PLASTIC SURGERY | Facility: CLINIC | Age: 48
End: 2023-07-26
Payer: COMMERCIAL

## 2023-07-26 VITALS
HEART RATE: 69 BPM | HEIGHT: 62 IN | DIASTOLIC BLOOD PRESSURE: 69 MMHG | OXYGEN SATURATION: 97 % | WEIGHT: 139.4 LBS | BODY MASS INDEX: 25.65 KG/M2 | SYSTOLIC BLOOD PRESSURE: 111 MMHG

## 2023-07-26 DIAGNOSIS — R22.32 MASS OF LEFT AXILLA: Primary | ICD-10-CM

## 2023-07-26 PROCEDURE — 99214 OFFICE O/P EST MOD 30 MIN: CPT | Performed by: PLASTIC SURGERY

## 2023-07-26 ASSESSMENT — PAIN SCALES - GENERAL: PAINLEVEL: NO PAIN (0)

## 2023-07-26 NOTE — NURSING NOTE
Pre Op Teaching Note:       Pre and Post op Patient Education    Relevant Diagnosis:  axillary mass  Surgical procedure:  excision axillary mass    Patient demonstrates understanding of the following:  Date of surgery:  8/3/23  Location of surgery:  [facility] - 5th Floor  History and Physical and any other testing necessary prior to surgery: Yes, discussed with pt need for pre-op within 30 days of surgery with PCP.    Patient demonstrates understanding of the following:    Pre-op showering/scrub information with PCMX Soap: Yes, soap given in clinic today  Blood thinner medications discussed and when to stop (if applicable):  Per PCP at pre-op.     Post-op follow-up:  Discussed how to contact the hospital, nurse, and clinic scheduling staff if necessary. (See packet information)    Instructional materials used/given/mailed:  [place] [facility] Packet per surgery scheduler, post op teaching sheet, Soap.  Pt advised that final arrival information to come closer to the surgery date by [facility] nurses.

## 2023-07-26 NOTE — LETTER
7/26/2023       RE: Marielos Marti  [address]  [facility] [address]     Dear Colleague,    Thank you for referring your patient, Marielos Marti, to the [facility]ERY [facility]INIC [place] at [facility]. Please see a copy of my visit note below.    PREOPERATIVE VISIT    PRESENTING COMPLAINT:  Preoperative visit for upcoming left axillary mass excision on 08/03/2023.    HISTORY OF PRESENTING COMPLAINT:  Ms. Marti is 47 years old, here for preoperative visit.  No change in history and physical exam.    ASSESSMENT AND PLAN:  Based on the above findings, a diagnosis of preoperative visit for upcoming left axillary mass excision was made. In the presence of my nurse, Shayna Parikh, I went over the entire procedure and expectations and risks, benefits and alternatives with her.  She understood them.  All questions were answered.  These were all dictated in previous consult note.  She was happy with the visit.  I look forward to helping her out in the near future.    Total time spent in the encounter today, including chart review, visit itself and post-visit paperwork, was 30 minutes.        Again, thank you for allowing me to participate in the care of your patient.      Sincerely,    INGRIS Hanna MD

## 2023-07-26 NOTE — NURSING NOTE
"Chief Complaint   Patient presents with    WILLA Pederseny is being seen today for a pre-op consult, DOS 8/3/23.       Vitals:    07/26/23 1007   BP: 111/69   BP Location: Left arm   Patient Position: Sitting   Cuff Size: Adult Regular   Pulse: 69   SpO2: 97%   Weight: 63.2 kg (139 lb 6.4 oz)   Height: 1.565 m (5' 1.6\")       Body mass index is 25.83 kg/m .    Vincent Huang, EMT    "

## 2023-07-27 NOTE — PROGRESS NOTES
PREOPERATIVE VISIT    PRESENTING COMPLAINT:  Preoperative visit for upcoming left axillary mass excision on 08/03/2023.    HISTORY OF PRESENTING COMPLAINT:  Ms. Marti is 47 years old, here for preoperative visit.  No change in history and physical exam.    ASSESSMENT AND PLAN:  Based on the above findings, a diagnosis of preoperative visit for upcoming left axillary mass excision was made. In the presence of my nurse, Shayna Parikh, I went over the entire procedure and expectations and risks, benefits and alternatives with her.  She understood them.  All questions were answered.  These were all dictated in previous consult note.  She was happy with the visit.  I look forward to helping her out in the near future.    Total time spent in the encounter today, including chart review, visit itself and post-visit paperwork, was 30 minutes.

## 2023-08-02 ENCOUNTER — ANESTHESIA EVENT (OUTPATIENT)
Dept: SURGERY | Facility: AMBULATORY SURGERY CENTER | Age: 48
End: 2023-08-02
Payer: COMMERCIAL

## 2023-08-02 RX ORDER — OXYCODONE HYDROCHLORIDE 5 MG/1
10 TABLET ORAL
Status: CANCELLED | OUTPATIENT
Start: 2023-08-02

## 2023-08-02 RX ORDER — ONDANSETRON 4 MG/1
4 TABLET, ORALLY DISINTEGRATING ORAL EVERY 30 MIN PRN
Status: CANCELLED | OUTPATIENT
Start: 2023-08-02

## 2023-08-02 RX ORDER — ONDANSETRON 2 MG/ML
4 INJECTION INTRAMUSCULAR; INTRAVENOUS EVERY 30 MIN PRN
Status: CANCELLED | OUTPATIENT
Start: 2023-08-02

## 2023-08-02 RX ORDER — OXYCODONE HYDROCHLORIDE 5 MG/1
5 TABLET ORAL
Status: CANCELLED | OUTPATIENT
Start: 2023-08-02

## 2023-08-02 NOTE — ANESTHESIA PREPROCEDURE EVALUATION
Anesthesia Pre-Procedure Evaluation    Patient: Marielos Marti   MRN: [id number] : 1975        Procedure : Procedure(s):  EXCISION, MASS, AXILLARY, LEFT          Past Medical History:   Diagnosis Date    Depression     Herpes simplex virus infection     genital    Seasonal allergic rhinitis       Past Surgical History:   Procedure Laterality Date    BREAST EXCISIONAL BIOPSY Left 2021    Benign; two biopsies performed.      Allergies   Allergen Reactions    Seasonal Allergies       Social History     Tobacco Use    Smoking status: Never    Smokeless tobacco: Never   Substance Use Topics    Alcohol use: Yes     Comment: Moderate; glass of wine with dinner sometimes. 2-3 in a sitting.      Wt Readings from Last 1 Encounters:   23 63.2 kg (139 lb 6.4 oz)           Physical Exam    Airway        Mallampati: III   TM distance: > 3 FB   Neck ROM: full   Mouth opening: > 3 cm    Respiratory Devices and Support         Dental       (+) Minor Abnormalities - some fillings, tiny chips      Cardiovascular   cardiovascular exam normal          Pulmonary   pulmonary exam normal                OUTSIDE LABS:  CBC:   Lab Results   Component Value Date    WBC 9.0 2023    HGB 13.4 2023    HCT 40.3 2023     2023     BMP:   Lab Results   Component Value Date     2023     02/15/2023    POTASSIUM 4.1 2023    POTASSIUM 4.1 02/15/2023    CHLORIDE 104 2023    CHLORIDE 107 02/15/2023    CO2 24 2023    CO2 25 02/15/2023    BUN 9.4 2023    BUN 12.0 02/15/2023    CR 0.78 2023    CR 0.79 02/15/2023     (H) 2023    GLC 81 02/15/2023     COAGS: No results found for: PTT, INR, FIBR  POC: No results found for: BGM, HCG, HCGS  HEPATIC: No results found for: ALBUMIN, PROTTOTAL, ALT, AST, GGT, ALKPHOS, BILITOTAL, BILIDIRECT, PAUL  OTHER:   Lab Results   Component Value Date    DONNA 8.9 2023    TSH 3.15 02/15/2023       Anesthesia  Plan    ASA Status:  2    NPO Status:  NPO Appropriate    Anesthesia Type: General.     - Airway: LMA   Induction: Intravenous, Propofol.   Maintenance: Balanced.        Consents    Anesthesia Plan(s) and associated risks, benefits, and realistic alternatives discussed. Questions answered and patient/representative(s) expressed understanding.     - Discussed:     - Discussed with:  Patient      - Extended Intubation/Ventilatory Support Discussed: No.      - Patient is DNR/DNI Status: No     Use of blood products discussed: No .     Postoperative Care    Pain management: IV analgesics, Oral pain medications, Multi-modal analgesia.   PONV prophylaxis: Dexamethasone or Solumedrol, Ondansetron (or other 5HT-3), Background Propofol Infusion     Comments:                Temo Oliver MD

## 2023-08-03 ENCOUNTER — HOSPITAL ENCOUNTER (OUTPATIENT)
Facility: AMBULATORY SURGERY CENTER | Age: 48
Discharge: HOME | End: 2023-08-03
Attending: PLASTIC SURGERY
Payer: COMMERCIAL

## 2023-08-03 ENCOUNTER — ANESTHESIA (OUTPATIENT)
Dept: SURGERY | Facility: AMBULATORY SURGERY CENTER | Age: 48
End: 2023-08-03
Payer: COMMERCIAL

## 2023-08-03 VITALS
DIASTOLIC BLOOD PRESSURE: 70 MMHG | RESPIRATION RATE: 16 BRPM | HEIGHT: 62 IN | HEART RATE: 97 BPM | SYSTOLIC BLOOD PRESSURE: 110 MMHG | TEMPERATURE: 97.3 F | OXYGEN SATURATION: 98 % | BODY MASS INDEX: 25.58 KG/M2 | WEIGHT: 139 LBS

## 2023-08-03 DIAGNOSIS — R22.32 MASS OF LEFT AXILLA: ICD-10-CM

## 2023-08-03 LAB
HCG UR QL: NEGATIVE
INTERNAL QC OK POCT: NORMAL
POCT KIT EXPIRATION DATE: NORMAL
POCT KIT LOT NUMBER: NORMAL

## 2023-08-03 PROCEDURE — 81025 URINE PREGNANCY TEST: CPT | Performed by: PATHOLOGY

## 2023-08-03 PROCEDURE — 19120 REMOVAL OF BREAST LESION: CPT | Mod: LT

## 2023-08-03 PROCEDURE — 88307 TISSUE EXAM BY PATHOLOGIST: CPT | Mod: 26 | Performed by: PATHOLOGY

## 2023-08-03 PROCEDURE — 88307 TISSUE EXAM BY PATHOLOGIST: CPT | Mod: TC | Performed by: PLASTIC SURGERY

## 2023-08-03 RX ORDER — LIDOCAINE HYDROCHLORIDE 20 MG/ML
INJECTION, SOLUTION INFILTRATION; PERINEURAL PRN
Status: DISCONTINUED | OUTPATIENT
Start: 2023-08-03 | End: 2023-08-03

## 2023-08-03 RX ORDER — ONDANSETRON 4 MG/1
4 TABLET, ORALLY DISINTEGRATING ORAL EVERY 30 MIN PRN
Status: DISCONTINUED | OUTPATIENT
Start: 2023-08-03 | End: 2023-08-04 | Stop reason: HOSPADM

## 2023-08-03 RX ORDER — FENTANYL CITRATE 50 UG/ML
50 INJECTION, SOLUTION INTRAMUSCULAR; INTRAVENOUS EVERY 5 MIN PRN
Status: DISCONTINUED | OUTPATIENT
Start: 2023-08-03 | End: 2023-08-04 | Stop reason: HOSPADM

## 2023-08-03 RX ORDER — HYDROMORPHONE HYDROCHLORIDE 1 MG/ML
0.4 INJECTION, SOLUTION INTRAMUSCULAR; INTRAVENOUS; SUBCUTANEOUS EVERY 5 MIN PRN
Status: DISCONTINUED | OUTPATIENT
Start: 2023-08-03 | End: 2023-08-04 | Stop reason: HOSPADM

## 2023-08-03 RX ORDER — CEFAZOLIN SODIUM 2 G/50ML
2 SOLUTION INTRAVENOUS SEE ADMIN INSTRUCTIONS
Status: DISCONTINUED | OUTPATIENT
Start: 2023-08-03 | End: 2023-08-03 | Stop reason: HOSPADM

## 2023-08-03 RX ORDER — FLUMAZENIL 0.1 MG/ML
0.2 INJECTION, SOLUTION INTRAVENOUS
Status: DISCONTINUED | OUTPATIENT
Start: 2023-08-03 | End: 2023-08-03 | Stop reason: HOSPADM

## 2023-08-03 RX ORDER — SODIUM CHLORIDE, SODIUM LACTATE, POTASSIUM CHLORIDE, CALCIUM CHLORIDE 600; 310; 30; 20 MG/100ML; MG/100ML; MG/100ML; MG/100ML
INJECTION, SOLUTION INTRAVENOUS CONTINUOUS
Status: DISCONTINUED | OUTPATIENT
Start: 2023-08-03 | End: 2023-08-03 | Stop reason: HOSPADM

## 2023-08-03 RX ORDER — ACETAMINOPHEN 325 MG/1
975 TABLET ORAL ONCE
Status: COMPLETED | OUTPATIENT
Start: 2023-08-03 | End: 2023-08-03

## 2023-08-03 RX ORDER — LIDOCAINE 40 MG/G
CREAM TOPICAL
Status: DISCONTINUED | OUTPATIENT
Start: 2023-08-03 | End: 2023-08-03 | Stop reason: HOSPADM

## 2023-08-03 RX ORDER — FENTANYL CITRATE 50 UG/ML
25-50 INJECTION, SOLUTION INTRAMUSCULAR; INTRAVENOUS
Status: DISCONTINUED | OUTPATIENT
Start: 2023-08-03 | End: 2023-08-03 | Stop reason: HOSPADM

## 2023-08-03 RX ORDER — PROPOFOL 10 MG/ML
INJECTION, EMULSION INTRAVENOUS PRN
Status: DISCONTINUED | OUTPATIENT
Start: 2023-08-03 | End: 2023-08-03

## 2023-08-03 RX ORDER — ONDANSETRON 2 MG/ML
INJECTION INTRAMUSCULAR; INTRAVENOUS PRN
Status: DISCONTINUED | OUTPATIENT
Start: 2023-08-03 | End: 2023-08-03

## 2023-08-03 RX ORDER — ONDANSETRON 2 MG/ML
4 INJECTION INTRAMUSCULAR; INTRAVENOUS EVERY 30 MIN PRN
Status: DISCONTINUED | OUTPATIENT
Start: 2023-08-03 | End: 2023-08-04 | Stop reason: HOSPADM

## 2023-08-03 RX ORDER — NALOXONE HYDROCHLORIDE 0.4 MG/ML
0.2 INJECTION, SOLUTION INTRAMUSCULAR; INTRAVENOUS; SUBCUTANEOUS
Status: DISCONTINUED | OUTPATIENT
Start: 2023-08-03 | End: 2023-08-03 | Stop reason: HOSPADM

## 2023-08-03 RX ORDER — CEFAZOLIN SODIUM 2 G/50ML
2 SOLUTION INTRAVENOUS
Status: COMPLETED | OUTPATIENT
Start: 2023-08-03 | End: 2023-08-03

## 2023-08-03 RX ORDER — SODIUM CHLORIDE, SODIUM LACTATE, POTASSIUM CHLORIDE, CALCIUM CHLORIDE 600; 310; 30; 20 MG/100ML; MG/100ML; MG/100ML; MG/100ML
INJECTION, SOLUTION INTRAVENOUS CONTINUOUS
Status: DISCONTINUED | OUTPATIENT
Start: 2023-08-03 | End: 2023-08-04 | Stop reason: HOSPADM

## 2023-08-03 RX ORDER — PROPOFOL 10 MG/ML
INJECTION, EMULSION INTRAVENOUS CONTINUOUS PRN
Status: DISCONTINUED | OUTPATIENT
Start: 2023-08-03 | End: 2023-08-03

## 2023-08-03 RX ORDER — FENTANYL CITRATE 50 UG/ML
INJECTION, SOLUTION INTRAMUSCULAR; INTRAVENOUS PRN
Status: DISCONTINUED | OUTPATIENT
Start: 2023-08-03 | End: 2023-08-03

## 2023-08-03 RX ORDER — FENTANYL CITRATE 50 UG/ML
25 INJECTION, SOLUTION INTRAMUSCULAR; INTRAVENOUS EVERY 5 MIN PRN
Status: DISCONTINUED | OUTPATIENT
Start: 2023-08-03 | End: 2023-08-04 | Stop reason: HOSPADM

## 2023-08-03 RX ORDER — NALOXONE HYDROCHLORIDE 0.4 MG/ML
0.4 INJECTION, SOLUTION INTRAMUSCULAR; INTRAVENOUS; SUBCUTANEOUS
Status: DISCONTINUED | OUTPATIENT
Start: 2023-08-03 | End: 2023-08-03 | Stop reason: HOSPADM

## 2023-08-03 RX ORDER — LIDOCAINE HYDROCHLORIDE 10 MG/ML
INJECTION, SOLUTION EPIDURAL; INFILTRATION; INTRACAUDAL; PERINEURAL PRN
Status: DISCONTINUED | OUTPATIENT
Start: 2023-08-03 | End: 2023-08-03 | Stop reason: HOSPADM

## 2023-08-03 RX ORDER — DEXAMETHASONE SODIUM PHOSPHATE 4 MG/ML
INJECTION, SOLUTION INTRA-ARTICULAR; INTRALESIONAL; INTRAMUSCULAR; INTRAVENOUS; SOFT TISSUE PRN
Status: DISCONTINUED | OUTPATIENT
Start: 2023-08-03 | End: 2023-08-03

## 2023-08-03 RX ORDER — HYDROMORPHONE HYDROCHLORIDE 1 MG/ML
0.2 INJECTION, SOLUTION INTRAMUSCULAR; INTRAVENOUS; SUBCUTANEOUS EVERY 5 MIN PRN
Status: DISCONTINUED | OUTPATIENT
Start: 2023-08-03 | End: 2023-08-04 | Stop reason: HOSPADM

## 2023-08-03 RX ADMIN — ACETAMINOPHEN 975 MG: 325 TABLET ORAL at 13:43

## 2023-08-03 RX ADMIN — FENTANYL CITRATE 50 MCG: 50 INJECTION, SOLUTION INTRAMUSCULAR; INTRAVENOUS at 15:29

## 2023-08-03 RX ADMIN — PROPOFOL 200 MG: 10 INJECTION, EMULSION INTRAVENOUS at 15:29

## 2023-08-03 RX ADMIN — SODIUM CHLORIDE, SODIUM LACTATE, POTASSIUM CHLORIDE, CALCIUM CHLORIDE: 600; 310; 30; 20 INJECTION, SOLUTION INTRAVENOUS at 13:35

## 2023-08-03 RX ADMIN — PROPOFOL 150 MCG/KG/MIN: 10 INJECTION, EMULSION INTRAVENOUS at 15:29

## 2023-08-03 RX ADMIN — ONDANSETRON 4 MG: 2 INJECTION INTRAMUSCULAR; INTRAVENOUS at 15:29

## 2023-08-03 RX ADMIN — LIDOCAINE HYDROCHLORIDE 100 MG: 20 INJECTION, SOLUTION INFILTRATION; PERINEURAL at 15:29

## 2023-08-03 RX ADMIN — CEFAZOLIN SODIUM 2 G: 2 SOLUTION INTRAVENOUS at 15:20

## 2023-08-03 RX ADMIN — FENTANYL CITRATE 50 MCG: 50 INJECTION, SOLUTION INTRAMUSCULAR; INTRAVENOUS at 15:33

## 2023-08-03 RX ADMIN — DEXAMETHASONE SODIUM PHOSPHATE 4 MG: 4 INJECTION, SOLUTION INTRA-ARTICULAR; INTRALESIONAL; INTRAMUSCULAR; INTRAVENOUS; SOFT TISSUE at 15:29

## 2023-08-03 NOTE — ANESTHESIA POSTPROCEDURE EVALUATION
Patient: Marielos Marti    Procedure: Procedure(s):  EXCISION, MASS, AXILLARY, LEFT       Anesthesia Type:  General    Note:  Disposition: Outpatient   Postop Pain Control: Uneventful            Sign Out: Well controlled pain   PONV: No   Neuro/Psych: Uneventful            Sign Out: Acceptable/Baseline neuro status   Airway/Respiratory: Uneventful            Sign Out: Acceptable/Baseline resp. status   CV/Hemodynamics: Uneventful            Sign Out: Acceptable CV status; No obvious hypovolemia; No obvious fluid overload   Other NRE:    DID A NON-ROUTINE EVENT OCCUR?     Event details/Postop Comments:               Last vitals:  Vitals Value Taken Time   /80 08/03/23 1640   Temp     Pulse 63 08/03/23 1640   Resp 12 08/03/23 1640   SpO2 100 % 08/03/23 1640   Vitals shown include unvalidated device data.    Electronically Signed By: Chary Vargas MD  August 3, 2023  5:27 PM

## 2023-08-03 NOTE — BRIEF OP NOTE
[facility]    Brief Operative Note    Pre-operative diagnosis: Mass of left axilla [R22.32]  Post-operative diagnosis Same as pre-operative diagnosis    Procedure: Procedure(s):  EXCISION, MASS, AXILLARY, LEFT  Surgeon: Surgeon(s) and Role:     * INGRIS Hanna MD - Primary  Anesthesia: General   Estimated Blood Loss: 2mL    Drains: None  Specimens:   ID Type Source Tests Collected by Time Destination   1 : Left axillary mass Tissue Other SURGICAL PATHOLOGY EXAM INGRIS Hanna MD 8/3/2023  3:57 PM      Findings:  Left, fibrous, fatty, and firm mass of left axilla,   Complications: None.  Implants: * No implants in log *    [facility]   Plastic & Reconstructive Surgery

## 2023-08-03 NOTE — DISCHARGE INSTRUCTIONS
SCAR REVISION OR EXCISION OF LESIONS POST-OPERATIVE INSTRUCTIONS    Instructions      Have someone drive you home after surgery and help you at home for 1-2      days.     Get plenty of rest.     Follow balanced diet.     Decreased activity may promote constipation, so you may want to add      more raw fruit to your diet, and be sure to increase fluid intake.     Take pain medication as prescribed. Do not take aspirin or any products      containing aspirin.     Do not drink alcohol when taking pain medications.     Even when not taking pain medications, no alcohol for 3 weeks as it      causes fluid retention.     If you are taking vitamins with iron, resume these as tolerated.     Do not smoke, as smoking delays healing and increases the risk of      complications.    Activities     Do not drive until you are no longer taking any pain medications      (narcotics).     Start walking as soon as possible, this helps to reduce swelling and       lowers the chance of blood clots.     Resume normal activities gradually.     Return to work in 1-2 days, depending upon extent of surgery     No strenuous work or stress on wound for 2-3 weeks.    Incision Care     The incision is covered with tape and glue, is waterproof. Nothing needs to be done to the incision except to take off the tape in 2 weeks if still present. ACE wrap (if used) for support and use for a few days. May shower daily starting tomorrow.      Avoid exposing scars to sun for at least 12 months.     Always use a strong sunblock, if sun exposure is unavoidable (SPF 50 or      greater).     Inspect daily for signs of infection.     No tub soaking, bathing, or swimming while sutures or drains are in place.     Keep area clean and dry for first 24 hours.     What to Expect     Some swelling and bruising.     May have slight bleeding from incision.  Apply 4 x 4 gauze with slight pressure to       control bleeding.     Skin grafts and flaps may take several weeks  or months to heal; a support garment or      bandage may be necessary for up to a year.    Appearance     Final results of surgery may take a year or more.    Follow-Up Care     If external sutures have been used, they will be removed in 5-14 days.    Please note my office will call you 1-2 business days after your procedure to check up on how you're doing and to schedule your post-operative appointment.        When to Call     If you have increased swelling or bruising.     If swelling and redness persist after a few days.     If you have increased redness along the incision.     If you have severe or increased pain not relieved by medication.     If you have any side effects to medications; such as, rash, nausea,      headache, vomiting.     If you have an oral temperature over 100.4 degrees.     If you have any yellowish or greenish drainage from the incisions or      notice a foul odor.     If you have bleeding from the incisions that is difficult to control with      light pressure.     If you have loss of feeling or motion.    For Medical Questions, Please Call:     [phone], Monday - Friday, 8 a.m. - 4:30 p.m.     After hours and on weekends, call Hospital Paging at [phone] and      ask for the Plastic Surgeon on call.    [facility]  Home Care Following Anesthesia  For 24 hours after surgery:  Get plenty of rest.  A responsible adult must stay with you for at least 24 hours after you leave the surgery center.  Do not drive or use heavy equipment.  If you have weakness or tingling, don't drive or use heavy equipment until this feeling goes away.   Do not drink alcohol.   Avoid strenuous or risky activities.  Ask for help when climbing stairs.  You may feel lightheaded.  IF so, sit for a few minutes before standing.  Have someone help you get up.   If you have nausea (feel sick to your stomach): Drink only clear liquids such as apple juice, ginger ale, broth or  "7-Up.  Rest may also help.  Be sure to drink enough fluids.  Move to a regular diet as you feel able.   You may have a slight fever.  Call the doctor if your fever is over 100 F (37.7 C) (taken under the tongue) or lasts longer than 24 hours.  You may have a dry mouth, a sore throat, muscle aches or trouble sleeping. These should go away after 24 hours.  Do not make important or legal decisions.   It is recommended to avoid smoking.   If you use hormonal birth control (such as the pill, patch, ring or implants):  You will need a second form of birth control for 7 days (condoms, a diaphragm or contraceptive foam).  While in the surgery center, you received a medicine called Sugammadex.  Hormonal birth control (such as the pill, patch, ring or implants) will not work as well for a week after taking this medicine.  Today you received a Marcaine or bupivacaine block to numb the nerves near your surgery site.  This is a block using local anesthetic or \"numbing\" medication injected around the nerves to anesthetize or \"numb\" the area supplied by those nerves.  This block is injected into the muscle layer near your surgical site.  The medication may numb the location where you had surgery for 6-18 hours, but may last up to 24 hours.  If your surgical site is an arm or leg you should be careful with your affected limb, since it is possible to injure your limb without being aware of it due to the numbing.  Until full feeling returns, you should guard against bumping or hitting your limb, and avoid extreme hot or cold temperatures on the skin.  As the block wears off, the feeling will return as a tingling or prickly sensation near your surgical site.  You will experience more discomfort from your incision as the feeling returns.  You may want to take a pain pill (a narcotic or Tylenol if this was prescribed by your surgeon) when you start to experience mild pain before the pain beccomes more severe.  If your pain medications " do not control your pain you should notifiy your surgeon.    Tips for taking pain medications  To get the best pain relief possible, remember these points:  Take pain medications as directed, before pain becomes severe.  Pain medication can upset your stomach: taking it with food may help.  Constipation is a common side effect of pain medication. Drink plenty of  fluids.  Eat foods high in fiber. Take a stool softener if recommended by your doctor or pharmacist.  Do not drink alcohol, drive or operate machinery while taking pain medications.  Ask about other ways to control pain, such as with heat, ice or relaxation.    Tylenol/Acetaminophen Consumption    If you feel your pain relief is insufficient, you may take Tylenol/Acetaminophen in addition to your narcotic pain medication.   Be careful not to exceed 4,000 mg of Tylenol/Acetaminophen in a 24 hour period from all sources.  If you are taking extra strength Tylenol/acetaminophen (500 mg), the maximum dose is 8 tablets in 24 hours.  If you are taking regular strength acetaminophen (325 mg), the maximum dose is 12 tablets in 24 hours.    Call a doctor for any of the following:  Signs of infection (fever, growing tenderness at the surgery site, a large amount of drainage or bleeding, severe pain, foul-smelling drainage, redness, swelling).  It has been over 8 to 10 hours since surgery and you are still not able to urinate (pass water).  Headache for over 24 hours.  Numbness, tingling or weakness the day after surgery (if you had spinal anesthesia).  Signs of Covid-19 infection (temperature over 100 degrees, shortness of breath, cough, loss of taste/smell, generalized body aches, persistent headache, chills, sore throat, nausea/vomiting/diarrhea)  Your doctor is:  Dr. Sarah Hanna, Plastic Surgery: [phone]  Or dial [phone] and ask for the resident on call for:  Plastics  For emergency care, call the:  [facility]:  [phone] (TTY for  hearing impaired: [phone])  Tylenol 975 mg given at 145 pm.  Next available dose at 745 pm.

## 2023-08-03 NOTE — INTERVAL H&P NOTE
"I have reviewed the surgical (or preoperative) H&P that is linked to this encounter, and examined the patient. There are no significant changes    Clinical Conditions Present on Arrival:  Clinically Significant Risk Factors Present on Admission                  # Overweight: Estimated body mass index is 25.84 kg/m  as calculated from the following:    Height as of this encounter: 1.562 m (5' 1.5\").    Weight as of this encounter: 63 kg (139 lb).       "

## 2023-08-03 NOTE — ANESTHESIA CARE TRANSFER NOTE
Patient: Marielos Marti    Procedure: Procedure(s):  EXCISION, MASS, AXILLARY, LEFT       Diagnosis: Mass of left axilla [R22.32]  Diagnosis Additional Information: No value filed.    Anesthesia Type:   General     Note:    Oropharynx: oropharynx clear of all foreign objects  Level of Consciousness: awake  Oxygen Supplementation: nasal cannula  Level of Supplemental Oxygen (L/min / FiO2): 3  Independent Airway: airway patency satisfactory and stable    Vital Signs Stable: post-procedure vital signs reviewed and stable  Report to RN Given: handoff report given  Patient transferred to: PACU    Handoff Report: Identifed the Patient, Identified the Reponsible Provider, Reviewed the pertinent medical history, Discussed the surgical course, Reviewed Intra-OP anesthesia mangement and issues during anesthesia, Set expectations for post-procedure period and Allowed opportunity for questions and acknowledgement of understanding      Vitals:  Vitals Value Taken Time   /73 08/03/23 1612   Temp     Pulse 69 08/03/23 1613   Resp 17 08/03/23 1613   SpO2 100 % 08/03/23 1613   Vitals shown include unvalidated device data.    Electronically Signed By: MARIE Eastman CRNA  August 3, 2023  4:14 PM

## 2023-08-04 NOTE — OP NOTE
Procedure Date: 08/03/2023    PREOPERATIVE DIAGNOSIS:  Left axillary mass.    POSTOPERATIVE DIAGNOSIS:  Left axillary mass.    PROCEDURE:  Left axillary mass excision.    SURGEON:  Sarah Hanna MD.    RESIDENT:  Tre Dewitt MD.    ANESTHESIA:  General anesthesia with LMA.    COMPLICATIONS:  Nil.    DRAINS:  Nil.    SPECIMENS:  Left axillary mass.    BLOOD LOSS:  2 mL.    DESCRIPTION OF PROCEDURE:  After informed consent was taken from the patient, the proper site and procedure was ascertained with her and she was appropriately marked and taken to the operating room.  She was placed in supine position with the knees comfortably flexed, pillows underneath them and pneumoboots placed and running prior to induction of anesthesia.  Preoperative antibiotics given in the OR.  All pressure points were appropriately padded.  General anesthesia was administered without any complications.  She was prepped and draped in standard surgical fashion.  I went ahead and first palpated the mass.  It was a subcutaneous axillary mass.  I then marked about a 3 cm incision over the mass in the left axilla in the hair-bearing area.  I made the cut, dissected using electrocautery down through the subcutaneous tissues down to the mass.  The mass was a well-circumscribed, firm, rubbery, encapsulated, irregularly shaped mass that was easily peeled off of the deeper structures without any issues.  It was not attached to any major structure.  It was not invading any major area.  The entire mass was taken out completely and sent off the table as specimen.  Hemostasis was ensured and the wound was closed in layers using 2-0 Monocryl suture in deep dermal layer and 3-0 Stratafix suture in a running manner, followed by surgical tape and glue.  The patient tolerated the procedure well.  All counts were correct at the end of the case.  The patient was extubated and sen to recovery room in stable condition.    INGRIS Hanna MD        D:  2023   T: 2023   MT: [email or web address]    Name:     SARITHA NG  MRN:      [id number]        Account:        [id number]   :      1975           Procedure Date: 2023     Document: [id number]

## 2023-08-08 LAB
PATH REPORT.COMMENTS IMP SPEC: NORMAL
PATH REPORT.FINAL DX SPEC: NORMAL
PATH REPORT.GROSS SPEC: NORMAL
PATH REPORT.MICROSCOPIC SPEC OTHER STN: NORMAL
PATH REPORT.RELEVANT HX SPEC: NORMAL
PHOTO IMAGE: NORMAL

## 2023-08-11 NOTE — PROGRESS NOTES
[facility]  August 15, 2023    Behavioral Health Clinician Progress Note    Patient Name: Marielos Marti           Service Type:  Individual      Service Location:   Telehealth; see below     Session Start Time: 12:31 pm  Session End Time: 1:28 pm      Session Length: 53 - 60      Attendees: Patient     Service Modality:  Video Visit:      Provider verified identity through the following two step process.  Patient provided:  Patient , Patient address and Patient is known previously to provider    Telemedicine Visit: The patient's condition can be safely assessed and treated via synchronous audio and visual telemedicine encounter.      Reason for Telemedicine Visit: Patient has requested telehealth visit    Originating Site (Patient Location): Patient's home    Distant Site (Provider Location): [facility]    Consent:  The patient/guardian has verbally consented to: the potential risks and benefits of telemedicine (video visit) versus in person care; bill my insurance or make self-payment for services provided; and responsibility for payment of non-covered services.     Patient would like the video invitation sent by:  My Chart    Mode of Communication:  Video Conference via Amwell    Distant Location (Provider):  On-site    As the provider I attest to compliance with applicable laws and regulations related to telemedicine.    Visit Activities (Refresh list every visit): [facility] Only    Diagnostic Assessment Date: 23  Treatment Plan Review Date: 23  CGI Review Date: 11/15/23  Promis 10 Review Date:  11/15/23     Clinical Global Impressions  First:  Considering your total clinical experience with this particular patient population, how severe are the patient's symptoms at this time?: 4 (2023  5:22 PM)    Most recent:  Compared to the patient's condition at the START of treatment, this patient's condition is: 3 (2023  5:22 PM)      See Flowsheets for today's PHQ-9 and MARY-7  "results  Previous PHQ-9:       6/26/2023     4:10 PM 7/24/2023     3:41 PM 8/14/2023    11:51 AM   PHQ-9 SCORE   PHQ-9 Total Score MyChart 9 (Mild depression) 12 (Moderate depression) 8 (Mild depression)   PHQ-9 Total Score 9 12 8     Previous MARY-7:       6/26/2023     4:10 PM 7/24/2023     3:42 PM 8/14/2023    11:51 AM   MARY-7 SCORE   Total Score 17 (severe anxiety) 17 (severe anxiety) 17 (severe anxiety)   Total Score 17 17 17       DERIAN LEVEL:       No data to display                DATA  Extended Session (60+ minutes): No  Interactive Complexity: No  Crisis: No  [facility] Patient: No    Treatment Objective(s) Addressed in This Session:  Target Behavior(s):  increase socialization and activity level, and be more present    Depressed Mood: Increase interest, engagement, and pleasure in doing things  Decrease frequency and intensity of feeling down, depressed, hopeless  Feel less tired and more energy during the day   Identify negative self-talk and behaviors: challenge core beliefs, myths, and actions  Anxiety: will experience a reduction in anxiety, will develop more effective coping skills to manage anxiety symptoms, will develop healthy cognitive patterns and beliefs and will increase ability to function adaptively    Current Stressors / Issues:  Marielos presented with sad, anxious, hurt, and frustrated mood, and congruent affect.  Marielos reported partner's father passed way two weeks ago, and while they are processing the loss and they are also dealing with \"the fallout from that\".  Marielos reported challenging family dynamics with partner's step family, explaining how family relations often feel transactional.  Marielos stated, \"I feel like I'm in the eye of the storm\" and while things have settled down after the death of partner's father, she's worried  \"things will get ugly\" as they deal with the will/estate.  [facility] [place] provided support and assisted with processing her current experience, Marielos shared how this is " "complicated by the fact that the passing triggered thoughts/feelings about her father's death, and she's \"socially fragile and isolated\" in [place] and was hoping partner's family would become part of her support system.  [facility] and Marielos also identified coping strategies to manage current challenges, including setting boundaries, reimagining what the \"relationships look like now?\", self-care, exercise, and planning enjoyable activities.  Marielos wants to continue to work toward building her own community/supports and increase activity level.      Progress on Treatment Objective(s) / Homework:  No improvement - ACTION (Actively working towards change); Intervened by reinforcing change plan / affirming steps taken    Motivational Interviewing    MI Intervention: Co-Developed Goal: Increase socialization and activity level; be more present, Expressed Empathy/Understanding, Supported Autonomy, Collaboration, Evocation, Open-ended questions, Reflections: simple and complex and Change talk (evoked)     Change Talk Expressed by the Patient: Desire to change Ability to change Reasons to change Need to change    Provider Response to Change Talk: E - Evoked more info from patient about behavior change, A - Affirmed patient's thoughts, decisions, or attempts at behavior change, R - Reflected patient's change talk and S - Summarized patient's change talk statements      Psycho-education regarding mental health diagnoses and treatment options    Psychodynamic psychotherapy  Discuss patient's emotional dynamics and issues and how they impact behaviors  Explore patient's history of relationships and how they impact present behaviors  Explore how to work with and make changes in these schemas and patterns    Interpersonal Psychotherapy  Explore patterns in relationships that are effective or ineffective at helping patient reach their goals, find satisfying experience.  Discuss new patterns or behaviors to engage in for improved social " functioning.      Care Plan review completed: Yes    Medication Review:  No changes to current psychiatric medication(s)    Medication Compliance:  Yes    Changes in Health Issues:   None reported    Chemical Use Review:   Substance Use: Chemical use reviewed, no active concerns identified      Tobacco Use: No current tobacco use.      Assessment: Current Emotional / Mental Status (status of significant symptoms):  Risk status (Self / Other harm or suicidal ideation)  Patient has had a history of suicidal ideation: passive, never intent or plan; no SIB, no SA; most recent passive SI was years ago  Patient denies current fears or concerns for personal safety.  Patient denies current or recent suicidal ideation or behaviors.  Patient denies current or recent homicidal ideation or behaviors.  Patient denies current or recent self injurious behavior or ideation.  Patient denies other safety concerns.  A safety and risk management plan has not been developed at this time, however patient was encouraged to call [place] [phone] should there be a change in any of these risk factors.    Appearance:   Appropriate   Eye Contact:   Good   Psychomotor Behavior: Normal   Attitude:   Cooperative   Orientation:   All  Speech   Rate / Production: Normal    Volume:  Normal   Mood:    Sad  Anxious, hurt, frustrated  Affect:    Worrisome   Thought Content:  Clear   Thought Form:  Coherent  Logical   Insight:    Good     Diagnoses:  1. MARY (generalized anxiety disorder)      Collateral Reports Completed:  Routed note to PCP    Plan: (Homework, other):  Patient was given information about behavioral services and encouraged to schedule a follow up appointment with the clinic [facility] in 3 weeks.  She was also given information about mental health symptoms and treatment options .  CD Recommendations: No indications of CD issues.   Shawn Ullrich LIC,  "LADC      ______________________________________________________________________    Integrated Primary Care Behavioral Health Treatment Plan    Patient's Name: Marielos Marti  YOB: 1975    Date of Creation: 5/11/23  Date Treatment Plan Last Reviewed/Revised: 5/11/23    DSM5 Diagnoses: 296.32 (F33.1) Major Depressive Disorder, Recurrent Episode, Moderate _ or 300.02 (F41.1) Generalized Anxiety Disorder  Psychosocial / Contextual Factors: , Partnered, Female, heterosexual; full-time employment;   PROMIS (reviewed every 90 days): pt completed on 5/11/23    Referral / Collaboration:  Referral to another professional/service is not indicated at this time..    Anticipated number of session for this episode of care: 8  Anticipation frequency of session: Every other week  Anticipated Duration of each session: 38-52 minutes  Treatment plan will be reviewed in 90 days or when goals have been changed.       MeasurableTreatment Goal(s) related to diagnosis / functional impairment(s)  Goal 1: Patient will decrease anxiety and improve mood by developing local support network and consistently engaging in physical activity (4x's/week)    I will know I've met my goal when :  \"finding my community in the [facility]\".      Objective #A (Patient Action)    Patient will attend and participate in social or recreational activities to build community around artistic/creative acivity .  Status: New - Date: 5/11/23      Intervention(s)  Therapist will assign homework to brainstorm options to develop community and take one action that gets her closer to her goal weekly.      -Less active on my devices    Objective #B (Patient Action)    Status: New - Date: 5/11/23      Patient will exercise for 30 minutes 4 times per week for 30 minutes.    Intervention(s)  Therapist will teach the client how to complete a 4-part pros and cons. to decrease ambivalence and increase motivation .      Patient has reviewed and agreed " to the above plan.      Shawn G. Ullrich, [facility]  May 11, 2023

## 2023-08-14 ASSESSMENT — ANXIETY QUESTIONNAIRES
GAD7 TOTAL SCORE: 17
3. WORRYING TOO MUCH ABOUT DIFFERENT THINGS: NEARLY EVERY DAY
2. NOT BEING ABLE TO STOP OR CONTROL WORRYING: NEARLY EVERY DAY
6. BECOMING EASILY ANNOYED OR IRRITABLE: SEVERAL DAYS
1. FEELING NERVOUS, ANXIOUS, OR ON EDGE: NEARLY EVERY DAY
GAD7 TOTAL SCORE: 17
IF YOU CHECKED OFF ANY PROBLEMS ON THIS QUESTIONNAIRE, HOW DIFFICULT HAVE THESE PROBLEMS MADE IT FOR YOU TO DO YOUR WORK, TAKE CARE OF THINGS AT HOME, OR GET ALONG WITH OTHER PEOPLE: VERY DIFFICULT
5. BEING SO RESTLESS THAT IT IS HARD TO SIT STILL: SEVERAL DAYS
4. TROUBLE RELAXING: NEARLY EVERY DAY
7. FEELING AFRAID AS IF SOMETHING AWFUL MIGHT HAPPEN: NEARLY EVERY DAY

## 2023-08-15 ENCOUNTER — VIRTUAL VISIT (OUTPATIENT)
Dept: BEHAVIORAL HEALTH | Facility: CLINIC | Age: 48
End: 2023-08-15
Payer: COMMERCIAL

## 2023-08-15 DIAGNOSIS — F41.1 GAD (GENERALIZED ANXIETY DISORDER): Primary | ICD-10-CM

## 2023-08-18 ENCOUNTER — OFFICE VISIT (OUTPATIENT)
Dept: PLASTIC SURGERY | Facility: CLINIC | Age: 48
End: 2023-08-18
Payer: COMMERCIAL

## 2023-08-18 VITALS
HEIGHT: 62 IN | OXYGEN SATURATION: 97 % | SYSTOLIC BLOOD PRESSURE: 114 MMHG | DIASTOLIC BLOOD PRESSURE: 81 MMHG | BODY MASS INDEX: 25.56 KG/M2 | HEART RATE: 58 BPM | WEIGHT: 138.9 LBS

## 2023-08-18 DIAGNOSIS — R22.32 MASS OF LEFT AXILLA: Primary | ICD-10-CM

## 2023-08-18 PROCEDURE — 99024 POSTOP FOLLOW-UP VISIT: CPT | Performed by: PHYSICIAN ASSISTANT

## 2023-08-18 ASSESSMENT — PAIN SCALES - GENERAL: PAINLEVEL: MILD PAIN (2)

## 2023-08-18 NOTE — LETTER
"8/18/2023       RE: Marielos Marti  [address]  [place] [address]     Dear Colleague,    Thank you for referring your patient, Marielos Marti, to the [facility] [place] [facility]. Please see a copy of my visit note below.    Plastic and Reconstructive Surgery Note  8/18/2023  Attending: Dr. Hanna    S: Presents for follow up after axillary mass removal. Some rubbing with incision in arm pit, but otherwise no issues.     O:  /81 (BP Location: Left arm, Patient Position: Sitting, Cuff Size: Adult Regular)   Pulse 58   Ht 1.562 m (5' 1.5\")   Wt 63 kg (138 lb 14.4 oz)   LMP 07/20/2023 (Approximate)   SpO2 97%   BMI 25.82 kg/m    Gen: well appearing, NAD  MSK: left axillary incision healing well. Slightly raised, but without redness, warmth or swelling. Nontender with palpation.     Final Diagnosis   Soft tissue, LEFT axillary, mass, excision:   -Fibroadenoma with usual ductal hyperplasia, size 4 cm, completely excised  -Negative for atypia or malignancy      Assessment: 47 year old female s/p left axillary mass excision     Plan:   Moisturize incision with aquaphor, massage scar while doing this  Can begin scar care at 3 weeks  Caution with shaving area due to scar being raised at this time  When incision is completely healed, deodorant is okay to use  Avoid soaking and swimming until completely healed   Pathology reviewed with patient  All questions answered.       Again, thank you for allowing me to participate in the care of your patient.      Sincerely,    Luann Ramesh PA-C    "

## 2023-08-18 NOTE — NURSING NOTE
"Chief Complaint   Patient presents with    Surgical Followup     Marielos is being seen today for a 2 week post-op, DOS 8/3/23.       Vitals:    08/18/23 0856   BP: 114/81   BP Location: Left arm   Patient Position: Sitting   Cuff Size: Adult Regular   Pulse: 58   SpO2: 97%   Weight: 138 lb 14.4 oz   Height: 5' 1.5\"       Body mass index is 25.82 kg/m .    Vincent Huang, EMT    "

## 2023-08-18 NOTE — PROGRESS NOTES
"Plastic and Reconstructive Surgery Note  8/18/2023  Attending: Dr. Hanna    S: Presents for follow up after axillary mass removal. Some rubbing with incision in arm pit, but otherwise no issues.     O:  /81 (BP Location: Left arm, Patient Position: Sitting, Cuff Size: Adult Regular)   Pulse 58   Ht 1.562 m (5' 1.5\")   Wt 63 kg (138 lb 14.4 oz)   LMP 07/20/2023 (Approximate)   SpO2 97%   BMI 25.82 kg/m    Gen: well appearing, NAD  MSK: left axillary incision healing well. Slightly raised, but without redness, warmth or swelling. Nontender with palpation.     Final Diagnosis   Soft tissue, LEFT axillary, mass, excision:   -Fibroadenoma with usual ductal hyperplasia, size 4 cm, completely excised  -Negative for atypia or malignancy      Assessment: 47 year old female s/p left axillary mass excision     Plan:   Moisturize incision with aquaphor, massage scar while doing this  Can begin scar care at 3 weeks  Caution with shaving area due to scar being raised at this time  When incision is completely healed, deodorant is okay to use  Avoid soaking and swimming until completely healed   Pathology reviewed with patient  All questions answered.     "

## 2023-09-01 NOTE — PROGRESS NOTES
[facility]  2023    Behavioral Health Clinician Progress Note    Patient Name: Marielos Marti           Service Type:  Individual      Service Location:   Telehealth; see below     Session Start Time: 4:01 pm  Session End Time: 4:58 pm      Session Length: 53 - 60      Attendees: Patient     Service Modality:  Video Visit:      Provider verified identity through the following two step process.  Patient provided:  Patient , Patient address and Patient is known previously to provider    Telemedicine Visit: The patient's condition can be safely assessed and treated via synchronous audio and visual telemedicine encounter.      Reason for Telemedicine Visit: Patient has requested telehealth visit    Originating Site (Patient Location): Patient's home    Distant Site (Provider Location): [facility]    Consent:  The patient/guardian has verbally consented to: the potential risks and benefits of telemedicine (video visit) versus in person care; bill my insurance or make self-payment for services provided; and responsibility for payment of non-covered services.     Patient would like the video invitation sent by:  My Chart    Mode of Communication:  Video Conference via Amwell    Distant Location (Provider):  On-site    As the provider I attest to compliance with applicable laws and regulations related to telemedicine.    Visit Activities (Refresh list every visit): [facility] Only    Diagnostic Assessment Date: 23  Treatment Plan Review Date: 23  CGI Review Date: 11/15/23  Promis 10 Review Date:  11/15/23     Clinical Global Impressions  First:  Considering your total clinical experience with this particular patient population, how severe are the patient's symptoms at this time?: 4 (8/15/2023  1:33 PM)    Most recent:  Compared to the patient's condition at the START of treatment, this patient's condition is: 3 (8/15/2023  1:33 PM)      See Flowsheets for today's PHQ-9 and  "AMRY-7 results  Previous PHQ-9:       7/24/2023     3:41 PM 8/14/2023    11:51 AM 9/5/2023    10:03 AM   PHQ-9 SCORE   PHQ-9 Total Score MyChart 12 (Moderate depression) 8 (Mild depression) 10 (Moderate depression)   PHQ-9 Total Score 12 8 10     Previous MARY-7:       7/24/2023     3:42 PM 8/14/2023    11:51 AM 9/5/2023    10:04 AM   MARY-7 SCORE   Total Score 17 (severe anxiety) 17 (severe anxiety) 16 (severe anxiety)   Total Score 17 17 16       DERIAN LEVEL:       No data to display                DATA  Extended Session (60+ minutes): No  Interactive Complexity: No  Crisis: No  [facility] Patient: No    Treatment Objective(s) Addressed in This Session:  Target Behavior(s):  increase socialization and activity level, and be more present    Depressed Mood: Increase interest, engagement, and pleasure in doing things  Decrease frequency and intensity of feeling down, depressed, hopeless  Feel less tired and more energy during the day   Identify negative self-talk and behaviors: challenge core beliefs, myths, and actions  Anxiety: will experience a reduction in anxiety, will develop more effective coping skills to manage anxiety symptoms, will develop healthy cognitive patterns and beliefs and will increase ability to function adaptively    Current Stressors / Issues:  Marielos reported \"work has been hard lately\" and still dealing with familial issues resulting from passing of partner's father.  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  Marielos has been at this job for almost 10 years and while she can do the work, she stated \"I'm not learning, I'm not growing, I'm not meeting new people\", thus the job has become less fulfilling so she's less compelled to continue to put up with the demands of an unfulfilling work culture.  Marielos also acknowledged working remotely, " "while comfortable, is not contributing to her overall health, but most likely detracting from her health.  Marielos stated working remotely was never the long-term plan, but she did not feel stable enough to look for another job while navigating the stress and anxiety of moving to another state, moving during the pandemic, settling into her new home, and dealing with the declining health/passing of her partner's father.  Marielos stated she still feels unsettled about all the change and needs more time before she feels mentally stable enough to take on the stress, and uncertainty, of looking for another job.   In order to increase motivation to take action to resolve job issue, [facility] and Marielos identified potential barriers and benefits to job search.  Marielos noted low self-confidence is a significant barrier, but without prompting she shared how she already found a couple interesting job for which she's qualified.  Marielos also noted she could change quinones completely.  Marielos expressed increased confidence stating \"There's a lot of things I could do\", and shared how finding a job that provides meaning, growth, and interaction with others is most important.  Marielos expressed optimism and excitement about potential job change.     [facility] and Marielos reviewed recent familial events.  Marielos stated her partner's step family's reaction to the passing of partner's father and subsequent actions are \"not surprising, but disappointing\".  [facility] validated her experience and then focused on her management of events.  Marielos stated \"ride the waves\", explaining she's not engaging in emotional provocations.  Marielos also reported she's supporting and advocating for her partner.  Lastly, Marielos reported she is showing up with the best intentions in order to maintain positive relationships.     Finally, Marielos reported she is attempting to remain more active including, physically (biking) and socially (\"social [place] is getting better\"), and " she's recognized need for self-care by taking several days off work.      Progress on Treatment Objective(s) / Homework:  No improvement - ACTION (Actively working towards change); Intervened by reinforcing change plan / affirming steps taken    Motivational Interviewing    MI Intervention: Co-Developed Goal: Increase socialization and activity level; be more present, Expressed Empathy/Understanding, Supported Autonomy, Collaboration, Evocation, Open-ended questions, Reflections: simple and complex and Change talk (evoked)     Change Talk Expressed by the Patient: Desire to change Ability to change Reasons to change Need to change    Provider Response to Change Talk: E - Evoked more info from patient about behavior change, A - Affirmed patient's thoughts, decisions, or attempts at behavior change, R - Reflected patient's change talk and S - Summarized patient's change talk statements      Psycho-education regarding mental health diagnoses and treatment options    Psychodynamic psychotherapy  Discuss patient's emotional dynamics and issues and how they impact behaviors  Explore patient's history of relationships and how they impact present behaviors  Explore how to work with and make changes in these schemas and patterns    Interpersonal Psychotherapy  Explore patterns in relationships that are effective or ineffective at helping patient reach their goals, find satisfying experience.  Discuss new patterns or behaviors to engage in for improved social functioning.      Care Plan review completed: Yes    Medication Review:  No changes to current psychiatric medication(s)    Medication Compliance:  Yes    Changes in Health Issues:   None reported    Chemical Use Review:   Substance Use: Chemical use reviewed, no active concerns identified      Tobacco Use: No current tobacco use.      Assessment: Current Emotional / Mental Status (status of significant symptoms):  Risk status (Self / Other harm or suicidal  ideation)  Patient has had a history of suicidal ideation: passive, never intent or plan; no SIB, no SA; most recent passive SI was years ago  Patient denies current fears or concerns for personal safety.  Patient denies current or recent suicidal ideation or behaviors.  Patient denies current or recent homicidal ideation or behaviors.  Patient denies current or recent self injurious behavior or ideation.  Patient denies other safety concerns.  A safety and risk management plan has not been developed at this time, however patient was encouraged to call [place] [phone] should there be a change in any of these risk factors.    Appearance:   Appropriate   Eye Contact:   Good   Psychomotor Behavior: Normal   Attitude:   Cooperative   Orientation:   All  Speech   Rate / Production: Normal    Volume:  Normal   Mood:    Sad  Anxious  Affect:    Congruent with mood   Thought Content:  Clear   Thought Form:  Coherent  Logical   Insight:    Good     Diagnoses:  1. MARY (generalized anxiety disorder)        Collateral Reports Completed:  Routed note to PCP    Plan: (Homework, other):  Patient was given information about behavioral services and encouraged to schedule a follow up appointment with the clinic [facility] in 1 month.  She was also given information about mental health symptoms and treatment options .  CD Recommendations: No indications of CD issues.   Shawn Ullrich [facility], [facility]      ______________________________________________________________________    Integrated Primary Care Behavioral Health Treatment Plan    Patient's Name: Marielos Marti  YOB: 1975    Date of Creation: 5/11/23  Date Treatment Plan Last Reviewed/Revised: 5/11/23    DSM5 Diagnoses: 296.32 (F33.1) Major Depressive Disorder, Recurrent Episode, Moderate _ or 300.02 (F41.1) Generalized Anxiety Disorder  Psychosocial / Contextual Factors: , Partnered, Female, heterosexual; full-time employment;   PROMIS (reviewed every 90  "days): pt completed on 5/11/23    Referral / Collaboration:  Referral to another professional/service is not indicated at this time..    Anticipated number of session for this episode of care: 8  Anticipation frequency of session: Every other week  Anticipated Duration of each session: 38-52 minutes  Treatment plan will be reviewed in 90 days or when goals have been changed.       MeasurableTreatment Goal(s) related to diagnosis / functional impairment(s)  Goal 1: Patient will decrease anxiety and improve mood by developing local support network and consistently engaging in physical activity (4x's/week)    I will know I've met my goal when :  \"finding my community in the [facility]\".      Objective #A (Patient Action)    Patient will attend and participate in social or recreational activities to build community around artistic/creative acivity .  Status: New - Date: 5/11/23      Intervention(s)  Therapist will assign homework to brainstorm options to develop community and take one action that gets her closer to her goal weekly.      -Less active on my devices    Objective #B (Patient Action)    Status: New - Date: 5/11/23      Patient will exercise for 30 minutes 4 times per week for 30 minutes.    Intervention(s)  Therapist will teach the client how to complete a 4-part pros and cons. to decrease ambivalence and increase motivation .      Patient has reviewed and agreed to the above plan.      Shawn G. Ullrich, [facility]  May 11, 2023  "

## 2023-09-05 ENCOUNTER — VIRTUAL VISIT (OUTPATIENT)
Dept: BEHAVIORAL HEALTH | Facility: CLINIC | Age: 48
End: 2023-09-05
Payer: COMMERCIAL

## 2023-09-05 DIAGNOSIS — F41.1 GAD (GENERALIZED ANXIETY DISORDER): Primary | ICD-10-CM

## 2023-09-05 ASSESSMENT — ANXIETY QUESTIONNAIRES
2. NOT BEING ABLE TO STOP OR CONTROL WORRYING: NEARLY EVERY DAY
4. TROUBLE RELAXING: MORE THAN HALF THE DAYS
5. BEING SO RESTLESS THAT IT IS HARD TO SIT STILL: SEVERAL DAYS
IF YOU CHECKED OFF ANY PROBLEMS ON THIS QUESTIONNAIRE, HOW DIFFICULT HAVE THESE PROBLEMS MADE IT FOR YOU TO DO YOUR WORK, TAKE CARE OF THINGS AT HOME, OR GET ALONG WITH OTHER PEOPLE: VERY DIFFICULT
3. WORRYING TOO MUCH ABOUT DIFFERENT THINGS: NEARLY EVERY DAY
GAD7 TOTAL SCORE: 16
6. BECOMING EASILY ANNOYED OR IRRITABLE: SEVERAL DAYS
7. FEELING AFRAID AS IF SOMETHING AWFUL MIGHT HAPPEN: NEARLY EVERY DAY
GAD7 TOTAL SCORE: 16
1. FEELING NERVOUS, ANXIOUS, OR ON EDGE: NEARLY EVERY DAY

## 2023-09-05 ASSESSMENT — PATIENT HEALTH QUESTIONNAIRE - PHQ9
SUM OF ALL RESPONSES TO PHQ QUESTIONS 1-9: 10
10. IF YOU CHECKED OFF ANY PROBLEMS, HOW DIFFICULT HAVE THESE PROBLEMS MADE IT FOR YOU TO DO YOUR WORK, TAKE CARE OF THINGS AT HOME, OR GET ALONG WITH OTHER PEOPLE: SOMEWHAT DIFFICULT
SUM OF ALL RESPONSES TO PHQ QUESTIONS 1-9: 10

## 2023-10-03 ENCOUNTER — VIRTUAL VISIT (OUTPATIENT)
Dept: BEHAVIORAL HEALTH | Facility: CLINIC | Age: 48
End: 2023-10-03
Payer: COMMERCIAL

## 2023-10-03 DIAGNOSIS — F41.1 GAD (GENERALIZED ANXIETY DISORDER): Primary | ICD-10-CM

## 2023-10-03 NOTE — PROGRESS NOTES
[facility]  October 3, 2023    Behavioral Health Clinician Progress Note    Patient Name: Marielos Marti           Service Type:  Individual      Service Location:   Telehealth; see below     Session Start Time: 4:03 pm  Session End Time: 4:58 pm      Session Length: 53 - 60      Attendees: Patient     Service Modality:  Video Visit:      Provider verified identity through the following two step process.  Patient provided:  Patient , Patient address and Patient is known previously to provider    Telemedicine Visit: The patient's condition can be safely assessed and treated via synchronous audio and visual telemedicine encounter.      Reason for Telemedicine Visit: Patient has requested telehealth visit    Originating Site (Patient Location): Patient's home    [facility]    Consent:  The patient/guardian has verbally consented to: the potential risks and benefits of telemedicine (video visit) versus in person care; bill my insurance or make self-payment for services provided; and responsibility for payment of non-covered services.     Patient would like the video invitation sent by:  My Chart    Mode of Communication:  Video Conference via Amwell    Distant Location (Provider):  On-site    As the provider I attest to compliance with applicable laws and regulations related to telemedicine.    Visit Activities (Refresh list every visit): [facility] Only    Diagnostic Assessment Date: 23  Treatment Plan Review Date: 23  CGI Review Date: 11/15/23  Promis 10 Review Date:  11/15/23     Clinical Global Impressions  First:  Considering your total clinical experience with this particular patient population, how severe are the patient's symptoms at this time?: 4 (8/15/2023  1:33 PM)    Most recent:  Compared to the patient's condition at the START of treatment, this patient's condition is: 3 (8/15/2023  1:33 PM)      See Flowsheets for today's PHQ-9 and MARY-7  "results  Previous PHQ-9:       7/24/2023     3:41 PM 8/14/2023    11:51 AM 9/5/2023    10:03 AM   PHQ-9 SCORE   PHQ-9 Total Score MyChart 12 (Moderate depression) 8 (Mild depression) 10 (Moderate depression)   PHQ-9 Total Score 12 8 10     Previous MARY-7:       7/24/2023     3:42 PM 8/14/2023    11:51 AM 9/5/2023    10:04 AM   MARY-7 SCORE   Total Score 17 (severe anxiety) 17 (severe anxiety) 16 (severe anxiety)   Total Score 17 17 16       DERIAN LEVEL:       No data to display                DATA  Extended Session (60+ minutes): No  Interactive Complexity: No  Crisis: No  [facility] Patient: No    Treatment Objective(s) Addressed in This Session:  Target Behavior(s):  increase socialization and activity level, and be more present    Depressed Mood: Increase interest, engagement, and pleasure in doing things  Decrease frequency and intensity of feeling down, depressed, hopeless  Feel less tired and more energy during the day   Identify negative self-talk and behaviors: challenge core beliefs, myths, and actions  Anxiety: will experience a reduction in anxiety, will develop more effective coping skills to manage anxiety symptoms, will develop healthy cognitive patterns and beliefs and will increase ability to function adaptively    Current Stressors / Issues:  Marielos reported she and partner are still navigating family issues/dynamics that continue to present themselves as her partner settles his father's estate.  [facility] provided support and assisted with processing thoughts/emotions, Marielos stated, \"Its a lot of personalities, its a lot of feelings, a lot of hurt feelings\".  Marielos explained her partner's step mother has made repeated accusations that her partner is taking money from her.  Marielos stated her partner is not money driven, its frugal, moved back to care for his father, and now she's making accusations that directly contradict her partner's character and values.  Marielos stated, \"Its so painful...It pains me to deal " "with it\", she's in \"disbelief\" and questioned \"I don't know how to deal with it?\".    [facility] and Marielos then focused on strategies to manage step mother's behavior.  Marielos reported her partner, and her, and disengaging from the de-escalation and are keeping responses short and professional.  Marielos reported they are setting healthy boundaries for themselves .  Finally, Marielos reported they are remaining aware that step-mother's behaviors say more about her than them, and these are her own issues that are resulting in these behaviors.     Marielos acknowledged the above has led to \"the black cloud of anxiety\" becoming more prominent lately. [facility] used MI interventions to increase motivation to take effective action to reduce anxiety.  Marielos stated, \"I'm ready to move on\", she's exploring new jobs and \"I feel good about it\".  Marielos reported her and partner are planning vacations in the new year, but in the meantime, she's been riding her bike, and attempting to engage in enjoyable activities.     Progress on Treatment Objective(s) / Homework:  No improvement - ACTION (Actively working towards change); Intervened by reinforcing change plan / affirming steps taken    Motivational Interviewing    MI Intervention: Co-Developed Goal: Increase socialization and activity level; be more present, Expressed Empathy/Understanding, Supported Autonomy, Collaboration, Evocation, Open-ended questions, Reflections: simple and complex and Change talk (evoked)     Change Talk Expressed by the Patient: Desire to change Ability to change Reasons to change Need to change    Provider Response to Change Talk: E - Evoked more info from patient about behavior change, A - Affirmed patient's thoughts, decisions, or attempts at behavior change, R - Reflected patient's change talk and S - Summarized patient's change talk statements      Psycho-education regarding mental health diagnoses and treatment options    Psychodynamic psychotherapy  Discuss " patient's emotional dynamics and issues and how they impact behaviors  Explore patient's history of relationships and how they impact present behaviors  Explore how to work with and make changes in these schemas and patterns    Interpersonal Psychotherapy  Explore patterns in relationships that are effective or ineffective at helping patient reach their goals, find satisfying experience.  Discuss new patterns or behaviors to engage in for improved social functioning.      Care Plan review completed: Yes    Medication Review:  No changes to current psychiatric medication(s)    Medication Compliance:  Yes    Changes in Health Issues:   None reported    Chemical Use Review:   Substance Use: Chemical use reviewed, no active concerns identified      Tobacco Use: No current tobacco use.      Assessment: Current Emotional / Mental Status (status of significant symptoms):  Risk status (Self / Other harm or suicidal ideation)  Patient has had a history of suicidal ideation: passive, never intent or plan; no SIB, no SA; most recent passive SI was years ago  Patient denies current fears or concerns for personal safety.  Patient denies current or recent suicidal ideation or behaviors.  Patient denies current or recent homicidal ideation or behaviors.  Patient denies current or recent self injurious behavior or ideation.  Patient denies other safety concerns.  A safety and risk management plan has not been developed at this time, however patient was encouraged to call [facility] should there be a change in any of these risk factors.    Appearance:   Appropriate   Eye Contact:   Good   Psychomotor Behavior: Normal   Attitude:   Cooperative   Orientation:   All  Speech   Rate / Production: Normal    Volume:  Normal   Mood:    Sad  Anxious  Affect:    Worrisome   Thought Content:  Clear   Thought Form:  Coherent  Logical   Insight:    Good     Diagnoses:  1. MARY (generalized anxiety disorder)          Collateral Reports  "Completed:  Routed note to PCP    Plan: (Homework, other):  Patient was given information about behavioral services and encouraged to schedule a follow up appointment with the clinic [facility] in 3 weeks.  She was also given information about mental health symptoms and treatment options .  CD Recommendations: No indications of CD issues.   Shawn Ullrich [facility], [facility]      ______________________________________________________________________    Integrated Primary Care Behavioral Health Treatment Plan    Patient's Name: Marielos Marti  YOB: 1975    Date of Creation: 5/11/23  Date Treatment Plan Last Reviewed/Revised: 5/11/23    DSM5 Diagnoses: 296.32 (F33.1) Major Depressive Disorder, Recurrent Episode, Moderate _ or 300.02 (F41.1) Generalized Anxiety Disorder  Psychosocial / Contextual Factors: , Partnered, Female, heterosexual; full-time employment;   PROMIS (reviewed every 90 days): pt completed on 5/11/23    Referral / Collaboration:  Referral to another professional/service is not indicated at this time..    Anticipated number of session for this episode of care: 8  Anticipation frequency of session: Every other week  Anticipated Duration of each session: 38-52 minutes  Treatment plan will be reviewed in 90 days or when goals have been changed.       MeasurableTreatment Goal(s) related to diagnosis / functional impairment(s)  Goal 1: Patient will decrease anxiety and improve mood by developing local support network and consistently engaging in physical activity (4x's/week)    I will know I've met my goal when :  \"finding my community in the [facility]\".      Objective #A (Patient Action)    Patient will attend and participate in social or recreational activities to build community around artistic/creative acivity .  Status: New - Date: 5/11/23      Intervention(s)  Therapist will assign homework to brainstorm options to develop community and take one action that gets her closer to her goal " weekly.      -Less active on my devices    Objective #B (Patient Action)    Status: New - Date: 5/11/23      Patient will exercise for 30 minutes 4 times per week for 30 minutes.    Intervention(s)  Therapist will teach the client how to complete a 4-part pros and cons. to decrease ambivalence and increase motivation .      Patient has reviewed and agreed to the above plan.      Shawn G. Ullrich, [facility]  May 11, 2023

## 2023-10-26 ASSESSMENT — ANXIETY QUESTIONNAIRES
2. NOT BEING ABLE TO STOP OR CONTROL WORRYING: NEARLY EVERY DAY
4. TROUBLE RELAXING: NEARLY EVERY DAY
5. BEING SO RESTLESS THAT IT IS HARD TO SIT STILL: SEVERAL DAYS
6. BECOMING EASILY ANNOYED OR IRRITABLE: SEVERAL DAYS
3. WORRYING TOO MUCH ABOUT DIFFERENT THINGS: NEARLY EVERY DAY
GAD7 TOTAL SCORE: 17
7. FEELING AFRAID AS IF SOMETHING AWFUL MIGHT HAPPEN: NEARLY EVERY DAY
IF YOU CHECKED OFF ANY PROBLEMS ON THIS QUESTIONNAIRE, HOW DIFFICULT HAVE THESE PROBLEMS MADE IT FOR YOU TO DO YOUR WORK, TAKE CARE OF THINGS AT HOME, OR GET ALONG WITH OTHER PEOPLE: VERY DIFFICULT
1. FEELING NERVOUS, ANXIOUS, OR ON EDGE: NEARLY EVERY DAY
GAD7 TOTAL SCORE: 17

## 2023-10-26 NOTE — PROGRESS NOTES
[facility]  October 27, 2023    Behavioral Health Clinician Progress Note    Patient Name: Marielos Marti           Service Type:  Individual      Service Location:   Telehealth; see below     Session Start Time: 3:30 pm  Session End Time: 4:26 pm      Session Length: 53 - 60      Attendees: Patient     Service Modality:  Face to Face, In-Clinic    Visit Activities (Refresh list every visit): [facility] Only    Diagnostic Assessment Date: 1/27/23  Treatment Plan Review Date: 11/11/23  CGI Review Date: 11/15/23  Promis 10 Review Date:  11/15/23     Clinical Global Impressions  First:  Considering your total clinical experience with this particular patient population, how severe are the patient's symptoms at this time?: 4 (8/15/2023  1:33 PM)    Most recent:  Compared to the patient's condition at the START of treatment, this patient's condition is: 3 (8/15/2023  1:33 PM)      See Flowsheets for today's PHQ-9 and MARY-7 results  Previous PHQ-9:       8/14/2023    11:51 AM 9/5/2023    10:03 AM 10/26/2023     1:08 PM   PHQ-9 SCORE   PHQ-9 Total Score MyChart 8 (Mild depression) 10 (Moderate depression) 12 (Moderate depression)   PHQ-9 Total Score 8 10 12     Previous MARY-7:       8/14/2023    11:51 AM 9/5/2023    10:04 AM 10/26/2023     1:08 PM   MARY-7 SCORE   Total Score 17 (severe anxiety) 16 (severe anxiety) 17 (severe anxiety)   Total Score 17 16 17       DERIAN LEVEL:       No data to display                DATA  Extended Session (60+ minutes): No  Interactive Complexity: No  Crisis: No  [facility] Patient: No    Treatment Objective(s) Addressed in This Session:  Target Behavior(s):  increase socialization and activity level, and be more present    Depressed Mood: Increase interest, engagement, and pleasure in doing things  Decrease frequency and intensity of feeling down, depressed, hopeless  Feel less tired and more energy during the day   Identify negative self-talk and behaviors: challenge core beliefs,  "myths, and actions  Anxiety: will experience a reduction in anxiety, will develop more effective coping skills to manage anxiety symptoms, will develop healthy cognitive patterns and beliefs and will increase ability to function adaptively    Current Stressors / Issues:  Marielos reported she hasn't had as much contact with partner's family lately and its been \"really good\".  Marielos reported she's been less fatigued, more productive at work, and more active outside of work. Despite the aforementioned positive developments, Marielos's PHQ-9 and MARY-7 scores were mildly worse than previous appt, so [facility] developed discrepancy by reflecting recent positive developments with worsening scores.  Marielos shared that while stress and challenges involving partner's family have decreased, other life stressors have taken a toll.  Marielos reported their home is a daily stressor, not only because the back wall of the building remains unrepaired and cost a significant amount of money, but the small area between their garage and the building has been used throughout the summer/fall as a place for people to use drugs, defecate, urinate, and do other illegal/unhealthy/unsafe activities.  [facility] assisted with processing thoughts and emotions, Marielos responded by sharing varying and sometimes contrasting emotions, including, she doesn't feel \"safe\" around or in her home, she's sad, she's upset, she feels bad for the people in the community, she wants to help the people in the community yet the people don't care about the community, etc.  Marielos noted the complicated and contrasting feelings are \"hard to deal with\" and \"our core beliefs...have been put to the test\".  Marielos noted \"Its a terrible situation (for everyone)...I have to cope with it the best I can\".  Marielos reported she will continue to work with neighbors to increase safety and support each other.     Lastly, Marielos reported she is planning to attend informational meeting about " volunteering at the [facility], which would help her to increase engagement with others and develop her own community.          Progress on Treatment Objective(s) / Homework:  No improvement - ACTION (Actively working towards change); Intervened by reinforcing change plan / affirming steps taken    Motivational Interviewing    MI Intervention: Co-Developed Goal: Increase socialization and activity level; be more present, Expressed Empathy/Understanding, Supported Autonomy, Collaboration, Evocation, Open-ended questions, Reflections: simple and complex and Change talk (evoked)     Change Talk Expressed by the Patient: Desire to change Ability to change Reasons to change Need to change    Provider Response to Change Talk: E - Evoked more info from patient about behavior change, A - Affirmed patient's thoughts, decisions, or attempts at behavior change, R - Reflected patient's change talk and S - Summarized patient's change talk statements      Psycho-education regarding mental health diagnoses and treatment options    Psychodynamic psychotherapy  Discuss patient's emotional dynamics and issues and how they impact behaviors  Explore patient's history of relationships and how they impact present behaviors  Explore how to work with and make changes in these schemas and patterns    Interpersonal Psychotherapy  Explore patterns in relationships that are effective or ineffective at helping patient reach their goals, find satisfying experience.  Discuss new patterns or behaviors to engage in for improved social functioning.      Care Plan review completed: Yes    Medication Review:  No changes to current psychiatric medication(s)    Medication Compliance:  Yes    Changes in Health Issues:   None reported    Chemical Use Review:   Substance Use: Chemical use reviewed, no active concerns identified      Tobacco Use: No current tobacco use.      Assessment: Current Emotional / Mental Status (status of significant  symptoms):  Risk status (Self / Other harm or suicidal ideation)  Patient has had a history of suicidal ideation: passive, never intent or plan; no SIB, no SA; most recent passive SI was years ago  Patient denies current fears or concerns for personal safety.  Patient denies current or recent suicidal ideation or behaviors.  Patient denies current or recent homicidal ideation or behaviors.  Patient denies current or recent self injurious behavior or ideation.  Patient denies other safety concerns.  A safety and risk management plan has not been developed at this time, however patient was encouraged to call [place] [phone] should there be a change in any of these risk factors.    Appearance:   Appropriate   Eye Contact:   Good   Psychomotor Behavior: Normal   Attitude:   Cooperative   Orientation:   All  Speech   Rate / Production: Normal    Volume:  Normal   Mood:    Anxious  low    Affect:    Worrisome   Thought Content:  Clear   Thought Form:  Coherent  Logical   Insight:    Good     Diagnoses:  1. MARY (generalized anxiety disorder)        Collateral Reports Completed:  Routed note to PCP    Plan: (Homework, other):  Patient was given information about behavioral services and encouraged to schedule a follow up appointment with the clinic [facility] in 1 month.  She was also given information about mental health symptoms and treatment options .  CD Recommendations: No indications of CD issues.   Shawn Ullrich [facility], [facility]      ______________________________________________________________________    Integrated Primary Care Behavioral Health Treatment Plan    Patient's Name: Marielos Marti  YOB: 1975    Date of Creation: 5/11/23  Date Treatment Plan Last Reviewed/Revised: 5/11/23    DSM5 Diagnoses: 296.32 (F33.1) Major Depressive Disorder, Recurrent Episode, Moderate _ or 300.02 (F41.1) Generalized Anxiety Disorder  Psychosocial / Contextual Factors: , Partnered, Female, heterosexual;  "full-time employment;   PROMIS (reviewed every 90 days): pt completed on 5/11/23    Referral / Collaboration:  Referral to another professional/service is not indicated at this time..    Anticipated number of session for this episode of care: 8  Anticipation frequency of session: Every other week  Anticipated Duration of each session: 38-52 minutes  Treatment plan will be reviewed in 90 days or when goals have been changed.       MeasurableTreatment Goal(s) related to diagnosis / functional impairment(s)  Goal 1: Patient will decrease anxiety and improve mood by developing local support network and consistently engaging in physical activity (4x's/week)    I will know I've met my goal when :  \"finding my community in the [facility]\".      Objective #A (Patient Action)    Patient will attend and participate in social or recreational activities to build community around artistic/creative acivity .  Status: New - Date: 5/11/23      Intervention(s)  Therapist will assign homework to brainstorm options to develop community and take one action that gets her closer to her goal weekly.      -Less active on my devices    Objective #B (Patient Action)    Status: New - Date: 5/11/23      Patient will exercise for 30 minutes 4 times per week for 30 minutes.    Intervention(s)  Therapist will teach the client how to complete a 4-part pros and cons. to decrease ambivalence and increase motivation .      Patient has reviewed and agreed to the above plan.      Shawn G. Ullrich, [facility]  May 11, 2023  "

## 2023-10-27 ENCOUNTER — OFFICE VISIT (OUTPATIENT)
Dept: BEHAVIORAL HEALTH | Facility: CLINIC | Age: 48
End: 2023-10-27
Payer: COMMERCIAL

## 2023-10-27 DIAGNOSIS — F41.1 GAD (GENERALIZED ANXIETY DISORDER): Primary | ICD-10-CM

## 2023-11-17 NOTE — PROGRESS NOTES
[facility]  November 21, 2023    Behavioral Health Clinician Progress Note    Patient Name: Marielos Marti           Service Type:  Individual      Service Location:   Telehealth; see below     Session Start Time: 4:03 pm  Session End Time: 4:56 pm      Session Length: 53 - 60      Attendees: Patient     Service Modality:  telehealth; via video    Visit Activities (Refresh list every visit): [facility] Only      Telemedicine Visit: The patient's condition can be safely assessed and treated via synchronous audio and visual telemedicine encounter.      Reason for Telemedicine Visit: Patient has requested telehealth visit    Originating Site (Patient Location): Patient's home      Distant Location (provider location):  On-site    Consent:  The patient/guardian has verbally consented to: the potential risks and benefits of telemedicine (video visit) versus in person care; bill my insurance or make self-payment for services provided; and responsibility for payment of non-covered services.     Mode of Communication:  Video Conference via [email or web address]    As the provider I attest to compliance with applicable laws and regulations related to telemedicine.      Diagnostic Assessment Date: 1/27/23  Treatment Plan Review Date: 2/21/24  CGI Review Date: 2/21/24  Promis 10 Review Date:  2/21/24    Clinical Global Impressions  First:  Considering your total clinical experience with this particular patient population, how severe are the patient's symptoms at this time?: 4 (8/15/2023  1:33 PM)    Most recent:  Compared to the patient's condition at the START of treatment, this patient's condition is: 3 (8/15/2023  1:33 PM)      See Flowsheets for today's PHQ-9 and MARY-7 results  Previous PHQ-9:       9/5/2023    10:03 AM 10/26/2023     1:08 PM 11/21/2023     1:12 PM   PHQ-9 SCORE   PHQ-9 Total Score MyChart 10 (Moderate depression) 12 (Moderate depression) 6 (Mild depression)   PHQ-9 Total Score 10 12 6     Previous  "MARY-7:       9/5/2023    10:04 AM 10/26/2023     1:08 PM 11/21/2023     1:13 PM   MARY-7 SCORE   Total Score 16 (severe anxiety) 17 (severe anxiety) 12 (moderate anxiety)   Total Score 16 17 12       DATA  Extended Session (60+ minutes): No  Interactive Complexity: No  Crisis: No  [facility] Patient: No    Treatment Objective(s) Addressed in This Session:  Target Behavior(s):  increase socialization and activity level, and be more present    Depressed Mood: Increase interest, engagement, and pleasure in doing things  Decrease frequency and intensity of feeling down, depressed, hopeless  Feel less tired and more energy during the day   Identify negative self-talk and behaviors: challenge core beliefs, myths, and actions  Anxiety: will experience a reduction in anxiety, will develop more effective coping skills to manage anxiety symptoms, will develop healthy cognitive patterns and beliefs and will increase ability to function adaptively    Current Stressors / Issues:  Marielos presented with anxious mood, stating work is \"crazy\", she's preparing multiple dishes for multiple holiday meals and she will have contact with partner's (step) family during Thanksgiving, which will be more stressful due to the tension/conflict that has arisen since the declining health and passing of partner's father.   [facility] provided support and assisted with processing stressors, and then [facility] and Marielso focused on identifying effective coping strategies she can use to manage competing stressors.  Marielos reported she will need to set boundaries with partner's family, so [facility] educated Marielos about establishing and maintaining boundaries. Marielos reported she's still working toward goal of creating a community in [place] and feeling a sense of \"connection\", so she is signing up for master  class (understanding of and connection to the environment, volunteering opportunity).  And she is taking care of herself [place] " "[place]/friends, including visiting mother/family the week before Xmas and spring trip for wedding; teja is \"excited\" for trips. And finally,  Teja is still open to the volunteer opportunity at [facility] as another opportunity to establish community and connection.       [facility] and Teja reflected back on the past couple years and the difficult and dramatic transition to [place], and after processing the entirety of the transition, Teja stated, \"I'm right where I need to be\".      Progress on Treatment Objective(s) / Homework:  Minimal progress - ACTION (Actively working towards change); Intervened by reinforcing change plan / affirming steps taken    Motivational Interviewing    MI Intervention: Co-Developed Goal: Increase socialization and activity level; be more present, Expressed Empathy/Understanding, Supported Autonomy, Collaboration, Evocation, Open-ended questions, Reflections: simple and complex and Change talk (evoked)     Change Talk Expressed by the Patient: Desire to change Ability to change Reasons to change Need to change    Provider Response to Change Talk: E - Evoked more info from patient about behavior change, A - Affirmed patient's thoughts, decisions, or attempts at behavior change, R - Reflected patient's change talk and S - Summarized patient's change talk statements      Psycho-education regarding mental health diagnoses and treatment options    Psychodynamic psychotherapy  Discuss patient's emotional dynamics and issues and how they impact behaviors  Explore patient's history of relationships and how they impact present behaviors  Explore how to work with and make changes in these schemas and patterns    Interpersonal Psychotherapy  Explore patterns in relationships that are effective or ineffective at helping patient reach their goals, find satisfying experience.  Discuss new patterns or behaviors to engage in for improved social functioning.      Care Plan review completed: " knee pain/injury Yes    Medication Review:  No changes to current psychiatric medication(s)    Medication Compliance:  Yes    Changes in Health Issues:   None reported    Chemical Use Review:   Substance Use: Chemical use reviewed, no active concerns identified      Tobacco Use: No current tobacco use.      Assessment: Current Emotional / Mental Status (status of significant symptoms):  Risk status (Self / Other harm or suicidal ideation)  Patient has had a history of suicidal ideation: passive, never intent or plan; no SIB, no SA; most recent passive SI was years ago  Patient denies current fears or concerns for personal safety.  Patient denies current or recent suicidal ideation or behaviors.  Patient denies current or recent homicidal ideation or behaviors.  Patient denies current or recent self injurious behavior or ideation.  Patient denies other safety concerns.  A safety and risk management plan has not been developed at this time, however patient was encouraged to call [place] [phone] should there be a change in any of these risk factors.    Appearance:   Appropriate   Eye Contact:   Good   Psychomotor Behavior: Normal   Attitude:   Cooperative   Orientation:   All  Speech   Rate / Production: Normal    Volume:  Normal   Mood:    Anxious    Affect:    Congruent with mood   Thought Content:  Clear   Thought Form:  Coherent  Logical   Insight:    Good     Diagnoses:  1. MARY (generalized anxiety disorder)          Collateral Reports Completed:  Routed note to PCP    Plan: (Homework, other):  Patient was given information about behavioral services and encouraged to schedule a follow up appointment with the clinic [facility] in 3 weeks.  She was also given information about mental health symptoms and treatment options .  CD Recommendations: No indications of CD issues.   Shawn Ullrich [facility], [facility]      ______________________________________________________________________    Integrated Primary Care Behavioral Health Treatment  "Plan    Patient's Name: Marielos Marti  YOB: 1975    Date of Creation: 11/21/23  Date Treatment Plan Last Reviewed/Revised: 11/21/23    DSM5 Diagnoses: 296.32 (F33.1) Major Depressive Disorder, Recurrent Episode, Moderate _ or 300.02 (F41.1) Generalized Anxiety Disorder  Psychosocial / Contextual Factors: , Partnered, Female, heterosexual; full-time employment;   PROMIS (reviewed every 90 days): pt completed on 11/21/23    Referral / Collaboration:  Referral to another professional/service is not indicated at this time..    Anticipated number of session for this episode of care: 8  Anticipation frequency of session: Every other week  Anticipated Duration of each session: 38-52 minutes  Treatment plan will be reviewed in 90 days or when goals have been changed.       MeasurableTreatment Goal(s) related to diagnosis / functional impairment(s)  Goal 1: Patient will decrease anxiety and improve mood by developing local support network, effectively apply interpersonal skills, and consistently engage in self-care activities    I will know I've met my goal when :  \"finding my community in the [employer]\"; feel \"connection\" to place and people/community      Objective #A (Patient Action)    Patient will attend and participate in social or recreational activities  per week to build connection and community .  Status: Continued - Date(s): 11/21/23    Intervention(s)  Therapist will assign homework to list activities and then schedule into weekly calendar    Objective #B (Patient Action)    Status: New - Date: 11/21/23    Patient will practice setting boundaries 3 times in the next 12 weeks.    Intervention(s)  Therapist will teach about healthy boundaries. With family members .       Objective #C   Patient will use at least one self-care strategy daily  Status: New-Date:  11/21/23  Therapist will teach and then engage pt in creating list of self-care strategies        Patient has reviewed and " agreed to the above plan.      Shawn G. Ullrich, [facility]  November 21, 2023

## 2023-11-21 ENCOUNTER — VIRTUAL VISIT (OUTPATIENT)
Dept: BEHAVIORAL HEALTH | Facility: CLINIC | Age: 48
End: 2023-11-21
Payer: COMMERCIAL

## 2023-11-21 DIAGNOSIS — F41.1 GAD (GENERALIZED ANXIETY DISORDER): Primary | ICD-10-CM

## 2023-11-21 ASSESSMENT — ANXIETY QUESTIONNAIRES
4. TROUBLE RELAXING: MORE THAN HALF THE DAYS
3. WORRYING TOO MUCH ABOUT DIFFERENT THINGS: MORE THAN HALF THE DAYS
2. NOT BEING ABLE TO STOP OR CONTROL WORRYING: NEARLY EVERY DAY
GAD7 TOTAL SCORE: 12
IF YOU CHECKED OFF ANY PROBLEMS ON THIS QUESTIONNAIRE, HOW DIFFICULT HAVE THESE PROBLEMS MADE IT FOR YOU TO DO YOUR WORK, TAKE CARE OF THINGS AT HOME, OR GET ALONG WITH OTHER PEOPLE: SOMEWHAT DIFFICULT
GAD7 TOTAL SCORE: 12
5. BEING SO RESTLESS THAT IT IS HARD TO SIT STILL: SEVERAL DAYS
7. FEELING AFRAID AS IF SOMETHING AWFUL MIGHT HAPPEN: SEVERAL DAYS
6. BECOMING EASILY ANNOYED OR IRRITABLE: NOT AT ALL
1. FEELING NERVOUS, ANXIOUS, OR ON EDGE: NEARLY EVERY DAY

## 2023-12-12 ENCOUNTER — VIRTUAL VISIT (OUTPATIENT)
Dept: BEHAVIORAL HEALTH | Facility: CLINIC | Age: 48
End: 2023-12-12
Payer: COMMERCIAL

## 2023-12-12 DIAGNOSIS — F41.1 GAD (GENERALIZED ANXIETY DISORDER): Primary | ICD-10-CM

## 2023-12-12 ASSESSMENT — ANXIETY QUESTIONNAIRES
4. TROUBLE RELAXING: NEARLY EVERY DAY
1. FEELING NERVOUS, ANXIOUS, OR ON EDGE: NEARLY EVERY DAY
7. FEELING AFRAID AS IF SOMETHING AWFUL MIGHT HAPPEN: MORE THAN HALF THE DAYS
6. BECOMING EASILY ANNOYED OR IRRITABLE: SEVERAL DAYS
2. NOT BEING ABLE TO STOP OR CONTROL WORRYING: NEARLY EVERY DAY
GAD7 TOTAL SCORE: 16
5. BEING SO RESTLESS THAT IT IS HARD TO SIT STILL: SEVERAL DAYS
3. WORRYING TOO MUCH ABOUT DIFFERENT THINGS: NEARLY EVERY DAY
IF YOU CHECKED OFF ANY PROBLEMS ON THIS QUESTIONNAIRE, HOW DIFFICULT HAVE THESE PROBLEMS MADE IT FOR YOU TO DO YOUR WORK, TAKE CARE OF THINGS AT HOME, OR GET ALONG WITH OTHER PEOPLE: SOMEWHAT DIFFICULT
GAD7 TOTAL SCORE: 16

## 2023-12-12 ASSESSMENT — PATIENT HEALTH QUESTIONNAIRE - PHQ9
10. IF YOU CHECKED OFF ANY PROBLEMS, HOW DIFFICULT HAVE THESE PROBLEMS MADE IT FOR YOU TO DO YOUR WORK, TAKE CARE OF THINGS AT HOME, OR GET ALONG WITH OTHER PEOPLE: SOMEWHAT DIFFICULT
SUM OF ALL RESPONSES TO PHQ QUESTIONS 1-9: 9
SUM OF ALL RESPONSES TO PHQ QUESTIONS 1-9: 9

## 2023-12-12 NOTE — PROGRESS NOTES
[facility]  December 12, 2023    Behavioral Health Clinician Progress Note    Patient Name: Marielos Marti           Service Type:  Individual      Service Location:   Telehealth; see below     Session Start Time: 4:02 pm  Session End Time: 4:55 pm      Session Length: 53 - 60      Attendees: Patient     Service Modality:  Telehealth    Visit Activities (Refresh list every visit): [facility] [place] Only      Telemedicine Visit: The patient's condition can be safely assessed and treated via synchronous audio and visual telemedicine encounter.      Reason for Telemedicine Visit: Patient has requested telehealth visit    Originating Site (Patient Location): Patient's home      Distant Location (provider location):  On-site    Consent:  The patient/guardian has verbally consented to: the potential risks and benefits of telemedicine (video visit) versus in person care; bill my insurance or make self-payment for services provided; and responsibility for payment of non-covered services.     Mode of Communication:  Video Conference via [employer]    As the provider I attest to compliance with applicable laws and regulations related to telemedicine.    Diagnostic Assessment Date: 1/27/23  Treatment Plan Review Date: 2/21/24  CGI Review Date: 2/21/24  Promis 10 Review Date:  2/21/24    Clinical Global Impressions  First:  Considering your total clinical experience with this particular patient population, how severe are the patient's symptoms at this time?: 4 (11/21/2023  4:00 PM)    Most recent:  Compared to the patient's condition at the START of treatment, this patient's condition is: 3 (11/21/2023  4:00 PM)      See Flowsheets for today's PHQ-9 and MARY-7 results  Previous PHQ-9:       10/26/2023     1:08 PM 11/21/2023     1:12 PM 12/12/2023     4:00 PM   PHQ-9 SCORE   PHQ-9 Total Score MyChart 12 (Moderate depression) 6 (Mild depression) 9 (Mild depression)   PHQ-9 Total Score 12 6 9     Previous MARY-7:        "10/26/2023     1:08 PM 11/21/2023     1:13 PM 12/12/2023     4:00 PM   MARY-7 SCORE   Total Score 17 (severe anxiety) 12 (moderate anxiety) 16 (severe anxiety)   Total Score 17 12 16       DATA  Extended Session (60+ minutes): No  Interactive Complexity: No  Crisis: No  [facility] Patient: No    Treatment Objective(s) Addressed in This Session:  Target Behavior(s):  increase socialization and activity level, and be more present    Depressed Mood: Increase interest, engagement, and pleasure in doing things  Decrease frequency and intensity of feeling down, depressed, hopeless  Feel less tired and more energy during the day   Identify negative self-talk and behaviors: challenge core beliefs, myths, and actions  Anxiety: will experience a reduction in anxiety, will develop more effective coping skills to manage anxiety symptoms, will develop healthy cognitive patterns and beliefs and will increase ability to function adaptively    Current Stressors / Issues:  Marielos's partner tested positive for covid on Thanksgiving and then she came down with covid and she was sick of 8 days, so she has not felt well for a good portion of the time since previous appt.  But Marielos and partner are leaving for [place] tomorrow, stating, \"I'm so excited to go to my Mon's house\".  Marielos reported being excited to see family and spend time with partner.    Despite not seeing partner's step family for Thanksgiving, and thus avoid potentially a difficult interaction, Marielos reported she is still stressed and attempting to deal with how the passing of her partner's father has affected partner's step family, and their subsequent behavior toward partner and Marielos.  [facility] assisted with processing Marielos's thoughts, feelings, and experiences regarding partner's step family.   Marielos stated the focus on money has led to a loss of sympathy, compassion, and understanding.  Marielos stated, \"Its really hard\" to see how partner's step mother communicates with him, " "sharing, \"The tone of the text messages was terrible\" and \"This feels so toxic\".  And yet if they attempt to assertively communicate safe boundaries with step family, Marielos and partner risk being immediately cut off from the family, yet Marielos and partner moved to [place] to be closer to family and they want positive relationships with family members.   Marielos was exasperated, stating, \"Its really confusing...I'm still navigiating those relationships\".      Finally, Marielos reported making progress regarding goal of establishing connection and community, sharing she attended the [place]  class, she really enjoyed it and she's looking forward to attending another.       Progress on Treatment Objective(s) / Homework:  Minimal progress - ACTION (Actively working towards change); Intervened by reinforcing change plan / affirming steps taken    Motivational Interviewing    MI Intervention: Co-Developed Goal: Increase socialization and activity level; be more present, Expressed Empathy/Understanding, Supported Autonomy, Collaboration, Evocation, Open-ended questions, Reflections: simple and complex and Change talk (evoked)     Change Talk Expressed by the Patient: Desire to change Ability to change Reasons to change Need to change    Provider Response to Change Talk: E - Evoked more info from patient about behavior change, A - Affirmed patient's thoughts, decisions, or attempts at behavior change, R - Reflected patient's change talk and S - Summarized patient's change talk statements      Psycho-education regarding mental health diagnoses and treatment options    Psychodynamic psychotherapy  Discuss patient's emotional dynamics and issues and how they impact behaviors  Explore patient's history of relationships and how they impact present behaviors  Explore how to work with and make changes in these schemas and patterns    Interpersonal Psychotherapy  Explore patterns in relationships that are effective or ineffective at " helping patient reach their goals, find satisfying experience.  Discuss new patterns or behaviors to engage in for improved social functioning.      Care Plan review completed: Yes    Medication Review:  No changes to current psychiatric medication(s)    Medication Compliance:  Yes    Changes in Health Issues:   None reported    Chemical Use Review:   Substance Use: Chemical use reviewed, no active concerns identified      Tobacco Use: No current tobacco use.      Assessment: Current Emotional / Mental Status (status of significant symptoms):  Risk status (Self / Other harm or suicidal ideation)  Patient has had a history of suicidal ideation: passive, never intent or plan; no SIB, no SA; most recent passive SI was years ago  Patient denies current fears or concerns for personal safety.  Patient denies current or recent suicidal ideation or behaviors.  Patient denies current or recent homicidal ideation or behaviors.  Patient denies current or recent self injurious behavior or ideation.  Patient denies other safety concerns.  A safety and risk management plan has not been developed at this time, however patient was encouraged to call [facility] [place] [facility] should there be a change in any of these risk factors.    Appearance:   Appropriate   Eye Contact:   Good   Psychomotor Behavior: Normal   Attitude:   Cooperative   Orientation:   All  Speech   Rate / Production: Normal    Volume:  Normal   Mood:    Anxious , frustrated   Affect:    Worrisome   Thought Content:  Clear   Thought Form:  Coherent  Logical   Insight:    Good     Diagnoses:  1. MARY (generalized anxiety disorder)        Collateral Reports Completed:  Routed note to PCP    Plan: (Homework, other):  Patient was given information about behavioral services and encouraged to schedule a follow up appointment with the clinic [facility] in 1 month.  She was also given information about mental health symptoms and treatment options .  CD Recommendations: No indications of  "CD issues.   Shawn Ullrich [facility] Medical CenterSW, LADC      ______________________________________________________________________    Integrated Primary Care Behavioral Health Treatment Plan    Patient's Name: Marielos Marti  YOB: 1975    Date of Creation: 11/21/23  Date Treatment Plan Last Reviewed/Revised: 11/21/23    DSM5 Diagnoses: 296.32 (F33.1) Major Depressive Disorder, Recurrent Episode, Moderate _ or 300.02 (F41.1) Generalized Anxiety Disorder  Psychosocial / Contextual Factors: , Partnered, Female, heterosexual; full-time employment;   PROMIS (reviewed every 90 days): pt completed on 11/21/23    Referral / Collaboration:  Referral to another professional/service is not indicated at this time..    Anticipated number of session for this episode of care: 8  Anticipation frequency of session: Every other week  Anticipated Duration of each session: 38-52 minutes  Treatment plan will be reviewed in 90 days or when goals have been changed.       MeasurableTreatment Goal(s) related to diagnosis / functional impairment(s)  Goal 1: Patient will decrease anxiety and improve mood by developing local support network, effectively apply interpersonal skills, and consistently engage in self-care activities    I will know I've met my goal when :  \"finding my community in the [place]\"; feel \"connection\" to place and people/community      Objective #A (Patient Action)    Patient will attend and participate in social or recreational activities  per week to build connection and community .  Status: Continued - Date(s): 11/21/23    Intervention(s)  Therapist will assign homework to list activities and then schedule into weekly calendar    Objective #B (Patient Action)    Status: New - Date: 11/21/23    Patient will practice setting boundaries 3 times in the next 12 weeks.    Intervention(s)  Therapist will teach about healthy boundaries. With family members .       Objective #C   Patient will use at least one " self-care strategy daily  Status: New-Date:  11/21/23  Therapist will teach and then engage pt in creating list of self-care strategies        Patient has reviewed and agreed to the above plan.      Shawn G. Ullrich, [facility]  November 21, 2023

## 2024-01-01 DIAGNOSIS — F41.9 ANXIETY AND DEPRESSION: ICD-10-CM

## 2024-01-01 DIAGNOSIS — F32.A ANXIETY AND DEPRESSION: ICD-10-CM

## 2024-01-03 ENCOUNTER — MYC REFILL (OUTPATIENT)
Dept: FAMILY MEDICINE | Facility: CLINIC | Age: 49
End: 2024-01-03

## 2024-01-03 DIAGNOSIS — F32.A ANXIETY AND DEPRESSION: ICD-10-CM

## 2024-01-03 DIAGNOSIS — F41.9 ANXIETY AND DEPRESSION: ICD-10-CM

## 2024-01-03 RX ORDER — SERTRALINE HYDROCHLORIDE 100 MG/1
100 TABLET, FILM COATED ORAL DAILY
Qty: 90 TABLET | Refills: 3 | Status: CANCELLED | OUTPATIENT
Start: 2024-01-03

## 2024-01-03 RX ORDER — SERTRALINE HYDROCHLORIDE 100 MG/1
100 TABLET, FILM COATED ORAL DAILY
Qty: 30 TABLET | Refills: 0 | Status: SHIPPED | OUTPATIENT
Start: 2024-01-03 | End: 2024-01-11

## 2024-01-03 NOTE — TELEPHONE ENCOUNTER
Sertraline (Zoloft) 100 mg    Last Office Visit: 7/6/23  Future [facility] Appointments: None  Medication last refilled: 1/19/23 #90 with 3 refill(s)    PHQ-9 / MARY-7 Scores  1/17/23 8/14/23 12/12/23   MARY-7 Score DocFlow 16 17 16   PHQ-9 Score DocFlow 11 8 9     Medication is being filled for 1 time refill only due to:  Patient needs to be seen because due for mental health follow up.    The [employer] message sent to patient to call and schedule appointment.    Deepali Aguila, KALPANAN, RN, CCM

## 2024-01-05 NOTE — TELEPHONE ENCOUNTER
Duplicate request.  Dr. Whalen sent a 30-day supply on 1/3/24 due to Marielos needing a follow up appointment.  KALPANA JdudN, RN, CCM  RN Care Coordinator  [facility]  01/05/24  11:52 AM  Phone: [phone]

## 2024-01-09 ASSESSMENT — ANXIETY QUESTIONNAIRES
IF YOU CHECKED OFF ANY PROBLEMS ON THIS QUESTIONNAIRE, HOW DIFFICULT HAVE THESE PROBLEMS MADE IT FOR YOU TO DO YOUR WORK, TAKE CARE OF THINGS AT HOME, OR GET ALONG WITH OTHER PEOPLE: SOMEWHAT DIFFICULT
4. TROUBLE RELAXING: MORE THAN HALF THE DAYS
6. BECOMING EASILY ANNOYED OR IRRITABLE: NOT AT ALL
3. WORRYING TOO MUCH ABOUT DIFFERENT THINGS: NEARLY EVERY DAY
8. IF YOU CHECKED OFF ANY PROBLEMS, HOW DIFFICULT HAVE THESE MADE IT FOR YOU TO DO YOUR WORK, TAKE CARE OF THINGS AT HOME, OR GET ALONG WITH OTHER PEOPLE?: SOMEWHAT DIFFICULT
2. NOT BEING ABLE TO STOP OR CONTROL WORRYING: NEARLY EVERY DAY
1. FEELING NERVOUS, ANXIOUS, OR ON EDGE: NEARLY EVERY DAY
5. BEING SO RESTLESS THAT IT IS HARD TO SIT STILL: NOT AT ALL
GAD7 TOTAL SCORE: 14
7. FEELING AFRAID AS IF SOMETHING AWFUL MIGHT HAPPEN: NEARLY EVERY DAY
7. FEELING AFRAID AS IF SOMETHING AWFUL MIGHT HAPPEN: NEARLY EVERY DAY
GAD7 TOTAL SCORE: 14
GAD7 TOTAL SCORE: 14

## 2024-01-09 ASSESSMENT — PATIENT HEALTH QUESTIONNAIRE - PHQ9
10. IF YOU CHECKED OFF ANY PROBLEMS, HOW DIFFICULT HAVE THESE PROBLEMS MADE IT FOR YOU TO DO YOUR WORK, TAKE CARE OF THINGS AT HOME, OR GET ALONG WITH OTHER PEOPLE: SOMEWHAT DIFFICULT
SUM OF ALL RESPONSES TO PHQ QUESTIONS 1-9: 10
SUM OF ALL RESPONSES TO PHQ QUESTIONS 1-9: 10

## 2024-01-10 NOTE — PROGRESS NOTES
[facility]      PATIENT'S NAME: Marielos Marti  PREFERRED NAME: Marielos  PRONOUNS:  she/her     MRN: [id number]  : 1975  ADDRESS: [address] [place] [id number]  ACCT. NUMBER:  [id number]  DATE OF SERVICE: 24  PREFERRED PHONE: [phone]  May we leave a program related message: Yes  EMERGENCY CONTACT: was not obtained  .  SERVICE MODALITY:  Video Visit:      Provider verified identity through the following two step process.  Patient provided:  Patient , Patient address, and Patient is known previously to provider    Telemedicine Visit: The patient's condition can be safely assessed and treated via synchronous audio and visual telemedicine encounter.      Reason for Telemedicine Visit: Patient has requested telehealth visit    Originating Site (Patient Location): Patient's home    Distant Site (Provider Location): [facility]    Consent:  The patient/guardian has verbally consented to: the potential risks and benefits of telemedicine (video visit) versus in person care; bill my insurance or make self-payment for services provided; and responsibility for payment of non-covered services.     Patient would like the video invitation sent by:  My Chart    Mode of Communication:  Video Conference via Amwell    Distant Location (Provider):  On-site    As the provider I attest to compliance with applicable laws and regulations related to telemedicine.        Behavioral Health Clinician Progress Note    Patient Name: Marielos Marti           Service Type:  Individual      Service Location:   Telehealth; see above     Session Start Time: 4:03 pm  Session End Time: 4:55 pm      Session Length: 38 - 52      Attendees: Patient     Service Modality:  Telehealth    Visit Activities (Refresh list every visit): [facility] Only    Diagnostic Assessment Date: 23  Treatment Plan Review Date: 24  CGI Review Date: 24  Promis 10 Review Date:   "2/21/24    Clinical Global Impressions  First:  Considering your total clinical experience with this particular patient population, how severe are the patient's symptoms at this time?: 4 (11/21/2023  4:00 PM)    Most recent:  Compared to the patient's condition at the START of treatment, this patient's condition is: 3 (11/21/2023  4:00 PM)      See Flowsheets for today's PHQ-9 and MARY-7 results  Previous PHQ-9:       12/12/2023     4:00 PM 1/9/2024     4:27 PM 1/11/2024     4:01 PM   PHQ-9 SCORE   PHQ-9 Total Score MyChart 9 (Mild depression) 10 (Moderate depression) 11 (Moderate depression)   PHQ-9 Total Score 9 10 11     Previous MARY-7:       11/21/2023     1:13 PM 12/12/2023     4:00 PM 1/9/2024     4:27 PM   MARY-7 SCORE   Total Score 12 (moderate anxiety) 16 (severe anxiety) 14 (moderate anxiety)   Total Score 12 16 14       DATA  Extended Session (60+ minutes): No  Interactive Complexity: No  Crisis: No  [facility] Patient: No    Treatment Objective(s) Addressed in This Session:  Target Behavior(s):  increase socialization and activity level, and be more present    Depressed Mood: Increase interest, engagement, and pleasure in doing things  Decrease frequency and intensity of feeling down, depressed, hopeless  Feel less tired and more energy during the day   Identify negative self-talk and behaviors: challenge core beliefs, myths, and actions  Anxiety: will experience a reduction in anxiety, will develop more effective coping skills to manage anxiety symptoms, will develop healthy cognitive patterns and beliefs and will increase ability to function adaptively    Current Stressors / Issues:  Marielos reported and presented with anxious mood, sharing increased workload due to co-worker is out for the next 6 months; needing to deal with the \"undercurrent of anxiety\" from her mother when visiting family in [place]; and continuing to \"reconcile behaviors\" exhibited by partner's step-mother (and step-family).  [facility] conveyed " "understanding by reflecting each of the stressors, and then also validated how the confluence of these issues impact mood.  Then in order to increase Marielos's ability to manage stress, [facility] prompted Marielos to identify past and future coping skills used/can use to effectively manage these challenges.  Marielos reported the following:    -Interpersonal: giving herself and family members space  -spending time outside   -gratitude  -self-care  -health : \"taking care of that (health) and it feels good \"  -hair cut  -Enjoyable activities  -go outside and take pictures with new camera  -baking  -will sign up for [employer] class when they open  -Planning time off     Progress on Treatment Objective(s) / Homework:  Minimal progress - ACTION (Actively working towards change); Intervened by reinforcing change plan / affirming steps taken    Motivational Interviewing    MI Intervention: Co-Developed Goal: Increase socialization and activity level; be more present, Expressed Empathy/Understanding, Supported Autonomy, Collaboration, Evocation, Open-ended questions, Reflections: simple and complex and Change talk (evoked)     Change Talk Expressed by the Patient: Desire to change Ability to change Reasons to change Need to change    Provider Response to Change Talk: E - Evoked more info from patient about behavior change, A - Affirmed patient's thoughts, decisions, or attempts at behavior change, R - Reflected patient's change talk and S - Summarized patient's change talk statements      Solution-Focused Therapy  Explore patterns in patient's behaviors and relationships and discuss options for new behaviors  Explore new options for problem-solving, communication, managing stress, etc.    Care Plan review completed: Yes    Medication Review:  No changes to current psychiatric medication(s)    Medication Compliance:  Yes    Changes in Health Issues:   None reported    Chemical Use Review:   Substance Use: Chemical use " reviewed, no active concerns identified      Tobacco Use: No current tobacco use.      Assessment: Current Emotional / Mental Status (status of significant symptoms):  Risk status (Self / Other harm or suicidal ideation)  Patient has had a history of suicidal ideation: passive, never intent or plan; no SIB, no SA; most recent passive SI was years ago  Patient denies current fears or concerns for personal safety.  Patient denies current or recent suicidal ideation or behaviors.  Patient denies current or recent homicidal ideation or behaviors.  Patient denies current or recent self injurious behavior or ideation.  Patient denies other safety concerns.  A safety and risk management plan has not been developed at this time, however patient was encouraged to call [place] [phone] should there be a change in any of these risk factors.    Appearance:   Appropriate   Eye Contact:   Good   Psychomotor Behavior: Normal   Attitude:   Cooperative   Orientation:   All  Speech   Rate / Production: Normal    Volume:  Normal   Mood:    Anxious   Affect:    Congruent with mood   Thought Content:  Clear   Thought Form:  Coherent  Logical   Insight:    Good     Diagnoses:  1. MARY (generalized anxiety disorder)        Collateral Reports Completed:  Routed note to PCP    Plan: (Homework, other):  Patient was given information about behavioral services and encouraged to schedule a follow up appointment with the clinic [facility] in 1 month.  She was also given information about mental health symptoms and treatment options .  CD Recommendations: No indications of CD issues.   Shawn Ullrich LICSW, [facility]      ______________________________________________________________________    Integrated Primary Care Behavioral Health Treatment Plan    Patient's Name: Marielos Marti  YOB: 1975    Date of Creation: 11/21/23  Date Treatment Plan Last Reviewed/Revised: 11/21/23    DSM5 Diagnoses: 296.32 (F33.1) Major Depressive Disorder,  "Recurrent Episode, Moderate _ or 300.02 (F41.1) Generalized Anxiety Disorder  Psychosocial / Contextual Factors: , Partnered, Female, heterosexual; full-time employment;   PROMIS (reviewed every 90 days): pt completed on 11/21/23    Referral / Collaboration:  Referral to another professional/service is not indicated at this time..    Anticipated number of session for this episode of care: 8  Anticipation frequency of session: Every other week  Anticipated Duration of each session: 38-52 minutes  Treatment plan will be reviewed in 90 days or when goals have been changed.       MeasurableTreatment Goal(s) related to diagnosis / functional impairment(s)  Goal 1: Patient will decrease anxiety and improve mood by developing local support network, effectively apply interpersonal skills, and consistently engage in self-care activities    I will know I've met my goal when :  \"finding my community in the [place] [employer]"; feel \"connection\" to place and people/community      Objective #A (Patient Action)    Patient will attend and participate in social or recreational activities  per week to build connection and community .  Status: Continued - Date(s): 11/21/23    Intervention(s)  Therapist will assign homework to list activities and then schedule into weekly calendar    Objective #B (Patient Action)    Status: New - Date: 11/21/23    Patient will practice setting boundaries 3 times in the next 12 weeks.    Intervention(s)  Therapist will teach about healthy boundaries. With family members .       Objective #C   Patient will use at least one self-care strategy daily  Status: New-Date:  11/21/23  Therapist will teach and then engage pt in creating list of self-care strategies        Patient has reviewed and agreed to the above plan.      Shawn G. Ullrich, [facility]  November 21, 2023  "

## 2024-01-11 ENCOUNTER — OFFICE VISIT (OUTPATIENT)
Dept: FAMILY MEDICINE | Facility: CLINIC | Age: 49
End: 2024-01-11
Payer: COMMERCIAL

## 2024-01-11 ENCOUNTER — VIRTUAL VISIT (OUTPATIENT)
Dept: BEHAVIORAL HEALTH | Facility: CLINIC | Age: 49
End: 2024-01-11
Payer: COMMERCIAL

## 2024-01-11 VITALS
OXYGEN SATURATION: 98 % | HEART RATE: 76 BPM | BODY MASS INDEX: 26.63 KG/M2 | WEIGHT: 141.04 LBS | HEIGHT: 61 IN | TEMPERATURE: 97.3 F | SYSTOLIC BLOOD PRESSURE: 118 MMHG | DIASTOLIC BLOOD PRESSURE: 73 MMHG

## 2024-01-11 DIAGNOSIS — Z23 NEED FOR VACCINATION: ICD-10-CM

## 2024-01-11 DIAGNOSIS — F32.A ANXIETY AND DEPRESSION: Primary | ICD-10-CM

## 2024-01-11 DIAGNOSIS — B00.9 RECURRENT HSV (HERPES SIMPLEX VIRUS): ICD-10-CM

## 2024-01-11 DIAGNOSIS — F41.9 ANXIETY AND DEPRESSION: Primary | ICD-10-CM

## 2024-01-11 DIAGNOSIS — F41.1 GAD (GENERALIZED ANXIETY DISORDER): Primary | ICD-10-CM

## 2024-01-11 PROBLEM — R22.32 MASS OF LEFT AXILLA: Status: RESOLVED | Noted: 2023-06-02 | Resolved: 2024-01-11

## 2024-01-11 RX ORDER — VALACYCLOVIR HYDROCHLORIDE 500 MG/1
500 TABLET, FILM COATED ORAL DAILY
Qty: 90 TABLET | Refills: 3 | Status: SHIPPED | OUTPATIENT
Start: 2024-01-11

## 2024-01-11 RX ORDER — SERTRALINE HYDROCHLORIDE 100 MG/1
100 TABLET, FILM COATED ORAL DAILY
Qty: 90 TABLET | Refills: 3 | Status: SHIPPED | OUTPATIENT
Start: 2024-01-11

## 2024-01-11 ASSESSMENT — PATIENT HEALTH QUESTIONNAIRE - PHQ9
SUM OF ALL RESPONSES TO PHQ QUESTIONS 1-9: 11
SUM OF ALL RESPONSES TO PHQ QUESTIONS 1-9: 11
10. IF YOU CHECKED OFF ANY PROBLEMS, HOW DIFFICULT HAVE THESE PROBLEMS MADE IT FOR YOU TO DO YOUR WORK, TAKE CARE OF THINGS AT HOME, OR GET ALONG WITH OTHER PEOPLE: VERY DIFFICULT

## 2024-01-11 NOTE — PROGRESS NOTES
"CC: Recheck Medication      SUBJECTIVE: Marielos is a 48 year old female who comes in for follow-up of chronic conditions.     Anxiety and mild depression. Long-standing diagnosis.   - Medications: Has been treating with sertraline 100 mg daily. No adverse medication effects.   - Counseling: Patient is currently seeing a therapist/counselor -- Shawn Ullrich.   - Depression worse in the winter, using a sunlight lamp. Depression scores a bit worse than they would be in sunnier months. She spent about 10 minutes outside today in the sunshine though and that helped.   - Mother is on sertraline as well.   - Past medication trials: escitalopram caused sleepiness.      Genital HSV. Takes valacyclovir 500 mg daily for suppression and working well.       OBJECTIVE:   /73   Pulse 76   Temp 97.3  F (36.3  C)   Ht 1.562 m (5' 1.5\")   Wt 64 kg (141 lb 0.6 oz)   LMP 12/31/2023   SpO2 98%   BMI 26.22 kg/m    General: Alert and oriented in no acute distress.  Psych: Mood and affect appropriate.    Answers submitted by the patient for this visit:  Patient Health Questionnaire (Submitted on 1/9/2024)  If you checked off any problems, how difficult have these problems made it for you to do your work, take care of things at home, or get along with other people?: Somewhat difficult  PHQ9 TOTAL SCORE: 10  MARY-7 (Submitted on 1/9/2024)  MARY 7 TOTAL SCORE: 14      ASSESSMENT/PLAN:   Marielos was seen today for recheck medication.    Anxiety and depression  The current medical regimen is effective;  continue present plan and medications. Continue therapy with [facility].   -     sertraline (ZOLOFT) 100 MG tablet; Take 1 tablet (100 mg) by mouth daily    Recurrent HSV (herpes simplex virus)  The current medical regimen is effective;  continue present plan and medications.  -     valACYclovir (VALTREX) 500 MG tablet; Take 1 tablet (500 mg) by mouth daily    Need for vaccination  Due for tetanus vaccine.   -     TDAP VACCINE (Adacel, " Boostrix)  [[id number]]      Worrisome signs and symptoms were discussed with Marielos and she was instructed to return to the clinic for concerning symptoms or to call with questions. Return in about 6 months (around 7/11/2024) for physical.    Geni Whalen MD  Family Medicine

## 2024-01-11 NOTE — NURSING NOTE
Prior to immunization administration, verified patients identity using patient s name and date of birth. Please see Immunization Activity for additional information.     Screening Questionnaire for Adult Immunization    Are you sick today?   No   Do you have allergies to medications, food, a vaccine component or latex?   No   Have you ever had a serious reaction after receiving a vaccination?   No   Do you have a long-term health problem with heart, lung, kidney, or metabolic disease (e.g., diabetes), asthma, a blood disorder, no spleen, complement component deficiency, a cochlear implant, or a spinal fluid leak?  Are you on long-term aspirin therapy?   No   Do you have cancer, leukemia, HIV/AIDS, or any other immune system problem?   No   Do you have a parent, brother, or sister with an immune system problem?   No   In the past 3 months, have you taken medications that affect  your immune system, such as prednisone, other steroids, or anticancer drugs; drugs for the treatment of rheumatoid arthritis, Crohn s disease, or psoriasis; or have you had radiation treatments?   No   Have you had a seizure, or a brain or other nervous system problem?   No   During the past year, have you received a transfusion of blood or blood    products, or been given immune (gamma) globulin or antiviral drug?   No   For women: Are you pregnant or is there a chance you could become       pregnant during the next month?   No   Have you received any vaccinations in the past 4 weeks?   No     Immunization questionnaire answers were all negative.      Patient instructed to remain in clinic for 15 minutes afterwards, and to report any adverse reactions.     Screening performed by Rhea Mullen MA on 1/11/2024 at 1:41 PM.

## 2024-02-06 ENCOUNTER — VIRTUAL VISIT (OUTPATIENT)
Dept: BEHAVIORAL HEALTH | Facility: CLINIC | Age: 49
End: 2024-02-06
Payer: COMMERCIAL

## 2024-02-06 DIAGNOSIS — F41.1 GENERALIZED ANXIETY DISORDER: Primary | ICD-10-CM

## 2024-02-06 DIAGNOSIS — F33.1 MAJOR DEPRESSIVE DISORDER, RECURRENT EPISODE, MODERATE (H): ICD-10-CM

## 2024-02-06 ASSESSMENT — PATIENT HEALTH QUESTIONNAIRE - PHQ9
SUM OF ALL RESPONSES TO PHQ QUESTIONS 1-9: 11
10. IF YOU CHECKED OFF ANY PROBLEMS, HOW DIFFICULT HAVE THESE PROBLEMS MADE IT FOR YOU TO DO YOUR WORK, TAKE CARE OF THINGS AT HOME, OR GET ALONG WITH OTHER PEOPLE: VERY DIFFICULT
SUM OF ALL RESPONSES TO PHQ QUESTIONS 1-9: 11

## 2024-02-06 ASSESSMENT — ANXIETY QUESTIONNAIRES
3. WORRYING TOO MUCH ABOUT DIFFERENT THINGS: NEARLY EVERY DAY
GAD7 TOTAL SCORE: 17
1. FEELING NERVOUS, ANXIOUS, OR ON EDGE: NEARLY EVERY DAY
4. TROUBLE RELAXING: NEARLY EVERY DAY
7. FEELING AFRAID AS IF SOMETHING AWFUL MIGHT HAPPEN: NEARLY EVERY DAY
GAD7 TOTAL SCORE: 17
7. FEELING AFRAID AS IF SOMETHING AWFUL MIGHT HAPPEN: NEARLY EVERY DAY
5. BEING SO RESTLESS THAT IT IS HARD TO SIT STILL: SEVERAL DAYS
6. BECOMING EASILY ANNOYED OR IRRITABLE: SEVERAL DAYS
8. IF YOU CHECKED OFF ANY PROBLEMS, HOW DIFFICULT HAVE THESE MADE IT FOR YOU TO DO YOUR WORK, TAKE CARE OF THINGS AT HOME, OR GET ALONG WITH OTHER PEOPLE?: VERY DIFFICULT
2. NOT BEING ABLE TO STOP OR CONTROL WORRYING: NEARLY EVERY DAY
GAD7 TOTAL SCORE: 17
IF YOU CHECKED OFF ANY PROBLEMS ON THIS QUESTIONNAIRE, HOW DIFFICULT HAVE THESE PROBLEMS MADE IT FOR YOU TO DO YOUR WORK, TAKE CARE OF THINGS AT HOME, OR GET ALONG WITH OTHER PEOPLE: VERY DIFFICULT

## 2024-02-06 NOTE — PROGRESS NOTES
"    [facility]      PATIENT'S NAME: Marielos Marti  PREFERRED NAME: Marielos  PRONOUNS:  she/her and they    MRN: [id number]  : 1975  ADDRESS: [address]  [place] [address]  ACCT. NUMBER:  [id number]  DATE OF SERVICE: 24  START TIME:  4:02 pm   END TIME: 4: 58 pm  PREFERRED PHONE: [phone]  May we leave a program related message: Yes  SERVICE MODALITY:  Video Visit:      Provider verified identity through the following two step process.  Patient provided:  Patient  and Patient address    Telemedicine Visit: The patient's condition can be safely assessed and treated via synchronous audio and visual telemedicine encounter.      Reason for Telemedicine Visit: Patient has requested telehealth visit    Originating Site (Patient Location): Patient's home    Distant Site (Provider Location): [facility]    Consent:  The patient/guardian has verbally consented to: the potential risks and benefits of telemedicine (video visit) versus in person care; bill my insurance or make self-payment for services provided; and responsibility for payment of non-covered services.     Patient would like the video invitation sent by:  My Chart    Mode of Communication:  Video Conference via [facility]    Distant Location (Provider):  On-site    As the provider I attest to compliance with applicable laws and regulations related to telemedicine.      UNIVERSAL ADULT Mental Health DIAGNOSTIC ASSESSMENT    Identifying Information:  Patient is a 48 year old,   individual.  Patient was referred for an assessment by primary care clinic.  Patient attended the session alone.    Chief Complaint:    The reason for seeking services at this time is: \"The anxiety...I wish I did not have so much anxiety\", \"I want to manage it (anxiety)...rather than lash out like my father did, or check out like my mother did\".   The problem(s) began 89.   Patient has attempted to resolve " "these concerns in the past through medication and therapy services.    Marielos reported in addition to her baseline anxiety, she noted low mood (\"down\", \"sad\"), fatigue (\"exhausted\"), anhedonia, amotivation (everything is \"a chore\"), poor sleep, change in appetite, guilt, and low concentration.  Marielos attributes depressive symptoms to season and increased workload.     Marielos reported history of anxiety and low mood since childhood, stating \"they (the anxiety and depression) have always gotten in the way\".  When describing them, she reported the anxiety and depression are closely linked, but \"its seems like anxiety is the bigger issue\" and the anxiety is often \"the  (of the bus)\".   Marielos believes she experienced depressive episodes in high school, but clearly recalled a depressive episode during her early 20's.  Marielos experienced her first panic attack at age 24 and her most recent occurred in November 2021 (preparing to move across the country).  Marielos reported panic attacks are triggered by very stressful incidents. Marielos reported she first started selective serotonin reuptake inhibitor at age 32 (attempted escitalopram, switched to and continues to take sertraline) and believes the medication is very helpful, stating \"the S.S.R.I's keep it in check (anxiety symptoms)\", explaining how prior to or when not taking medication the anxiety symptoms can \"spiral\", she can feel the symptoms increasing in her body, sometimes \"It feels like a knot\", and becomes overwhelming.      Marielos reported experiencing passive SI in 2012, denied ever having intent or plan.  Marielos reported she sought therapy (and was taking medication) and it was very helpful.  Marielos denied SI since.  Marielos denied SIB, SA, or psychiatric hospitalizations. Marielos denied any current safety concerns.  Marielos denied past/current problematic alcohol or substance use.     Social/Family History:  Patient reported they grew up in other [place]  They were raised by biological parents; grandmother; grandfather; aunt  .  Parents were always together.  Patient reported that their childhood was \"I didn't really fit in when growing up\", \"I was weird, quiet, small, very different from the other vilma kids\"..  Patient described their current relationships with family of origin as \"pretty good\".    Brother: , twin children, resides outside of [place], [place]     The patient describes their cultural background as .  Cultural influences and impact on patient's life structure, values, norms, and healthcare: [place] to feelings of isolation and otherness, especially since many people in the area were very conservative, including family members.  Contextual influences on patient's health include: Contextual Factors: Family Factors family history of mental health.    These factors will be addressed in the Preliminary Treatment plan. Patient identified their preferred language to be English. Patient reported they does not need the assistance of an  or other support involved in therapy.     Patient reported had no significant delays in developmental tasks.   Patient's highest education level was graduate school  .  Patient identified the following learning problems: none reported.  Modifications will not be used to assist communication in therapy.  Patient reports they are  able to understand written materials.    Patient reported the following relationship history: none.    Patient's current relationship status is has a partner or significant other for 15 years .   Patient identified their sexual orientation as heterosexual.  Patient reported having  no child(aj). Patient identified partner; mother; pets; friends; co-worker as part of their support system.  Patient identified the quality of these relationships as fair,  .      Patient's current living/housing situation involves staying " in own home/apartment.  The immediate members of family and household include Rony Stock, 48,partner and they report that housing is stable.    Patient is currently employed fulltime.  Patient reports their finances are obtained through employment. Patient does identify finances as a current stressor.      Patient reported that they have not been involved with the legal system. . Patient does not report being under probation/ parole/ jurisdiction. They are not under any current court jurisdiction. .    Patient's Strengths and Limitations:  Patient identified the following strengths or resources that will help them succeed in treatment: family support, insight, intelligence, sense of humor and work ethic. Things that may interfere with the patient's success in treatment include: few friends.     Assessments:  The following assessments were completed by patient for this visit:  PHQ9:       9/5/2023    10:03 AM 10/26/2023     1:08 PM 11/21/2023     1:12 PM 12/12/2023     4:00 PM 1/9/2024     4:27 PM 1/11/2024     4:01 PM 2/6/2024    12:50 PM   PHQ-9 SCORE   PHQ-9 Total Score MyChart 10 (Moderate depression) 12 (Moderate depression) 6 (Mild depression) 9 (Mild depression) 10 (Moderate depression) 11 (Moderate depression) 11 (Moderate depression)   PHQ-9 Total Score 10 12 6 9 10 11 11     GAD7:       8/14/2023    11:51 AM 9/5/2023    10:04 AM 10/26/2023     1:08 PM 11/21/2023     1:13 PM 12/12/2023     4:00 PM 1/9/2024     4:27 PM 2/6/2024    12:50 PM   MARY-7 SCORE   Total Score 17 (severe anxiety) 16 (severe anxiety) 17 (severe anxiety) 12 (moderate anxiety) 16 (severe anxiety) 14 (moderate anxiety) 17 (severe anxiety)   Total Score 17 16 17 12 16 14 17     CAGE-AID:       1/23/2023    11:06 AM 2/6/2024     5:10 PM   CAGE-AID Total Score   Total Score 0 0   Total Score MyChart 0 (A total score of 2 or greater is considered clinically significant)      PROMIS 10-Global Health (only subscores and total score):        1/25/2023    11:18 AM 5/11/2023     1:41 PM 8/15/2023    12:30 PM 11/21/2023     1:13 PM 2/6/2024    12:51 PM   PROMIS-10 Scores Only   Global Mental Health Score 10 9 9 9 8   Global Physical Health Score 13 15 15 15 13   PROMIS TOTAL - SUBSCORES 23 24 24 24 21       Personal and Family Medical History:  Patient does report a family history of mental health concerns; Paternal and maternal family history of mental health issues; father-anxiety, mother-depression. Patient reports family history includes Hypothyroidism in her brother and mother; Pulmonary Embolism in her father..     Patient does report Mental Health Diagnosis and/or Treatment.  Patient Patient reported the following previous diagnoses which include(s): depression .  Patient reported symptoms began during childhood.  Patient has received mental health services in the past:  therapy  .  Psychiatric Hospitalizations: none. Patient denies a history of civil commitment.  Currently, patient none  receiving other mental health services     Patient has had a physical exam to rule out medical causes for current symptoms.  Date of last physical exam was within the past year. Client was encouraged to follow up with PCP if symptoms were to develop. The patient has a [facility] Provider, who is named Geni Whalen.  Patient reports no current medical concerns.  Patient denies any issues with pain..   There are not significant appetite / nutritional concerns / weight changes.   Patient does not report a history of head injury / trauma / cognitive impairment.      Patient reports current meds as:   Outpatient Medications Marked as Taking for the 1/27/23 encounter (Virtual Visit) with Ullrich, Shawn G, LICSW   Medication Sig    sertraline (ZOLOFT) 100 MG tablet Take 1 tablet (100 mg) by mouth daily       Medication Adherence:  Patient reports   taking prescribed medications as prescribed.    Patient Allergies:    Allergies   Allergen Reactions     Seasonal Allergies        Medical History:    Past Medical History:   Diagnosis Date    Depression     Herpes simplex virus infection     genital    Seasonal allergic rhinitis          Current Mental Status Exam:   Appearance:  Appropriate    Eye Contact:  Good   Psychomotor:  Normal       Gait / station:  Unable to assess  Attitude / Demeanor: Cooperative   Speech      Rate / Production: Normal/ Responsive      Volume:  Normal  volume      Language:  intact  Mood:   Anxious  Depressed   Affect:   Worrisome    Thought Content: Clear   Thought Process: Coherent       Associations: No loosening of associations  Insight:   Good   Judgment:  Intact   Orientation:  All  Attention/concentration: Fair      Substance Use:  Patient did report a family history of substance use concerns; paternal uncle-alcohol; see medical history section for details.  Patient has not received chemical dependency treatment in the past.  Patient has not ever been to detox.      Patient is not currently receiving any chemical dependency treatment.         Substance History of use Age of first use Date of last use     Pattern and duration of use (include amounts and frequency)   Alcohol currently use   20 Feb 2023 3-4x's/week; 1-2 drinks per episode   Cannabis   currently use 20 Feb 2023 2-3x/week      Amphetamines   never used     REPORTS SUBSTANCE USE: N/A   Cocaine/crack    never used       REPORTS SUBSTANCE USE: N/A   Hallucinogens used in the past   23  01/01/11  REPORTS SUBSTANCE USE: N/A   Inhalants never used         REPORTS SUBSTANCE USE: N/A   Heroin never used         REPORTS SUBSTANCE USE: N/A   Other Opiates never used     REPORTS SUBSTANCE USE: N/A   Benzodiazepine   used in the past 39 10/15/22 None current   Barbiturates never used     REPORTS SUBSTANCE USE: N/A   Over the counter meds used in the past 20 01/12/23 To address ailment; no abuse   Caffeine currently use 18   2-3 cups per day   Nicotine  never used     REPORTS  SUBSTANCE USE: N/A   Other substances not listed above:  Identify:  never used     REPORTS SUBSTANCE USE: N/A     Patient reported the following problems as a result of their substance use: no problems, not applicable.    Substance Use: No symptoms    Based on the negative CAGE score and clinical interview there  are not indications of drug or alcohol abuse.      Significant Losses / Trauma / Abuse / Neglect Issues:   Patient did not  serve in the .  There are indications or report of significant loss, trauma, abuse or neglect issues related to: pt reported significant bullying during childhood.  Concerns for possible neglect are not present.    Safety Assessment:   Patient denies current homicidal ideation and behaviors.  Patient denies current self-injurious ideation and behaviors.    Patient denied risk behaviors associated with substance use.  Patient denies any high risk behaviors associated with mental health symptoms.  Patient reports the following current concerns for their personal safety: None.  Patient reports there are not firearms in the house.       History of Safety Concerns:  Patient denied a history of homicidal ideation.     Patient denied a history of personal safety concerns.    Patient denied a history of assaultive behaviors.    Patient denied a history of sexual assault behaviors.     Patient denied a history of risk behaviors associated with substance use.  Patient denies any history of high risk behaviors associated with mental health symptoms.  Patient reports the following protective factors: safe and stable environment; regular sleep; help seeking behaviors when distressed; adherence with prescribed medication; living with other people; sense of meaning; positive social skills; financial stability; access to a variety of clinical interventions and pets    Risk Plan:  See Recommendations for Safety and Risk Management Plan    Review of Symptoms per patient report:    Depression: Change in sleep, Lack of interest, Excessive or inappropriate guilt, Change in energy level, Difficulties concentrating, Change in appetite, Low self-worth, Ruminations, Irritability and Feeling sad, down, or depressed  Kimberly:  No Symptoms  Psychosis: No Symptoms  Anxiety: Excessive worry, Nervousness, Physical complaints, such as headaches, stomachaches, muscle tension, Sleep disturbance, Psychomotor agitation, Ruminations, Poor concentration and Irritability  Panic:  None recent  Post Traumatic Stress Disorder:  Experienced traumatic event see above   Eating Disorder: No Symptoms  ADD / ADHD:  No symptoms  Conduct Disorder: No symptoms  Autism Spectrum Disorder: No symptoms  Obsessive Compulsive Disorder: No Symptoms    Patient reports the following compulsive behaviors and treatment history: None.      Diagnostic Criteria:   Generalized Anxiety Disorder  A. Excessive anxiety and worry about a number of events or activities (such as work or school performance).   B. The person finds it difficult to control the worry.  C. Select 3 or more symptoms (required for diagnosis). Only one item is required in children.   - Restlessness or feeling keyed up or on edge.    - Being easily fatigued.    - Difficulty concentrating or mind going blank.    - Irritability.    - Muscle tension.    - Sleep disturbance (difficulty falling or staying asleep, or restless unsatisfying sleep).   D. The focus of the anxiety and worry is not confined to features of an Axis I disorder.  E. The anxiety, worry, or physical symptoms cause clinically significant distress or impairment in social, occupational, or other important areas of functioning.   F. The disturbance is not due to the direct physiological effects of a substance (e.g., a drug of abuse, a medication) or a general medical condition (e.g., hyperthyroidism) and does not occur exclusively during a Mood Disorder, a Psychotic Disorder, or a Pervasive Developmental  Disorder.    - The aformentioned symptoms began 30 year(s) ago and occurs 7 days per week and is experienced as moderate. Major Depressive Disorder  A) Recurrent episode(s) - symptoms have been present during the same 2-week period and represent a change from previous functioning 5 or more symptoms (required for diagnosis)   - Depressed mood. Note: In children and adolescents, can be irritable mood.     - Diminished interest or pleasure in all, or almost all, activities.    - Significant weight gaindecrease in appetite.    - Decreased sleep.    - Fatigue or loss of energy.    - Feelings of worthlessness or inappropriate and excessive guilt.    - Diminished ability to think or concentrate, or indecisiveness.   B) The symptoms cause clinically significant distress or impairment in social, occupational, or other important areas of functioning  C) The episode is not attributable to the physiological effects of a substance or to another medical condition  D) The occurence of major depressive episode is not better explained by other thought / psychotic disorders  E) There has never been a manic episode or hypomanic episode    Functional Status:  Patient reports the following functional impairments:  educational activities, health maintenance, home life with ,, management of the household and or completion of tasks, money management, social interactions and work / vocational responsibilities.     Nonprogrammatic care:  Patient is requesting basic services to address current mental health concerns.     Clinical Summary:  1. Reason for assessment: referred by PCP and self, due to anxiety and depression  2. Psychosocial, Cultural and Contextual Factors: Partnered, , Heterosexual, female; employed full-time; [place] .  3. Principal DSM5 Diagnoses  (Sustained by DSM5 Criteria Listed Above):   300.02 (F41.1) Generalized Anxiety Disorder.  4. Other Diagnoses that is relevant to services:   296.32 (F33.1) Major  Depressive Disorder, Recurrent Episode, Moderate With anxious distress.  5. Provisional Diagnosis: NA  6. Prognosis: Expect Improvement, Relieve Acute Symptoms and Maintain Current Status / Prevent Deterioration.  7. Likely consequences of symptoms if not treated: Continued/worsening symptoms and increased/prolonged functional impairment  8. Client strengths include:  educated, empathetic, employed, has a previous history of therapy, insightful, intelligent, support of family, friends and providers and work history .     Recommendations:     1. Plan for Safety and Risk Management:   Safety and Risk: Recommended that patient call 911 or go to the local ED should there be a change in any of these risk factors..          Report to child / adult protection services was NA.     2. Patient's identified mental health concerns with a cultural influence will be addressed by recognizing connection between the mind and body, and addressing whole person health.     3. Initial Treatment will focus on:    Depressed Mood - symptoms, warning signs, and coping strategies  Anxiety - symptoms, warning signs, and coping strategies.     4. Resources/Service Plan:    services are not indicated.   Modifications to assist communication are not indicated.   Additional disability accommodations are not indicated.      5. Collaboration:   Collaboration / coordination of treatment will be initiated with the following  support professionals: primary care physician.      6.  Referrals:   The following referral(s) will be initiated: [facility].       A Release of Information has been obtained for the following: NA.     Emergency Contact  was not obtained.      Clinical Substantiation/medical necessity for the above recommendations: Marielos endorsed symptoms of anxiety and depressive symptoms.  Marielos reported long history of anxiety and depressed mood, and strong family mental health history.   Marielos reported the  symptoms have negatively impacted functioning in multiple life areas, including educational activities, health maintenance, home life, management of the household and or completion of tasks, money management, social interactions and work / vocational responsibilities.   [facility] recommended pt continue with medication and participate in outpatient therapy services.        7. NEGRITO:    NEGRITO:  Discussed the general effects of drugs and alcohol on health and well-being. Provider educated patient about the effects of chemical use on their health and well being. Recommendations:  Reduce use and/or abstain from alcohol and cannabis use .     8. Records:   These were reviewed at time of assessment.   Information in this assessment was obtained from the medical record and provided by patient who is a good historian.   Patient will have open access to their mental health medical record.    9.   Interactive Complexity: No      Provider Name/ Credentials:  Shawn Ullrich LICSW, [facility]  February 6, 2024

## 2024-02-07 ENCOUNTER — TELEPHONE (OUTPATIENT)
Dept: DERMATOLOGY | Facility: CLINIC | Age: 49
End: 2024-02-07

## 2024-02-07 NOTE — TELEPHONE ENCOUNTER
Left Voicemail (1st Attempt) and Sent Mychart (1st Attempt) for the patient to call back and schedule the following:    Appointment type: New   Provider: Rey Jones  Return date: 2/7/24  Specialty phone number: [phone]

## 2024-03-04 ASSESSMENT — ANXIETY QUESTIONNAIRES
IF YOU CHECKED OFF ANY PROBLEMS ON THIS QUESTIONNAIRE, HOW DIFFICULT HAVE THESE PROBLEMS MADE IT FOR YOU TO DO YOUR WORK, TAKE CARE OF THINGS AT HOME, OR GET ALONG WITH OTHER PEOPLE: SOMEWHAT DIFFICULT
4. TROUBLE RELAXING: NEARLY EVERY DAY
2. NOT BEING ABLE TO STOP OR CONTROL WORRYING: NEARLY EVERY DAY
5. BEING SO RESTLESS THAT IT IS HARD TO SIT STILL: SEVERAL DAYS
GAD7 TOTAL SCORE: 16
GAD7 TOTAL SCORE: 16
7. FEELING AFRAID AS IF SOMETHING AWFUL MIGHT HAPPEN: MORE THAN HALF THE DAYS
GAD7 TOTAL SCORE: 16
7. FEELING AFRAID AS IF SOMETHING AWFUL MIGHT HAPPEN: MORE THAN HALF THE DAYS
3. WORRYING TOO MUCH ABOUT DIFFERENT THINGS: NEARLY EVERY DAY
8. IF YOU CHECKED OFF ANY PROBLEMS, HOW DIFFICULT HAVE THESE MADE IT FOR YOU TO DO YOUR WORK, TAKE CARE OF THINGS AT HOME, OR GET ALONG WITH OTHER PEOPLE?: SOMEWHAT DIFFICULT
6. BECOMING EASILY ANNOYED OR IRRITABLE: SEVERAL DAYS
1. FEELING NERVOUS, ANXIOUS, OR ON EDGE: NEARLY EVERY DAY

## 2024-03-04 NOTE — PROGRESS NOTES
[facility]      PATIENT'S NAME: Marielos Marti  PREFERRED NAME: Marielos  PRONOUNS:  she/her and they    MRN: [id number]  : 1975  ADDRESS: [address]  [facility] [address]  ACCT. NUMBER:  [id number]  DATE OF SERVICE: 3/05/24  PREFERRED PHONE: [phone]  May we leave a program related message: Yes  SERVICE MODALITY:  Video Visit:      Provider verified identity through the following two step process.  Patient provided:  Patient  and Patient address    Telemedicine Visit: The patient's condition can be safely assessed and treated via synchronous audio and visual telemedicine encounter.      Reason for Telemedicine Visit: Patient has requested telehealth visit    Originating Site (Patient Location): Patient's home    Distant Site (Provider Location): [facility]    Consent:  The patient/guardian has verbally consented to: the potential risks and benefits of telemedicine (video visit) versus in person care; bill my insurance or make self-payment for services provided; and responsibility for payment of non-covered services.     Patient would like the video invitation sent by:  My Chart    Mode of Communication:  [facility]    Distant Location (Provider):  On-site    As the provider I attest to compliance with applicable laws and regulations related to telemedicine.      Behavioral Health Clinician Progress Note    Patient Name: Marielos Marti           Service Type:  Individual      Service Location:   Telehealth; see above     Session Start Time: 4:01 pm  Session End Time: 4:55 pm      Session Length: 53 - 60      Attendees: Patient     Service Modality:  Telehealth    Visit Activities (Refresh list every visit): [facility] Only    Diagnostic Assessment Date: 24  Treatment Plan Review Date: 24  CGI Review Date: 24  Promis 10 Review Date:  24    Clinical Global Impressions  First:  Considering your total clinical experience  "with this particular patient population, how severe are the patient's symptoms at this time?: 4 (2/27/2024  4:00 PM)    Most recent:  Compared to the patient's condition at the START of treatment, this patient's condition is: 4 (2/27/2024  4:00 PM)      See Flowsheets for today's PHQ-9 and MARY-7 results  Previous PHQ-9:       1/11/2024     4:01 PM 2/6/2024    12:50 PM 3/4/2024     4:36 PM   PHQ-9 SCORE   PHQ-9 Total Score MyChart 11 (Moderate depression) 11 (Moderate depression) 9 (Mild depression)   PHQ-9 Total Score 11 11 9     Previous MARY-7:       1/9/2024     4:27 PM 2/6/2024    12:50 PM 3/4/2024     4:37 PM   MARY-7 SCORE   Total Score 14 (moderate anxiety) 17 (severe anxiety) 16 (severe anxiety)   Total Score 14 17 16       DATA  Extended Session (60+ minutes): No  Interactive Complexity: No  Crisis: No  [facility] Patient: No    Treatment Objective(s) Addressed in This Session:  Target Behavior(s):  increase socialization and activity level, and be more present    Depressed Mood: Increase interest, engagement, and pleasure in doing things  Decrease frequency and intensity of feeling down, depressed, hopeless  Feel less tired and more energy during the day   Identify negative self-talk and behaviors: challenge core beliefs, myths, and actions  Anxiety: will experience a reduction in anxiety, will develop more effective coping skills to manage anxiety symptoms, will develop healthy cognitive patterns and beliefs and will increase ability to function adaptively    Current Stressors / Issues:  Marielos presented with low and anxious mood, stating work continues to be busy, \"I'm pretty  drained...its been non-stop\".  Marielos reported her and her partner have been planning a trip to [facility] for April, sharing the upcoming vacation is important, because \"I really something need something to look forward to\".  Marielos reported the trip will be restorative because they will be going to new places (exciting), spending some down " "time at quiet place (self-care), and reconnecting with partner's long-time friends.  While Marielos shared about these positive developments, she noted accompanying worry, explaining her supervisor has not yet approved time off, even though she blocked out time last Fall.  [facility] validated thoughts and emotions, and then engaged Marielos in conversation about how to take effective action in order to secure time off.  Marielos replied \"I'm trying to navigate that and stand up for myself', and shared example of engaging in supervisor in professional manner and using assertive communication.      Lastly, [facility] and Marielos reviewed strategies she can engage in daily/weekly to manage stress and symptoms.  Marielos reported she has been socializing which is beneficial, and also being more physically active. Marielos reported she has been stretching, going yoga, and getting out the house for walks more often, which helps to reduce anxiety and overall the exercise has been a \"life saver\".         Progress on Treatment Objective(s) / Homework:  Minimal progress - ACTION (Actively working towards change); Intervened by reinforcing change plan / affirming steps taken    Motivational Interviewing    MI Intervention: Co-Developed Goal: Increase socialization and activity level; be more present, Expressed Empathy/Understanding, Supported Autonomy, Collaboration, Evocation, Open-ended questions, Reflections: simple and complex and Change talk (evoked)     Change Talk Expressed by the Patient: Desire to change Ability to change Reasons to change Need to change    Provider Response to Change Talk: E - Evoked more info from patient about behavior change, A - Affirmed patient's thoughts, decisions, or attempts at behavior change, R - Reflected patient's change talk and S - Summarized patient's change talk statements      Solution-Focused Therapy  Explore patterns in patient's behaviors and relationships and discuss options for new behaviors  Explore " new options for problem-solving, communication, managing stress, etc.    Care Plan review completed: Yes    Medication Review:  No changes to current psychiatric medication(s)    Medication Compliance:  Yes    Changes in Health Issues:   None reported    Chemical Use Review:   Substance Use: Chemical use reviewed, no active concerns identified      Tobacco Use: No current tobacco use.      Assessment: Current Emotional / Mental Status (status of significant symptoms):  Risk status (Self / Other harm or suicidal ideation)  Patient has had a history of suicidal ideation: passive, never intent or plan; no SIB, no SA; most recent passive SI was years ago  Patient denies current fears or concerns for personal safety.  Patient denies current or recent suicidal ideation or behaviors.  Patient denies current or recent homicidal ideation or behaviors.  Patient denies current or recent self injurious behavior or ideation.  Patient denies other safety concerns.  A safety and risk management plan has not been developed at this time, however patient was encouraged to call [place] [phone] should there be a change in any of these risk factors.    Appearance:   Appropriate   Eye Contact:   Good   Psychomotor Behavior: Normal   Attitude:   Cooperative   Orientation:   All  Speech   Rate / Production: Normal    Volume:  Normal   Mood:    Anxious , low, tired  Affect:    Congruent with mood   Thought Content:  Clear   Thought Form:  Coherent  Logical   Insight:    Good     Diagnoses:  1. Generalized anxiety disorder          Collateral Reports Completed:  Routed note to PCP    Plan: (Homework, other):  Patient was given information about behavioral services and encouraged to schedule a follow up appointment with the clinic [facility] in 1 month.  She was also given information about mental health symptoms and treatment options .  CD Recommendations: No indications of CD issues.   Shawn Ullrich LICSW,  "LADC      ______________________________________________________________________    Integrated Primary Care Behavioral Health Treatment Plan    Patient's Name: Marielos Marti  YOB: 1975    Date of Creation: 11/21/23  Date Treatment Plan Last Reviewed/Revised: 11/21/23    DSM5 Diagnoses: 296.32 (F33.1) Major Depressive Disorder, Recurrent Episode, Moderate _ or 300.02 (F41.1) Generalized Anxiety Disorder  Psychosocial / Contextual Factors: , Partnered, Female, heterosexual; full-time employment;   PROMIS (reviewed every 90 days): pt completed on 2/6/24    Referral / Collaboration:  Referral to another professional/service is not indicated at this time..    Anticipated number of session for this episode of care: 8  Anticipation frequency of session: Every other week  Anticipated Duration of each session: 38-52 minutes  Treatment plan will be reviewed in 90 days or when goals have been changed.       MeasurableTreatment Goal(s) related to diagnosis / functional impairment(s)  Goal 1: Patient will decrease anxiety and improve mood by developing local support network, effectively apply interpersonal skills, and consistently engage in self-care activities    I will know I've met my goal when :  \"finding my community in the [place]\"; feel \"connection\" to place and people/community      Objective #A (Patient Action)    Patient will attend and participate in social or recreational activities  per week to build connection and community .  Status: Continued - Date(s): 11/21/23    Intervention(s)  Therapist will assign homework to list activities and then schedule into weekly calendar    Objective #B (Patient Action)    Status: New - Date: 11/21/23    Patient will practice setting boundaries 3 times in the next 12 weeks.    Intervention(s)  Therapist will teach about healthy boundaries. With family members .       Objective #C   Patient will use at least one self-care strategy daily  Status: " New-Date:  11/21/23  Therapist will teach and then engage pt in creating list of self-care strategies        Patient has reviewed and agreed to the above plan.      Shawn G. Ullrich, [facility]  November 21, 2023

## 2024-03-05 ENCOUNTER — VIRTUAL VISIT (OUTPATIENT)
Dept: BEHAVIORAL HEALTH | Facility: CLINIC | Age: 49
End: 2024-03-05
Payer: COMMERCIAL

## 2024-03-05 DIAGNOSIS — F41.1 GENERALIZED ANXIETY DISORDER: Primary | ICD-10-CM

## 2024-04-01 ASSESSMENT — PATIENT HEALTH QUESTIONNAIRE - PHQ9
SUM OF ALL RESPONSES TO PHQ QUESTIONS 1-9: 13
SUM OF ALL RESPONSES TO PHQ QUESTIONS 1-9: 13
10. IF YOU CHECKED OFF ANY PROBLEMS, HOW DIFFICULT HAVE THESE PROBLEMS MADE IT FOR YOU TO DO YOUR WORK, TAKE CARE OF THINGS AT HOME, OR GET ALONG WITH OTHER PEOPLE: SOMEWHAT DIFFICULT

## 2024-04-01 ASSESSMENT — ANXIETY QUESTIONNAIRES
4. TROUBLE RELAXING: NEARLY EVERY DAY
GAD7 TOTAL SCORE: 17
1. FEELING NERVOUS, ANXIOUS, OR ON EDGE: NEARLY EVERY DAY
GAD7 TOTAL SCORE: 17
GAD7 TOTAL SCORE: 17
IF YOU CHECKED OFF ANY PROBLEMS ON THIS QUESTIONNAIRE, HOW DIFFICULT HAVE THESE PROBLEMS MADE IT FOR YOU TO DO YOUR WORK, TAKE CARE OF THINGS AT HOME, OR GET ALONG WITH OTHER PEOPLE: SOMEWHAT DIFFICULT
7. FEELING AFRAID AS IF SOMETHING AWFUL MIGHT HAPPEN: NEARLY EVERY DAY
8. IF YOU CHECKED OFF ANY PROBLEMS, HOW DIFFICULT HAVE THESE MADE IT FOR YOU TO DO YOUR WORK, TAKE CARE OF THINGS AT HOME, OR GET ALONG WITH OTHER PEOPLE?: SOMEWHAT DIFFICULT
6. BECOMING EASILY ANNOYED OR IRRITABLE: SEVERAL DAYS
2. NOT BEING ABLE TO STOP OR CONTROL WORRYING: NEARLY EVERY DAY
7. FEELING AFRAID AS IF SOMETHING AWFUL MIGHT HAPPEN: NEARLY EVERY DAY
3. WORRYING TOO MUCH ABOUT DIFFERENT THINGS: NEARLY EVERY DAY
5. BEING SO RESTLESS THAT IT IS HARD TO SIT STILL: SEVERAL DAYS

## 2024-04-02 ENCOUNTER — VIRTUAL VISIT (OUTPATIENT)
Dept: BEHAVIORAL HEALTH | Facility: CLINIC | Age: 49
End: 2024-04-02
Payer: COMMERCIAL

## 2024-04-02 DIAGNOSIS — F41.1 GENERALIZED ANXIETY DISORDER: Primary | ICD-10-CM

## 2024-04-02 DIAGNOSIS — F33.1 MAJOR DEPRESSIVE DISORDER, RECURRENT EPISODE, MODERATE (H): ICD-10-CM

## 2024-04-02 NOTE — PROGRESS NOTES
[facility]      PATIENT'S NAME: Marielos Marti  PREFERRED NAME: Marielos  PRONOUNS:  she/her and they    MRN: [id number]  : 1975  ADDRESS: [address]  [facility] [address]  ACCT. NUMBER:  [id number]  DATE OF SERVICE: 24  PREFERRED PHONE: [phone]  May we leave a program related message: Yes  SERVICE MODALITY:  Video Visit:      Provider verified identity through the following two step process.  Patient provided:  Patient  and Patient address    Telemedicine Visit: The patient's condition can be safely assessed and treated via synchronous audio and visual telemedicine encounter.      Reason for Telemedicine Visit: Patient has requested telehealth visit    Originating Site (Patient Location): Patient's home    Distant Site (Provider Location): [facility]    Consent:  The patient/guardian has verbally consented to: the potential risks and benefits of telemedicine (video visit) versus in person care; bill my insurance or make self-payment for services provided; and responsibility for payment of non-covered services.     Patient would like the video invitation sent by:  My Chart    Mode of Communication:  Video Conference via [facility]    Distant Location (Provider):  On-site    As the provider I attest to compliance with applicable laws and regulations related to telemedicine.      Behavioral Health Clinician Progress Note    Patient Name: Marielos Marti           Service Type:  Individual      Service Location:   Telehealth; see above     Session Start Time: 4:01 pm  Session End Time: 4:53 pm      Session Length: 38 - 52      Attendees: Patient     Service Modality:  Telehealth    Visit Activities (Refresh list every visit): [facility] Only    Diagnostic Assessment Date: 24  Treatment Plan Review Date: 24  CGI Review Date: 24  Promis 10 Review Date:  24    Clinical Global Impressions  First:  Considering your total clinical experience  "with this particular patient population, how severe are the patient's symptoms at this time?: 4 (2/27/2024  4:00 PM)    Most recent:  Compared to the patient's condition at the START of treatment, this patient's condition is: 4 (2/27/2024  4:00 PM)      See Flowsheets for today's PHQ-9 and MARY-7 results  Previous PHQ-9:       2/6/2024    12:50 PM 3/4/2024     4:36 PM 4/1/2024    10:20 AM   PHQ-9 SCORE   PHQ-9 Total Score MyChart 11 (Moderate depression) 9 (Mild depression) 13 (Moderate depression)   PHQ-9 Total Score 11 9 13     Previous MARY-7:       2/6/2024    12:50 PM 3/4/2024     4:37 PM 4/1/2024    10:20 AM   MARY-7 SCORE   Total Score 17 (severe anxiety) 16 (severe anxiety) 17 (severe anxiety)   Total Score 17 16 17       DATA  Extended Session (60+ minutes): No  Interactive Complexity: No  Crisis: No  [facility] Patient: No    Treatment Objective(s) Addressed in This Session:  Target Behavior(s):  increase socialization and activity level, and be more present    Depressed Mood: Increase interest, engagement, and pleasure in doing things  Decrease frequency and intensity of feeling down, depressed, hopeless  Feel less tired and more energy during the day   Identify negative self-talk and behaviors: challenge core beliefs, myths, and actions  Anxiety: will experience a reduction in anxiety, will develop more effective coping skills to manage anxiety symptoms, will develop healthy cognitive patterns and beliefs and will increase ability to function adaptively    Current Stressors / Issues:  [employer] developed discrepancy by reflecting how her long needed vacation is close approaching, yet her PHQ-9 and MARY-7 have either stayed the same and even increased. Marielos reported her anxiety has \"spiked\" because her supervisor moved up a bunch of deadlines before she leaves for vacation and the topic of finances has resurfaced at home as they plan for the trip.  [employer] validated the impact of these stressors and the lack of excitement, " "and also reflected how her work prioritizes productivity over self care/health.  Marielos replied she feels \"punished\" at work every time she takes vacation,and top of it, because there is so much work in such a short amount of time the quality of her work has suffered, which makes her feel even worse.  [facility] and Marielos then processed the topic of money within her relationship with her long-term partner.  Marielos noted this is a triggering topic for her partner, but she also feels guilt and that her self-worth is often connected to how much money she has or is earning. Marielos also shared how her partner's lack of engaging with her around finances communicates a lack of commitment to their relationship.  After providing space to express herself and process the emotions and thoughts resulting from the aforementioned issues, [facility] used MI interventions to focus on immediate strategies that will help to ensure her vacation is enjoyable and restorative. Marielos stated, she needs to 1. Get all the work done before she leaves (so she doesn't worry about work while on vacation), 2. Talk to Jude about the finances before the trip.        Progress on Treatment Objective(s) / Homework:  Worsening - PREPARATION (Decided to change - considering how); Intervened by negotiating a change plan and determining options / strategies for behavior change, identifying triggers, exploring social supports, and working towards setting a date to begin behavior change    Motivational Interviewing    MI Intervention: Co-Developed Goal: Increase socialization and activity level; be more present, Expressed Empathy/Understanding, Supported Autonomy, Collaboration, Evocation, Open-ended questions, Reflections: simple and complex and Change talk (evoked)     Change Talk Expressed by the Patient: Desire to change Ability to change Reasons to change Need to change    Provider Response to Change Talk: E - Evoked more info from patient about behavior change, A " - Affirmed patient's thoughts, decisions, or attempts at behavior change, R - Reflected patient's change talk and S - Summarized patient's change talk statements    Psychodynamic psychotherapy  Discuss patient's emotional dynamics and issues and how they impact behaviors  Explore patient's history of relationships and how they impact present behaviors  Explore how to work with and make changes in these schemas and patterns    Care Plan review completed: Yes    Medication Review:  No changes to current psychiatric medication(s)    Medication Compliance:  Yes    Changes in Health Issues:   None reported    Chemical Use Review:   Substance Use: Chemical use reviewed, no active concerns identified      Tobacco Use: No current tobacco use.      Assessment: Current Emotional / Mental Status (status of significant symptoms):  Risk status (Self / Other harm or suicidal ideation)  Patient has had a history of suicidal ideation: passive, never intent or plan; no SIB, no SA; most recent passive SI was years ago  Patient denies current fears or concerns for personal safety.  Patient denies current or recent suicidal ideation or behaviors.  Patient denies current or recent homicidal ideation or behaviors.  Patient denies current or recent self injurious behavior or ideation.  Patient denies other safety concerns.  A safety and risk management plan has not been developed at this time, however patient was encouraged to call [place] [phone] should there be a change in any of these risk factors.    Appearance:   Appropriate   Eye Contact:   Good   Psychomotor Behavior: Normal   Attitude:   Cooperative   Orientation:   All  Speech   Rate / Production: Normal    Volume:  Normal   Mood:    Anxious , depressed, sad  Affect:    Worrisome   Thought Content:  Clear   Thought Form:  Coherent  Logical   Insight:    Good     Diagnoses:  1. Generalized anxiety disorder    2. Major depressive disorder, recurrent episode, moderate (H)   "      Collateral Reports Completed:  Routed note to PCP    Plan: (Homework, other):  Patient was given information about behavioral services and encouraged to schedule a follow up appointment with the clinic [facility] in 1 month.  She was also given information about mental health symptoms and treatment options .  CD Recommendations: No indications of CD issues.   Shawn Ullrich [facility]      ______________________________________________________________________    Integrated Primary Care Behavioral Health Treatment Plan    Patient's Name: Marielos Marti  YOB: 1975    Date of Creation: 11/21/23  Date Treatment Plan Last Reviewed/Revised: 11/21/23    DSM5 Diagnoses: 296.32 (F33.1) Major Depressive Disorder, Recurrent Episode, Moderate _ or 300.02 (F41.1) Generalized Anxiety Disorder  Psychosocial / Contextual Factors: , Partnered, Female, heterosexual; full-time employment;   PROMIS (reviewed every 90 days): pt completed on 2/6/24    Referral / Collaboration:  Referral to another professional/service is not indicated at this time..    Anticipated number of session for this episode of care: 8  Anticipation frequency of session: Every other week  Anticipated Duration of each session: 38-52 minutes  Treatment plan will be reviewed in 90 days or when goals have been changed.       MeasurableTreatment Goal(s) related to diagnosis / functional impairment(s)  Goal 1: Patient will decrease anxiety and improve mood by developing local support network, effectively apply interpersonal skills, and consistently engage in self-care activities    I will know I've met my goal when :  \"finding my community in the [place]\"; feel \"connection\" to place and people/community      Objective #A (Patient Action)    Patient will attend and participate in social or recreational activities  per week to build connection and community .  Status: Continued - Date(s): 11/21/23    Intervention(s)  Therapist will assign " homework to list activities and then schedule into weekly calendar    Objective #B (Patient Action)    Status: New - Date: 11/21/23    Patient will practice setting boundaries 3 times in the next 12 weeks.    Intervention(s)  Therapist will teach about healthy boundaries. With family members .       Objective #C   Patient will use at least one self-care strategy daily  Status: New-Date:  11/21/23  Therapist will teach and then engage pt in creating list of self-care strategies        Patient has reviewed and agreed to the above plan.      Shawn G. Ullrich, [facility]  November 21, 2023

## 2024-05-06 ASSESSMENT — ANXIETY QUESTIONNAIRES
4. TROUBLE RELAXING: SEVERAL DAYS
6. BECOMING EASILY ANNOYED OR IRRITABLE: NOT AT ALL
GAD7 TOTAL SCORE: 9
7. FEELING AFRAID AS IF SOMETHING AWFUL MIGHT HAPPEN: MORE THAN HALF THE DAYS
1. FEELING NERVOUS, ANXIOUS, OR ON EDGE: MORE THAN HALF THE DAYS
5. BEING SO RESTLESS THAT IT IS HARD TO SIT STILL: NOT AT ALL
3. WORRYING TOO MUCH ABOUT DIFFERENT THINGS: MORE THAN HALF THE DAYS
8. IF YOU CHECKED OFF ANY PROBLEMS, HOW DIFFICULT HAVE THESE MADE IT FOR YOU TO DO YOUR WORK, TAKE CARE OF THINGS AT HOME, OR GET ALONG WITH OTHER PEOPLE?: SOMEWHAT DIFFICULT
7. FEELING AFRAID AS IF SOMETHING AWFUL MIGHT HAPPEN: MORE THAN HALF THE DAYS
GAD7 TOTAL SCORE: 9
IF YOU CHECKED OFF ANY PROBLEMS ON THIS QUESTIONNAIRE, HOW DIFFICULT HAVE THESE PROBLEMS MADE IT FOR YOU TO DO YOUR WORK, TAKE CARE OF THINGS AT HOME, OR GET ALONG WITH OTHER PEOPLE: SOMEWHAT DIFFICULT
GAD7 TOTAL SCORE: 9
2. NOT BEING ABLE TO STOP OR CONTROL WORRYING: MORE THAN HALF THE DAYS

## 2024-05-06 ASSESSMENT — PATIENT HEALTH QUESTIONNAIRE - PHQ9
SUM OF ALL RESPONSES TO PHQ QUESTIONS 1-9: 7
SUM OF ALL RESPONSES TO PHQ QUESTIONS 1-9: 7
10. IF YOU CHECKED OFF ANY PROBLEMS, HOW DIFFICULT HAVE THESE PROBLEMS MADE IT FOR YOU TO DO YOUR WORK, TAKE CARE OF THINGS AT HOME, OR GET ALONG WITH OTHER PEOPLE: SOMEWHAT DIFFICULT

## 2024-05-06 NOTE — PROGRESS NOTES
[facility]      PATIENT'S NAME: Marielos Marti  PREFERRED NAME: Marielos  PRONOUNS:  she/her and they    MRN: [id number]  : 1975  ADDRESS: [address]  [facility] [address]  ACCT. NUMBER:  [id number]  DATE OF SERVICE: 24  PREFERRED PHONE: [phone]  May we leave a program related message: Yes  SERVICE MODALITY:  Video Visit:      Provider verified identity through the following two step process.  Patient provided:  Patient  and Patient address    Telemedicine Visit: The patient's condition can be safely assessed and treated via synchronous audio and visual telemedicine encounter.      Reason for Telemedicine Visit: Patient has requested telehealth visit    Originating Site (Patient Location): Patient's home    Distant Site (Provider Location): [facility]    Consent:  The patient/guardian has verbally consented to: the potential risks and benefits of telemedicine (video visit) versus in person care; bill my insurance or make self-payment for services provided; and responsibility for payment of non-covered services.     Patient would like the video invitation sent by:  My Chart    Mode of Communication:  [facility]    Distant Location (Provider):  On-site    As the provider I attest to compliance with applicable laws and regulations related to telemedicine.      Behavioral Health Clinician Progress Note    Patient Name: Marielos Marti           Service Type:  Individual      Service Location:   Telehealth; see above     Session Start Time: 4:03 pm  Session End Time: 4:54 pm      Session Length: 38 - 52      Attendees: Patient     Service Modality:  Telehealth    Visit Activities (Refresh list every visit): [facility] Only    Diagnostic Assessment Date: 24  Treatment Plan Review Date: 24  CGI Review Date: 24  Promis 10 Review Date:  24    Clinical Global Impressions  First:  Considering your total clinical experience  "with this particular patient population, how severe are the patient's symptoms at this time?: 4 (2/27/2024  4:00 PM)    Most recent:  Compared to the patient's condition at the START of treatment, this patient's condition is: 4 (2/27/2024  4:00 PM)      See Flowsheets for today's PHQ-9 and MARY-7 results  Previous PHQ-9:       3/4/2024     4:36 PM 4/1/2024    10:20 AM 5/6/2024    10:57 AM   PHQ-9 SCORE   PHQ-9 Total Score MyChart 9 (Mild depression) 13 (Moderate depression) 7 (Mild depression)   PHQ-9 Total Score 9 13 7     Previous MARY-7:       3/4/2024     4:37 PM 4/1/2024    10:20 AM 5/6/2024    10:57 AM   MARY-7 SCORE   Total Score 16 (severe anxiety) 17 (severe anxiety) 9 (mild anxiety)   Total Score 16 17 9       DATA  Extended Session (60+ minutes): No  Interactive Complexity: No  Crisis: No  [facility] Patient: No    Treatment Objective(s) Addressed in This Session:  Target Behavior(s):  increase socialization and activity level, and be more present    Depressed Mood: Increase interest, engagement, and pleasure in doing things  Decrease frequency and intensity of feeling down, depressed, hopeless  Feel less tired and more energy during the day   Identify negative self-talk and behaviors: challenge core beliefs, myths, and actions  Anxiety: will experience a reduction in anxiety, will develop more effective coping skills to manage anxiety symptoms, will develop healthy cognitive patterns and beliefs and will increase ability to function adaptively    Current Stressors / Issues:    Marielos reported the trip to [facility] was a very positive experience, citing increased activity level, socialization, and new experiences.  [facility] and Marielos processed the trip by identifying impact of trip and how to apply lessons to daily life.  Marielos shared several important realizations, including she's \"grateful\" for her partner and \"feeling more secure\" in her relationship, noting they enjoyed each other's company, they were supportive of " "each other, and were able to reconnect.  Marielos also stated, \"I'm feeling really buoyed by all of this social stuff\", explaining, \"I had gotten into a rut (isolating at home)....and that feeds into my self worth...I need that (socialization)....I need to find a way to keep it going...That's my goal for this summer...to see people\".  Marielos also reported they want to go back, so booking another trip will provide something to look forward to and work towards, including taking language classes in preparation for next trip.  Finally, Marielos stated she feels more balanced, noting she needs to be more intentional about planning time away from work and with partner in the future.      Progress on Treatment Objective(s) / Homework:  Satisfactory progress - ACTION (Actively working towards change); Intervened by reinforcing change plan / affirming steps taken    Motivational Interviewing    MI Intervention: Co-Developed Goal: Increase socialization and activity level; be more present, Expressed Empathy/Understanding, Supported Autonomy, Collaboration, Evocation, Open-ended questions, Reflections: simple and complex and Change talk (evoked)     Change Talk Expressed by the Patient: Desire to change Ability to change Reasons to change Need to change    Provider Response to Change Talk: E - Evoked more info from patient about behavior change, A - Affirmed patient's thoughts, decisions, or attempts at behavior change, R - Reflected patient's change talk and S - Summarized patient's change talk statements    Psychodynamic psychotherapy  Discuss patient's emotional dynamics and issues and how they impact behaviors  Explore patient's history of relationships and how they impact present behaviors  Explore how to work with and make changes in these schemas and patterns    Care Plan review completed: Yes    Medication Review:  No changes to current psychiatric medication(s)    Medication Compliance:  Yes    Changes in Health " Issues:   None reported    Chemical Use Review:   Substance Use: Chemical use reviewed, no active concerns identified      Tobacco Use: No current tobacco use.      Assessment: Current Emotional / Mental Status (status of significant symptoms):  Risk status (Self / Other harm or suicidal ideation)  Patient has had a history of suicidal ideation: passive, never intent or plan; no SIB, no SA; most recent passive SI was years ago  Patient denies current fears or concerns for personal safety.  Patient denies current or recent suicidal ideation or behaviors.  Patient denies current or recent homicidal ideation or behaviors.  Patient denies current or recent self injurious behavior or ideation.  Patient denies other safety concerns.  A safety and risk management plan has not been developed at this time, however patient was encouraged to call [place] [phone] should there be a change in any of these risk factors.    Appearance:   Appropriate   Eye Contact:   Good   Psychomotor Behavior: Normal   Attitude:   Cooperative   Orientation:   All  Speech   Rate / Production: Normal    Volume:  Normal   Mood:    Anxious ,   Affect:    Congruent with mood   Thought Content:  Clear   Thought Form:  Coherent  Logical   Insight:    Good     Diagnoses:  1. Generalized anxiety disorder        Collateral Reports Completed:  Routed note to PCP    Plan: (Homework, other):  Patient was given information about behavioral services and encouraged to schedule a follow up appointment with the clinic [facility] in 1 month.  She was also given information about mental health symptoms and treatment options .  CD Recommendations: No indications of CD issues.   Shawn Ullrich LICSW, [facility]      ______________________________________________________________________    Integrated Primary Care Behavioral Health Treatment Plan    Patient's Name: Marielos Marti  YOB: 1975    Date of Creation: 11/21/23  Date Treatment Plan Last Reviewed/Revised:  "11/21/23    DSM5 Diagnoses: 296.32 (F33.1) Major Depressive Disorder, Recurrent Episode, Moderate _ or 300.02 (F41.1) Generalized Anxiety Disorder  Psychosocial / Contextual Factors: , Partnered, Female, heterosexual; full-time employment;   PROMIS (reviewed every 90 days): pt completed on 2/6/24    Referral / Collaboration:  Referral to another professional/service is not indicated at this time..    Anticipated number of session for this episode of care: 8  Anticipation frequency of session: Every other week  Anticipated Duration of each session: 38-52 minutes  Treatment plan will be reviewed in 90 days or when goals have been changed.       MeasurableTreatment Goal(s) related to diagnosis / functional impairment(s)  Goal 1: Patient will decrease anxiety and improve mood by developing local support network, effectively apply interpersonal skills, and consistently engage in self-care activities    I will know I've met my goal when :  \"finding my community in the [place]\"; feel \"connection\" to place and people/community      Objective #A (Patient Action)    Patient will attend and participate in social or recreational activities  per week to build connection and community .  Status: Continued - Date(s): 11/21/23    Intervention(s)  Therapist will assign homework to list activities and then schedule into weekly calendar    Objective #B (Patient Action)    Status: New - Date: 11/21/23    Patient will practice setting boundaries 3 times in the next 12 weeks.    Intervention(s)  Therapist will teach about healthy boundaries. With family members .       Objective #C   Patient will use at least one self-care strategy daily  Status: New-Date:  11/21/23  Therapist will teach and then engage pt in creating list of self-care strategies        Patient has reviewed and agreed to the above plan.      Shawn G. Ullrich, [facility]  November 21, 2023       "

## 2024-05-07 ENCOUNTER — VIRTUAL VISIT (OUTPATIENT)
Dept: BEHAVIORAL HEALTH | Facility: CLINIC | Age: 49
End: 2024-05-07
Payer: COMMERCIAL

## 2024-05-07 DIAGNOSIS — F41.1 GENERALIZED ANXIETY DISORDER: Primary | ICD-10-CM

## 2024-05-19 ENCOUNTER — HEALTH MAINTENANCE LETTER (OUTPATIENT)
Age: 49
End: 2024-05-19

## 2024-05-22 ENCOUNTER — TELEPHONE (OUTPATIENT)
Dept: DERMATOLOGY | Facility: CLINIC | Age: 49
End: 2024-05-22
Payer: COMMERCIAL

## 2024-05-22 NOTE — TELEPHONE ENCOUNTER
"Dr Tom Matthews is unfornately unavailable on 08/29/2024. Your appointment will need to be rescheduled. We apologize for the inconvenience.    Please call [phone] to schedule the visit(s).      (If you prefer, some clinics allow you to schedule at [email or web address]. Click the \"Visits\" tab, then choose \"Schedule an Appointment.\")        Sincerely, [facility]    "

## 2024-06-21 NOTE — PROGRESS NOTES
[facility]      PATIENT'S NAME: Marielos Marti  PREFERRED NAME: Marielos  PRONOUNS:  she/her    MRN: [id number]  : 1975  ADDRESS: [address]  [facility] [address]  ACCT. NUMBER:  [id number]  DATE OF SERVICE: 24  PREFERRED PHONE: [phone]  May we leave a program related message: Yes  SERVICE MODALITY:  Video Visit:      Provider verified identity through the following two step process.  Patient provided:  Patient  and Patient address    Telemedicine Visit: The patient's condition can be safely assessed and treated via synchronous audio and visual telemedicine encounter.      Reason for Telemedicine Visit: Patient has requested telehealth visit    Originating Site (Patient Location): Patient's home    Distant Site (Provider Location): [facility]    Consent:  The patient/guardian has verbally consented to: the potential risks and benefits of telemedicine (video visit) versus in person care; bill my insurance or make self-payment for services provided; and responsibility for payment of non-covered services.     Patient would like the video invitation sent by:  My Chart    Mode of Communication:  Video Conference via [facility]    Distant Location (Provider):  On-site    As the provider I attest to compliance with applicable laws and regulations related to telemedicine.      Behavioral Health Clinician Progress Note    Patient Name: Marielos Marti           Service Type:  Individual      Service Location:   Telehealth; see above     Session Start Time: 12:31 pm  Session End Time:  1:25 pm      Session Length: 53 - 60      Attendees: Patient     Service Modality:  Telehealth    Visit Activities (Refresh list every visit): [facility] Only    Diagnostic Assessment Date: 24  Treatment Plan Review Date: 24  CGI Review Date: 24  Promis 10 Review Date:  24    Clinical Global Impressions  First:  Considering your total clinical experience with  "this particular patient population, how severe are the patient's symptoms at this time?: 4 (6/25/2024 12:30 PM)    Most recent:  Compared to the patient's condition at the START of treatment, this patient's condition is: 3 (6/25/2024 12:30 PM)      See Flowsheets for today's PHQ-9 and MARY-7 results  Previous PHQ-9:       4/1/2024    10:20 AM 5/6/2024    10:57 AM 6/24/2024     9:25 AM   PHQ-9 SCORE   PHQ-9 Total Score MyChart 13 (Moderate depression) 7 (Mild depression) 9 (Mild depression)   PHQ-9 Total Score 13 7 9     Previous MARY-7:       4/1/2024    10:20 AM 5/6/2024    10:57 AM 6/24/2024     9:25 AM   MARY-7 SCORE   Total Score 17 (severe anxiety) 9 (mild anxiety) 15 (severe anxiety)   Total Score 17 9 15       DATA  Extended Session (60+ minutes): No  Interactive Complexity: No  Crisis: No  [facility] Patient: No    Treatment Objective(s) Addressed in This Session:  Target Behavior(s):  increase socialization and activity level, and be more present    Depressed Mood: Increase interest, engagement, and pleasure in doing things  Decrease frequency and intensity of feeling down, depressed, hopeless  Feel less tired and more energy during the day   Identify negative self-talk and behaviors: challenge core beliefs, myths, and actions  Anxiety: will experience a reduction in anxiety, will develop more effective coping skills to manage anxiety symptoms, will develop healthy cognitive patterns and beliefs and will increase ability to function adaptively    Current Stressors / Issues:    Marielos presented with anxious and low mood, stating, \"its been rough lately with my job and trying to balance my finances\" and she's trying to encourage her to have knee surgery (Marielos would spend time at mother's to help with recovery).  [facility] provided support and then processed work stress with Marielos by helping her to identify thoughts and emotions.  Marielos responded by stating, \"I feel pretty out of control with my job\", \"out of balance\", " "there's a \"lack of boundaries\", its frustrating and disheartening, and it reflects a lack of power.  Marielos stated when she feels this way its hard to start her workday because she's having thoughts of \"I don't want to do this\".   After expressing these thoughts and emotions, Marielos was receptive to problem solving work challenges, and stated, \"I'm going to keep having productive conversations with my boss\" and that's he's receptive to these conversations.  [facility] and Marielos then stepped back and identified strategies used in personal life, Marielos stating, \"I'm starting to find social connections outside of work that are fulfilling\", she's trying to reconnect with people over the past month, she's finding ways to live her values, and she will  \"keep prioritizing my health and my comfort at work because no one else is gonna\".      [facility] and Marielos will follow up in two months ([facility] will be out of the clinic for approximately 1.5 months).    Progress on Treatment Objective(s) / Homework:  Worsening - ACTION (Actively working towards change); Intervened by reinforcing change plan / affirming steps taken    Motivational Interviewing    MI Intervention: Co-Developed Goal: Increase socialization and activity level; be more present, Expressed Empathy/Understanding, Supported Autonomy, Collaboration, Evocation, Open-ended questions, Reflections: simple and complex and Change talk (evoked)     Change Talk Expressed by the Patient: Desire to change Ability to change Reasons to change Need to change    Provider Response to Change Talk: E - Evoked more info from patient about behavior change, A - Affirmed patient's thoughts, decisions, or attempts at behavior change, R - Reflected patient's change talk and S - Summarized patient's change talk statements    Psychodynamic psychotherapy  Discuss patient's emotional dynamics and issues and how they impact behaviors  Explore patient's history of relationships and how they impact present " behaviors  Explore how to work with and make changes in these schemas and patterns    Solution-Focused Therapy  Explore patterns in patient's behaviors and relationships and discuss options for new behaviors  Explore new options for problem-solving, communication, managing stress, etc.      Care Plan review completed: Yes    Medication Review:  No changes to current psychiatric medication(s)    Medication Compliance:  Yes    Changes in Health Issues:   None reported    Chemical Use Review:   Substance Use: Chemical use reviewed, no active concerns identified      Tobacco Use: No current tobacco use.      Assessment: Current Emotional / Mental Status (status of significant symptoms):  Risk status (Self / Other harm or suicidal ideation)  Patient has had a history of suicidal ideation: passive, never intent or plan; no SIB, no SA; most recent passive SI was years ago  Patient denies current fears or concerns for personal safety.  Patient denies current or recent suicidal ideation or behaviors.  Patient denies current or recent homicidal ideation or behaviors.  Patient denies current or recent self injurious behavior or ideation.  Patient denies other safety concerns.  A safety and risk management plan has not been developed at this time, however patient was encouraged to call [place] [phone] should there be a change in any of these risk factors.    Appearance:   Appropriate   Eye Contact:   Good   Psychomotor Behavior: Normal   Attitude:   Cooperative   Orientation:   All  Speech   Rate / Production: Normal    Volume:  Normal   Mood:    Anxious , low  Affect:    Worrisome   Thought Content:  Clear   Thought Form:  Coherent  Logical   Insight:    Good     Diagnoses:  1. Generalized anxiety disorder          Collateral Reports Completed:  Routed note to PCP    Plan: (Homework, other):  Patient was given information about behavioral services and encouraged to schedule a follow up appointment with the clinic [facility]  2  months .  She was also given information about mental health symptoms and treatment options .  CD Recommendations: No indications of CD issues.   Shawn Ullrich [facility], [facility]      ______________________________________________________________________    Integrated Primary Care Behavioral Health Treatment Plan    Patient's Name: Marielos Marti  YOB: 1975    Date of Creation: 11/21/23   Date Treatment Plan Last Reviewed/Revised: 6/25/24    DSM5 Diagnoses: 296.32 (F33.1) Major Depressive Disorder, Recurrent Episode, Moderate _ or 300.02 (F41.1) Generalized Anxiety Disorder  Psychosocial / Contextual Factors: , Partnered, Female, heterosexual; full-time employment;   PROMIS (reviewed every 90 days): pt completed on 5/6/24    Cultural Influences that will aid in the patient's recovery and enhance resiliency: Pt is from [place] [facility], family resides on [place]--support maintaining family connection    Considerations specific to safety concerns (eg suicidal, homicidal risks): none     How are Patient's natural supports included in treatment:   pt declined to include supports in treatment planning     Coordination of Care:   coordinate care with PCP     Review and Revisions of the Treatment Plan (every 180 days):   Treatment plan reviewed with patient; Treatment plan remains current based on the patient's status and progress to date         Referral / Collaboration:  Referral to another professional/service is not indicated at this time..    Anticipated number of session for this episode of care: 8  Anticipation frequency of session: Every other week  Anticipated Duration of each session: 38-52 minutes  Treatment plan will be reviewed in 90 days or when goals have been changed.       MeasurableTreatment Goal(s) related to diagnosis / functional impairment(s)  Goal 1: Patient will decrease anxiety and improve mood by developing local support network, effectively apply interpersonal skills, and  "consistently engage in self-care activities    I will know I've met my goal when :  \"finding my community in the [place] [employer]\"; feel \"connection\" to place and people/community      Objective #A (Patient Action)    Patient will attend and participate in social or recreational activities  per week to build connection and community .  Status: Continued - Date(s): 11/21/23    Intervention(s)  Therapist will assign homework to list activities and then schedule into weekly calendar    Objective #B (Patient Action)    Status: Continued - Date(s):6/25/24   Patient will practice setting boundaries 3 times in the next 12 weeks.    Intervention(s)  Therapist will teach about healthy boundaries. With family members .       Objective #C   Patient will use at least one self-care strategy daily  Status: Continue: 6/25/24  Therapist will teach and then engage pt in creating list of self-care strategies        Patient has reviewed and agreed to the above plan.      Shawn G. Ullrich, [facility]  November 21, 2023        "

## 2024-06-24 ASSESSMENT — PATIENT HEALTH QUESTIONNAIRE - PHQ9
SUM OF ALL RESPONSES TO PHQ QUESTIONS 1-9: 9
10. IF YOU CHECKED OFF ANY PROBLEMS, HOW DIFFICULT HAVE THESE PROBLEMS MADE IT FOR YOU TO DO YOUR WORK, TAKE CARE OF THINGS AT HOME, OR GET ALONG WITH OTHER PEOPLE: SOMEWHAT DIFFICULT
SUM OF ALL RESPONSES TO PHQ QUESTIONS 1-9: 9

## 2024-06-24 ASSESSMENT — ANXIETY QUESTIONNAIRES
8. IF YOU CHECKED OFF ANY PROBLEMS, HOW DIFFICULT HAVE THESE MADE IT FOR YOU TO DO YOUR WORK, TAKE CARE OF THINGS AT HOME, OR GET ALONG WITH OTHER PEOPLE?: SOMEWHAT DIFFICULT
2. NOT BEING ABLE TO STOP OR CONTROL WORRYING: NEARLY EVERY DAY
7. FEELING AFRAID AS IF SOMETHING AWFUL MIGHT HAPPEN: MORE THAN HALF THE DAYS
6. BECOMING EASILY ANNOYED OR IRRITABLE: SEVERAL DAYS
3. WORRYING TOO MUCH ABOUT DIFFERENT THINGS: NEARLY EVERY DAY
IF YOU CHECKED OFF ANY PROBLEMS ON THIS QUESTIONNAIRE, HOW DIFFICULT HAVE THESE PROBLEMS MADE IT FOR YOU TO DO YOUR WORK, TAKE CARE OF THINGS AT HOME, OR GET ALONG WITH OTHER PEOPLE: SOMEWHAT DIFFICULT
1. FEELING NERVOUS, ANXIOUS, OR ON EDGE: NEARLY EVERY DAY
GAD7 TOTAL SCORE: 15
GAD7 TOTAL SCORE: 15
7. FEELING AFRAID AS IF SOMETHING AWFUL MIGHT HAPPEN: MORE THAN HALF THE DAYS
4. TROUBLE RELAXING: MORE THAN HALF THE DAYS
GAD7 TOTAL SCORE: 15
5. BEING SO RESTLESS THAT IT IS HARD TO SIT STILL: SEVERAL DAYS

## 2024-06-25 ENCOUNTER — VIRTUAL VISIT (OUTPATIENT)
Dept: BEHAVIORAL HEALTH | Facility: CLINIC | Age: 49
End: 2024-06-25
Payer: COMMERCIAL

## 2024-06-25 DIAGNOSIS — F41.1 GENERALIZED ANXIETY DISORDER: Primary | ICD-10-CM

## 2024-10-08 ASSESSMENT — ANXIETY QUESTIONNAIRES
GAD7 TOTAL SCORE: 15
5. BEING SO RESTLESS THAT IT IS HARD TO SIT STILL: NOT AT ALL
GAD7 TOTAL SCORE: 15
3. WORRYING TOO MUCH ABOUT DIFFERENT THINGS: NEARLY EVERY DAY
IF YOU CHECKED OFF ANY PROBLEMS ON THIS QUESTIONNAIRE, HOW DIFFICULT HAVE THESE PROBLEMS MADE IT FOR YOU TO DO YOUR WORK, TAKE CARE OF THINGS AT HOME, OR GET ALONG WITH OTHER PEOPLE: SOMEWHAT DIFFICULT
GAD7 TOTAL SCORE: 15
7. FEELING AFRAID AS IF SOMETHING AWFUL MIGHT HAPPEN: NEARLY EVERY DAY
8. IF YOU CHECKED OFF ANY PROBLEMS, HOW DIFFICULT HAVE THESE MADE IT FOR YOU TO DO YOUR WORK, TAKE CARE OF THINGS AT HOME, OR GET ALONG WITH OTHER PEOPLE?: SOMEWHAT DIFFICULT
6. BECOMING EASILY ANNOYED OR IRRITABLE: SEVERAL DAYS
4. TROUBLE RELAXING: MORE THAN HALF THE DAYS
2. NOT BEING ABLE TO STOP OR CONTROL WORRYING: NEARLY EVERY DAY
1. FEELING NERVOUS, ANXIOUS, OR ON EDGE: NEARLY EVERY DAY
7. FEELING AFRAID AS IF SOMETHING AWFUL MIGHT HAPPEN: NEARLY EVERY DAY

## 2024-10-09 ENCOUNTER — VIRTUAL VISIT (OUTPATIENT)
Dept: BEHAVIORAL HEALTH | Facility: CLINIC | Age: 49
End: 2024-10-09
Payer: COMMERCIAL

## 2024-10-09 DIAGNOSIS — F33.1 MAJOR DEPRESSIVE DISORDER, RECURRENT EPISODE, MODERATE (H): ICD-10-CM

## 2024-10-09 DIAGNOSIS — F41.1 GENERALIZED ANXIETY DISORDER: Primary | ICD-10-CM

## 2024-10-09 NOTE — PROGRESS NOTES
[facility]      PATIENT'S NAME: Marielos Marti  PREFERRED NAME: Marielos  PRONOUNS:  she/her    MRN: [id number]  : 1975  ADDRESS: [address]  [facility] [address]  ACCT. NUMBER:  [id number]  DATE OF SERVICE: 10/09/24  PREFERRED PHONE: [phone]  May we leave a program related message: Yes  SERVICE MODALITY:  Video Visit:      Provider verified identity through the following two step process.  Patient provided:  Patient  and Patient address    Telemedicine Visit: The patient's condition can be safely assessed and treated via synchronous audio and visual telemedicine encounter.      Reason for Telemedicine Visit: Patient has requested telehealth visit    Originating Site (Patient Location): Patient's home    Distant Site (Provider Location): [facility]    Consent:  The patient/guardian has verbally consented to: the potential risks and benefits of telemedicine (video visit) versus in person care; bill my insurance or make self-payment for services provided; and responsibility for payment of non-covered services.     Patient would like the video invitation sent by:  My Chart    Mode of Communication:  Video Conference via [employer]    Distant Location (Provider):  On-site    As the provider I attest to compliance with applicable laws and regulations related to telemedicine.      Behavioral Health Clinician Progress Note    Patient Name: Marielos Marti           Service Type:  Individual      Service Location:   Telehealth; see above     Session Start Time: 4:01 pm  Session End Time:  4:54 pm      Session Length: 53 - 60      Attendees: Patient    Visit Activities (Refresh list every visit): BHC Only    Diagnostic Assessment Date: 24  Treatment Plan Review Date: 24  CGI Review Date: 25  Promis 10 Review Date: 25    Clinical Global Impressions  First:  Considering your total clinical experience with this particular patient population,  "how severe are the patient's symptoms at this time?: 4 (10/9/2024  5:02 PM)    Most recent:  Compared to the patient's condition at the START of treatment, this patient's condition is: 5 (10/9/2024  5:02 PM)      See Flowsheets for today's PHQ-9 and MARY-7 results  Previous PHQ-9:       5/6/2024    10:57 AM 6/24/2024     9:25 AM 10/8/2024     4:49 PM   PHQ-9 SCORE   PHQ-9 Total Score MyChart 7 (Mild depression) 9 (Mild depression) 8 (Mild depression)   PHQ-9 Total Score 7 9 8     Previous MARY-7:       5/6/2024    10:57 AM 6/24/2024     9:25 AM 10/8/2024     4:49 PM   MARY-7 SCORE   Total Score 9 (mild anxiety) 15 (severe anxiety) 15 (severe anxiety)   Total Score 9 15 15       DATA  Extended Session (60+ minutes): No  Interactive Complexity: No  Crisis: No  [facility] Patient: No    Treatment Objective(s) Addressed in This Session:  Target Behavior(s):  increase socialization and activity level, and be more present    Depressed Mood: Increase interest, engagement, and pleasure in doing things  Decrease frequency and intensity of feeling down, depressed, hopeless  Feel less tired and more energy during the day   Identify negative self-talk and behaviors: challenge core beliefs, myths, and actions  Anxiety: will experience a reduction in anxiety, will develop more effective coping skills to manage anxiety symptoms, will develop healthy cognitive patterns and beliefs and will increase ability to function adaptively    Current Stressors / Issues:    Marielos reported work stress has only increased since last session in June, stating, \"Its gotten worse\", \"We all are under a lot of pressure\", \"I feel pretty demoralized\", and \"its bad\".  Marielos reported she's hasn't taken any time off since April, but she was finally able to get some time off approved in the next several weeks.  [facility] validated her experience and reflected feeling disheartened, unsupported, not heard, and loss of control.  Marielos affirmed, and noted its gotten to the " "point that colleagues are looking for other jobs and she is keeping an eye out of other jobs too.  And even though looking for another job can provide a sense of hope, \"Its really scary to change jobs\" and discouraging to have to accrue benefits again.  [facility] used complex reflection to address ambivalence, Marielos replied a promotional position will be opening up at the beginning of the year and if she doesn't get the job that will be a sign that her work is not recognized or appreciated, and therefore she will need to leave.  Marielos explained, this upcoming situation will help to provide \"clarity\" and make a decision about her future career \"easier\".  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  Marielos stated, \"I've tried to grab control\" by working on this project and \"this is the only thing at my job that I have control of\" at work.  Lastly, Marielos reported she's also trying to manage work stress by getting support from colleagues and maintaining healthy boundaries around work.      Finally, [facility] and Marielos explored how she's managing stress outside of work.  Marielos reported she started the started [place] Master  class last night and she really enjoyed it.  Marielos reported when she's in class she's fully present, she's connecting with others, and she's learning.  Marielos stated, \"This is what I was really missing...I'm really excited\", it also incorporates a volunteer opportunity, and it could lead to more volunteer opportunities in the future.  Marielos is excited this could be the start of something that's meaningful and enjoyable.      Progress on Treatment Objective(s) / Homework:  Worsening - ACTION (Actively working towards change); Intervened by reinforcing change plan / affirming steps taken    Motivational Interviewing    MI Intervention: Co-Developed Goal: " Increase socialization and activity level; be more present, Expressed Empathy/Understanding, Supported Autonomy, Collaboration, Evocation, Open-ended questions, Reflections: simple and complex and Change talk (evoked)     Change Talk Expressed by the Patient: Desire to change Ability to change Reasons to change Need to change    Provider Response to Change Talk: E - Evoked more info from patient about behavior change, A - Affirmed patient's thoughts, decisions, or attempts at behavior change, R - Reflected patient's change talk and S - Summarized patient's change talk statements    Psychodynamic psychotherapy  Discuss patient's emotional dynamics and issues and how they impact behaviors  Explore patient's history of relationships and how they impact present behaviors  Explore how to work with and make changes in these schemas and patterns    Solution-Focused Therapy  Explore patterns in patient's behaviors and relationships and discuss options for new behaviors  Explore new options for problem-solving, communication, managing stress, etc.      Care Plan review completed: Yes    Medication Review:  No changes to current psychiatric medication(s)    Medication Compliance:  Yes    Changes in Health Issues:   None reported    Chemical Use Review:   Substance Use: Chemical use reviewed, no active concerns identified      Tobacco Use: No current tobacco use.      Assessment: Current Emotional / Mental Status (status of significant symptoms):  Risk status (Self / Other harm or suicidal ideation)  Patient has had a history of suicidal ideation: passive, never intent or plan; no SIB, no SA; most recent passive SI was years ago  Patient denies current fears or concerns for personal safety.  Patient denies current or recent suicidal ideation or behaviors.  Patient denies current or recent homicidal ideation or behaviors.  Patient denies current or recent self injurious behavior or ideation.  Patient denies other safety  concerns.  A safety and risk management plan has not been developed at this time, however patient was encouraged to call [place] [phone] should there be a change in any of these risk factors.    Appearance:   Appropriate   Eye Contact:   Good   Psychomotor Behavior: Normal   Attitude:   Cooperative   Orientation:   All  Speech   Rate / Production: Normal    Volume:  Normal   Mood:    Anxious , low  Affect:    Congruent with mood   Thought Content:  Clear   Thought Form:  Coherent  Logical   Insight:    Good     Diagnoses:  1. Generalized anxiety disorder    2. Major depressive disorder, recurrent episode, moderate (H)        Collateral Reports Completed:  Routed note to PCP    Plan: (Homework, other):  Patient was given information about behavioral services and encouraged to schedule a follow up appointment with the clinic [facility] in 1 month.  She was also given information about mental health symptoms and treatment options .  CD Recommendations: No indications of CD issues.   Shawn Ullrich LICSW, [facility]      ______________________________________________________________________    Integrated Primary Care Behavioral Health Treatment Plan    Patient's Name: Marielos Marti  YOB: 1975    Date of Creation: 11/21/23   Date Treatment Plan Last Reviewed/Revised: 6/25/24    DSM5 Diagnoses: 296.32 (F33.1) Major Depressive Disorder, Recurrent Episode, Moderate _ or 300.02 (F41.1) Generalized Anxiety Disorder  Psychosocial / Contextual Factors: , Partnered, Female, heterosexual; full-time employment;   PROMIS (reviewed every 90 days): pt completed on 5/6/24    Cultural Influences that will aid in the patient's recovery and enhance resiliency: Pt is from [place], family resides on [place]--support maintaining family connection    Considerations specific to safety concerns (eg suicidal, homicidal risks): none     How are Patient's natural supports included in treatment:   pt declined to include  "supports in treatment planning     Coordination of Care:   coordinate care with PCP     Review and Revisions of the Treatment Plan (every 180 days):   Treatment plan reviewed with patient; Treatment plan remains current based on the patient's status and progress to date         Referral / Collaboration:  Referral to another professional/service is not indicated at this time..    Anticipated number of session for this episode of care: 8  Anticipation frequency of session: Every other week  Anticipated Duration of each session: 38-52 minutes  Treatment plan will be reviewed in 90 days or when goals have been changed.       MeasurableTreatment Goal(s) related to diagnosis / functional impairment(s)  Goal 1: Patient will decrease anxiety and improve mood by developing local support network, effectively apply interpersonal skills, and consistently engage in self-care activities    I will know I've met my goal when :  \"finding my community in the [employer]\"; feel \"connection\" to place and people/community      Objective #A (Patient Action)    Patient will attend and participate in social or recreational activities  per week to build connection and community .  Status: Continued - Date(s): 11/21/23    Intervention(s)  Therapist will assign homework to list activities and then schedule into weekly calendar    Objective #B (Patient Action)    Status: Continued - Date(s):6/25/24   Patient will practice setting boundaries 3 times in the next 12 weeks.    Intervention(s)  Therapist will teach about healthy boundaries. With family members .       Objective #C   Patient will use at least one self-care strategy daily  Status: Continue: 6/25/24  Therapist will teach and then engage pt in creating list of self-care strategies        Patient has reviewed and agreed to the above plan.      Shawn G. Ullrich, [facility]  November 21, 2023        "

## 2024-11-07 NOTE — PROGRESS NOTES
[facility]      PATIENT'S NAME: Marielos Marti  PREFERRED NAME: Marielos  PRONOUNS:  she/her    MRN: [id number]  : 1975  ADDRESS: [address]  [facility] [address]  ACCT. NUMBER:  [id number]  DATE OF SERVICE: 24  PREFERRED PHONE: [phone]  May we leave a program related message: Yes  SERVICE MODALITY:  Video Visit:      Provider verified identity through the following two step process.  Patient provided:  Patient  and Patient address    Telemedicine Visit: The patient's condition can be safely assessed and treated via synchronous audio and visual telemedicine encounter.      Reason for Telemedicine Visit: Patient has requested telehealth visit    Originating Site (Patient Location): Patient's home    Distant Site (Provider Location): [facility]    Consent:  The patient/guardian has verbally consented to: the potential risks and benefits of telemedicine (video visit) versus in person care; bill my insurance or make self-payment for services provided; and responsibility for payment of non-covered services.     Patient would like the video invitation sent by:  My Chart    Mode of Communication:  Video Conference via [facility] [facility]    Distant Location (Provider):  On-site    As the provider I attest to compliance with applicable laws and regulations related to telemedicine.      Behavioral Health Clinician Progress Note    Patient Name: Marielos Marti           Service Type:  Individual      Service Location:   Telehealth; see above     Session Start Time: 11:04 am  Session End Time:  11:56 am      Session Length: 38 - 52      Attendees: Patient    Visit Activities (Refresh list every visit): BHC Only    Diagnostic Assessment Date: 24  Treatment Plan Review Date: 24  CGI Review Date: 25  Promis 10 Review Date: 25    Clinical Global Impressions  First:  Considering your total clinical experience with this particular patient  "population, how severe are the patient's symptoms at this time?: 4 (10/9/2024  5:02 PM)    Most recent:  Compared to the patient's condition at the START of treatment, this patient's condition is: 5 (10/9/2024  5:02 PM)      See Flowsheets for today's PHQ-9 and MARY-7 results  Previous PHQ-9:       6/24/2024     9:25 AM 10/8/2024     4:49 PM 11/8/2024     9:40 AM   PHQ-9 SCORE   PHQ-9 Total Score MyChart 9 (Mild depression) 8 (Mild depression) 10 (Moderate depression)   PHQ-9 Total Score 9 8 10        Patient-reported     Previous MARY-7:       6/24/2024     9:25 AM 10/8/2024     4:49 PM 11/8/2024     9:40 AM   MARY-7 SCORE   Total Score 15 (severe anxiety) 15 (severe anxiety) 14 (moderate anxiety)   Total Score 15 15 14        Patient-reported       DATA  Extended Session (60+ minutes): No  Interactive Complexity: No  Crisis: No  [facility] Patient: No    Treatment Objective(s) Addressed in This Session:  Target Behavior(s):  increase socialization and activity level, and be more present    Depressed Mood: Increase interest, engagement, and pleasure in doing things  Decrease frequency and intensity of feeling down, depressed, hopeless  Feel less tired and more energy during the day   Identify negative self-talk and behaviors: challenge core beliefs, myths, and actions  Anxiety: will experience a reduction in anxiety, will develop more effective coping skills to manage anxiety symptoms, will develop healthy cognitive patterns and beliefs and will increase ability to function adaptively    Current Stressors / Issues:  Marielos presented with depressed and anxious mood, reporting mood has been impacted by election and continued work stress.  [facility] processed events by assisting Marielos with identifying thoughts and emotions, she stated, she's experiencing \"grief, alternating between falling into despair and rage\", \"it just feels really sad\", \"this country is cruel...its hard to reconcile\", \"I wonder how this will impact my job?\", and " "\"I feel so dumb for thinking people would be better\".  In regard to work stress, [facility] provided support thru active listening, and then used MI interventions to decrease ambivalence and promote movement thru stages of change.  Marielos readily acknowledged and listed con's of job, including she doesn't think remote work is good for her mental health, her boss' \"management style\" is unhealthy (\"its demoralizing\", \"its gotten worse\", he plays \"favorites\"), and she doesn't feel recognized or valued.  Marielos recognized the above stressors have resulted in \"I've engaged in some unhealthy coping mechanisms...shopping\".  [facility] then used Solution Focused interventions to identify effective coping strategies, Marielos shared the following: \"I had class last night and that helped\" (Master  Class); learn about others who have endured ([place] --\"they're still here\"); \"having more of a community helps more to bare this\"; and challenging herself in her professional life (\"I submitted my proposal for the conference...its a professional development thing that I've never done before\").  Marielos reported the next couple months will provide more clarity about the future of her job, noting how her boss handles the upcoming job opening will give her more information about how he sees Marielso's future in the company.      Progress on Treatment Objective(s) / Homework:  Worsening - ACTION (Actively working towards change); Intervened by reinforcing change plan / affirming steps taken    Motivational Interviewing    MI Intervention: Co-Developed Goal: Increase socialization and activity level; be more present, Expressed Empathy/Understanding, Supported Autonomy, Collaboration, Evocation, Open-ended questions, Reflections: simple and complex and Change talk (evoked)     Change Talk Expressed by the Patient: Desire to change Ability to change Reasons to change Need to change    Provider Response to Change Talk: E - Evoked more info from " patient about behavior change, A - Affirmed patient's thoughts, decisions, or attempts at behavior change, R - Reflected patient's change talk and S - Summarized patient's change talk statements    Psychodynamic psychotherapy  Discuss patient's emotional dynamics and issues and how they impact behaviors  Explore patient's history of relationships and how they impact present behaviors  Explore how to work with and make changes in these schemas and patterns    Solution-Focused Therapy  Explore patterns in patient's behaviors and relationships and discuss options for new behaviors  Explore new options for problem-solving, communication, managing stress, etc.      Care Plan review completed: Yes    Medication Review:  No changes to current psychiatric medication(s)    Medication Compliance:  Yes    Changes in Health Issues:   None reported    Chemical Use Review:   Substance Use: Chemical use reviewed, no active concerns identified      Tobacco Use: No current tobacco use.      Assessment: Current Emotional / Mental Status (status of significant symptoms):  Risk status (Self / Other harm or suicidal ideation)  Patient has had a history of suicidal ideation: passive, never intent or plan; no SIB, no SA; most recent passive SI was years ago  Patient denies current fears or concerns for personal safety.  Patient denies current or recent suicidal ideation or behaviors.  Patient denies current or recent homicidal ideation or behaviors.  Patient denies current or recent self injurious behavior or ideation.  Patient denies other safety concerns.  A safety and risk management plan has not been developed at this time, however patient was encouraged to call [place] [phone] should there be a change in any of these risk factors.    Appearance:   Appropriate   Eye Contact:   Good   Psychomotor Behavior: Normal   Attitude:   Cooperative   Orientation:   All  Speech   Rate / Production: Normal    Volume:  Normal   Mood:    Anxious  , depressed  Affect:    Subdued   Thought Content:  Clear   Thought Form:  Coherent  Logical   Insight:    Good     Diagnoses:  1. Generalized anxiety disorder    2. Major depressive disorder, recurrent episode, mild (H)          Collateral Reports Completed:  Routed note to PCP    Plan: (Homework, other):  Patient was given information about behavioral services and encouraged to schedule a follow up appointment with the clinic [facility] in 1 month.  She was also given information about mental health symptoms and treatment options .  CD Recommendations: No indications of CD issues.   Shawn Ullrich [facility], [facility]      ______________________________________________________________________    Integrated Primary Care Behavioral Health Treatment Plan    Patient's Name: Marielos Marti  YOB: 1975    Date of Creation: 11/21/23   Date Treatment Plan Last Reviewed/Revised: 6/25/24    DSM5 Diagnoses: 296.32 (F33.1) Major Depressive Disorder, Recurrent Episode, Moderate _ or 300.02 (F41.1) Generalized Anxiety Disorder  Psychosocial / Contextual Factors: , Partnered, Female, heterosexual; full-time employment;   PROMIS (reviewed every 90 days): pt completed on 5/6/24    Cultural Influences that will aid in the patient's recovery and enhance resiliency: Pt is from [place], family resides on [place]--support maintaining family connection    Considerations specific to safety concerns (eg suicidal, homicidal risks): none     How are Patient's natural supports included in treatment:   pt declined to include supports in treatment planning     Coordination of Care:   coordinate care with PCP     Review and Revisions of the Treatment Plan (every 180 days):   Treatment plan reviewed with patient; Treatment plan remains current based on the patient's status and progress to date         Referral / Collaboration:  Referral to another professional/service is not indicated at this time..    Anticipated number of  "session for this episode of care: 8  Anticipation frequency of session: Every other week  Anticipated Duration of each session: 38-52 minutes  Treatment plan will be reviewed in 90 days or when goals have been changed.       MeasurableTreatment Goal(s) related to diagnosis / functional impairment(s)  Goal 1: Patient will decrease anxiety and improve mood by developing local support network, effectively apply interpersonal skills, and consistently engage in self-care activities    I will know I've met my goal when :  \"finding my community in the [place]\"; feel \"connection\" to place and people/community      Objective #A (Patient Action)    Patient will attend and participate in social or recreational activities  per week to build connection and community .  Status: Continued - Date(s): 11/21/23    Intervention(s)  Therapist will assign homework to list activities and then schedule into weekly calendar    Objective #B (Patient Action)    Status: Continued - Date(s):6/25/24   Patient will practice setting boundaries 3 times in the next 12 weeks.    Intervention(s)  Therapist will teach about healthy boundaries. With family members .       Objective #C   Patient will use at least one self-care strategy daily  Status: Continue: 6/25/24  Therapist will teach and then engage pt in creating list of self-care strategies        Patient has reviewed and agreed to the above plan.      Shawn G. Ullrich, [facility]  November 21, 2023        "

## 2024-11-08 ENCOUNTER — VIRTUAL VISIT (OUTPATIENT)
Dept: BEHAVIORAL HEALTH | Facility: CLINIC | Age: 49
End: 2024-11-08
Payer: COMMERCIAL

## 2024-11-08 DIAGNOSIS — F33.0 MAJOR DEPRESSIVE DISORDER, RECURRENT EPISODE, MILD (H): ICD-10-CM

## 2024-11-08 DIAGNOSIS — F41.1 GENERALIZED ANXIETY DISORDER: Primary | ICD-10-CM

## 2024-11-08 ASSESSMENT — ANXIETY QUESTIONNAIRES
GAD7 TOTAL SCORE: 14
4. TROUBLE RELAXING: MORE THAN HALF THE DAYS
1. FEELING NERVOUS, ANXIOUS, OR ON EDGE: NEARLY EVERY DAY
7. FEELING AFRAID AS IF SOMETHING AWFUL MIGHT HAPPEN: NEARLY EVERY DAY
2. NOT BEING ABLE TO STOP OR CONTROL WORRYING: NEARLY EVERY DAY
GAD7 TOTAL SCORE: 14
GAD7 TOTAL SCORE: 14
5. BEING SO RESTLESS THAT IT IS HARD TO SIT STILL: NOT AT ALL
6. BECOMING EASILY ANNOYED OR IRRITABLE: NOT AT ALL
8. IF YOU CHECKED OFF ANY PROBLEMS, HOW DIFFICULT HAVE THESE MADE IT FOR YOU TO DO YOUR WORK, TAKE CARE OF THINGS AT HOME, OR GET ALONG WITH OTHER PEOPLE?: VERY DIFFICULT
7. FEELING AFRAID AS IF SOMETHING AWFUL MIGHT HAPPEN: NEARLY EVERY DAY
3. WORRYING TOO MUCH ABOUT DIFFERENT THINGS: NEARLY EVERY DAY
IF YOU CHECKED OFF ANY PROBLEMS ON THIS QUESTIONNAIRE, HOW DIFFICULT HAVE THESE PROBLEMS MADE IT FOR YOU TO DO YOUR WORK, TAKE CARE OF THINGS AT HOME, OR GET ALONG WITH OTHER PEOPLE: VERY DIFFICULT

## 2024-12-03 NOTE — PROGRESS NOTES
[facility]      PATIENT'S NAME: Marielos Marti  PREFERRED NAME: Marielos  PRONOUNS:  she/her    MRN: [id number]  : 1975  ADDRESS: [address]  [facility] [address]  ACCT. NUMBER:  [id number]  DATE OF SERVICE: 24  PREFERRED PHONE: [phone]  May we leave a program related message: Yes  SERVICE MODALITY:  Video Visit:      Provider verified identity through the following two step process.  Patient provided:  Patient  and Patient address    Telemedicine Visit: The patient's condition can be safely assessed and treated via synchronous audio and visual telemedicine encounter.      Reason for Telemedicine Visit: Patient has requested telehealth visit    Originating Site (Patient Location): Patient's home    Distant Site (Provider Location): [facility]    Consent:  The patient/guardian has verbally consented to: the potential risks and benefits of telemedicine (video visit) versus in person care; bill my insurance or make self-payment for services provided; and responsibility for payment of non-covered services.     Patient would like the video invitation sent by:  My Chart    Mode of Communication:  [facility] Conference [facility]    Distant Location (Provider):  On-site    As the provider I attest to compliance with applicable laws and regulations related to telemedicine.      Behavioral Health Clinician Progress Note    Patient Name: Marielos Marti           Service Type:  Individual      Service Location:   Telehealth; see above     Session Start Time: 11:08 am  Session End Time:  11:59 am      Session Length: 38 - 52      Attendees: Patient    Visit Activities (Refresh list every visit): [facility] Only    Diagnostic Assessment Date: 24  Treatment Plan Review Date: 24  CGI Review Date: 25  Promis 10 Review Date: 25    Clinical Global Impressions  First:  Considering your total clinical experience with this particular patient  "population, how severe are the patient's symptoms at this time?: 4 (10/9/2024  5:02 PM)    Most recent:  Compared to the patient's condition at the START of treatment, this patient's condition is: 5 (10/9/2024  5:02 PM)      See Flowsheets for today's PHQ-9 and MARY-7 results  Previous PHQ-9:       10/8/2024     4:49 PM 11/8/2024     9:40 AM 12/4/2024    10:13 AM   PHQ-9 SCORE   PHQ-9 Total Score MyChart 8 (Mild depression) 10 (Moderate depression) 10 (Moderate depression)   PHQ-9 Total Score 8 10  10        Patient-reported     Previous MARY-7:       10/8/2024     4:49 PM 11/8/2024     9:40 AM 12/4/2024    10:15 AM   MARY-7 SCORE   Total Score 15 (severe anxiety) 14 (moderate anxiety) 15 (severe anxiety)   Total Score 15 14  15        Patient-reported       DATA  Extended Session (60+ minutes): No  Interactive Complexity: No  Crisis: No  [facility] Patient: No    Treatment Objective(s) Addressed in This Session:  Target Behavior(s):  increase socialization and activity level, and be more present    Depressed Mood: Increase interest, engagement, and pleasure in doing things  Decrease frequency and intensity of feeling down, depressed, hopeless  Feel less tired and more energy during the day   Identify negative self-talk and behaviors: challenge core beliefs, myths, and actions  Anxiety: will experience a reduction in anxiety, will develop more effective coping skills to manage anxiety symptoms, will develop healthy cognitive patterns and beliefs and will increase ability to function adaptively    Current Stressors / Issues:  Marielos presented with low and anxious mood, reporting she's been ill and noting increased fatigue and decreased motivation with the colder weather and decreased daylight.  Marielos reported work continues to be a significant stressor, sharing her boss has \"gotten worse\", more disorganized, and he recently forgot her bi-annual meeting with him.  Marielos stated, \"I do not feel valued\", \"I'm not a priority\", " "and neither do the rest of her colleagues.  [facility] provided support through active listening and validation, and then assisted Marielos with identifying options for improving mood.  Marielos responded by sharing a completely different experience when attending Master  Class.  Marielos explained how when at class she feels \"valued, feeling like my contributions are valued...that was really essential to me...that's something I don't feel at my job\".  Marielos reported this experience was like a \"reset\" and shifted her perspective, explaining, \"I have a choice on how I spend my time...I have a right to feel valued\".   Marielos reported while she hasn't decided about whether to stay or leave her job, she has decided \"I think I do need to get out there\" (ie job explore, apply and job interview), so she knows more about what options are available to her.         Progress on Treatment Objective(s) / Homework:  No improvement - PREPARATION (Decided to change - considering how); Intervened by negotiating a change plan and determining options / strategies for behavior change, identifying triggers, exploring social supports, and working towards setting a date to begin behavior change    Motivational Interviewing    MI Intervention: Co-Developed Goal: Increase socialization and activity level; be more present, Expressed Empathy/Understanding, Supported Autonomy, Collaboration, Evocation, Open-ended questions, Reflections: simple and complex and Change talk (evoked)     Change Talk Expressed by the Patient: Desire to change Ability to change Reasons to change Need to change    Provider Response to Change Talk: E - Evoked more info from patient about behavior change, A - Affirmed patient's thoughts, decisions, or attempts at behavior change, R - Reflected patient's change talk and S - Summarized patient's change talk statements    Solution-Focused Therapy  Explore patterns in patient's behaviors and relationships and discuss options " for new behaviors  Explore new options for problem-solving, communication, managing stress, etc.    Care Plan review completed: Yes    Medication Review:  No changes to current psychiatric medication(s)    Medication Compliance:  Yes    Changes in Health Issues:   None reported    Chemical Use Review:   Substance Use: Chemical use reviewed, no active concerns identified      Tobacco Use: No current tobacco use.      Assessment: Current Emotional / Mental Status (status of significant symptoms):  Risk status (Self / Other harm or suicidal ideation)  Patient has had a history of suicidal ideation: passive, never intent or plan; no SIB, no SA; most recent passive SI was years ago  Patient denies current fears or concerns for personal safety.  Patient denies current or recent suicidal ideation or behaviors.  Patient denies current or recent homicidal ideation or behaviors.  Patient denies current or recent self injurious behavior or ideation.  Patient denies other safety concerns.  A safety and risk management plan has not been developed at this time, however patient was encouraged to call [facility] should there be a change in any of these risk factors.    Appearance:   Appropriate   Eye Contact:   Good   Psychomotor Behavior: Normal   Attitude:   Cooperative   Orientation:   All  Speech   Rate / Production: Normal    Volume:  Normal   Mood:    Anxious , depressed  Affect:    Worrisome   Thought Content:  Clear   Thought Form:  Coherent  Logical   Insight:    Good     Diagnoses:  1. Generalized anxiety disorder      Collateral Reports Completed:  Routed note to PCP    Plan: (Homework, other):  Patient was given information about behavioral services and encouraged to schedule a follow up appointment with the clinic [facility] in 1 month.  She was also given information about mental health symptoms and treatment options .  CD Recommendations: No indications of CD issues.   Shawn Ullrich LICSW,  "LADC      ______________________________________________________________________    Integrated Primary Care Behavioral Health Treatment Plan    Patient's Name: Marielos Marti  YOB: 1975    Date of Creation: 11/21/23   Date Treatment Plan Last Reviewed/Revised: 6/25/24    DSM5 Diagnoses: 296.32 (F33.1) Major Depressive Disorder, Recurrent Episode, Moderate _ or 300.02 (F41.1) Generalized Anxiety Disorder  Psychosocial / Contextual Factors: , Partnered, Female, heterosexual; full-time employment;   PROMIS (reviewed every 90 days): pt completed on 5/6/24    Cultural Influences that will aid in the patient's recovery and enhance resiliency: Pt is from [facility], family resides on [facility]--support maintaining family connection    Considerations specific to safety concerns (eg suicidal, homicidal risks): none     How are Patient's natural supports included in treatment:   pt declined to include supports in treatment planning     Coordination of Care:   coordinate care with PCP     Review and Revisions of the Treatment Plan (every 180 days):   Treatment plan reviewed with patient; Treatment plan remains current based on the patient's status and progress to date         Referral / Collaboration:  Referral to another professional/service is not indicated at this time..    Anticipated number of session for this episode of care: 8  Anticipation frequency of session: Every other week  Anticipated Duration of each session: 38-52 minutes  Treatment plan will be reviewed in 90 days or when goals have been changed.       MeasurableTreatment Goal(s) related to diagnosis / functional impairment(s)  Goal 1: Patient will decrease anxiety and improve mood by developing local support network, effectively apply interpersonal skills, and consistently engage in self-care activities    I will know I've met my goal when :  \"finding my community in the [place]\"; feel \"connection\" to place and " people/community      Objective #A (Patient Action)    Patient will attend and participate in social or recreational activities  per week to build connection and community .  Status: Continued - Date(s): 11/21/23    Intervention(s)  Therapist will assign homework to list activities and then schedule into weekly calendar    Objective #B (Patient Action)    Status: Continued - Date(s):6/25/24   Patient will practice setting boundaries 3 times in the next 12 weeks.    Intervention(s)  Therapist will teach about healthy boundaries. With family members .       Objective #C   Patient will use at least one self-care strategy daily  Status: Continue: 6/25/24  Therapist will teach and then engage pt in creating list of self-care strategies        Patient has reviewed and agreed to the above plan.      Shawn G. Ullrich, [facility]  November 21, 2023

## 2024-12-04 ENCOUNTER — VIRTUAL VISIT (OUTPATIENT)
Dept: BEHAVIORAL HEALTH | Facility: CLINIC | Age: 49
End: 2024-12-04
Payer: COMMERCIAL

## 2024-12-04 DIAGNOSIS — F41.1 GENERALIZED ANXIETY DISORDER: Primary | ICD-10-CM

## 2024-12-04 ASSESSMENT — ANXIETY QUESTIONNAIRES
GAD7 TOTAL SCORE: 15
3. WORRYING TOO MUCH ABOUT DIFFERENT THINGS: NEARLY EVERY DAY
2. NOT BEING ABLE TO STOP OR CONTROL WORRYING: NEARLY EVERY DAY
GAD7 TOTAL SCORE: 15
8. IF YOU CHECKED OFF ANY PROBLEMS, HOW DIFFICULT HAVE THESE MADE IT FOR YOU TO DO YOUR WORK, TAKE CARE OF THINGS AT HOME, OR GET ALONG WITH OTHER PEOPLE?: VERY DIFFICULT
4. TROUBLE RELAXING: MORE THAN HALF THE DAYS
1. FEELING NERVOUS, ANXIOUS, OR ON EDGE: NEARLY EVERY DAY
IF YOU CHECKED OFF ANY PROBLEMS ON THIS QUESTIONNAIRE, HOW DIFFICULT HAVE THESE PROBLEMS MADE IT FOR YOU TO DO YOUR WORK, TAKE CARE OF THINGS AT HOME, OR GET ALONG WITH OTHER PEOPLE: VERY DIFFICULT
7. FEELING AFRAID AS IF SOMETHING AWFUL MIGHT HAPPEN: NEARLY EVERY DAY
5. BEING SO RESTLESS THAT IT IS HARD TO SIT STILL: NOT AT ALL
6. BECOMING EASILY ANNOYED OR IRRITABLE: SEVERAL DAYS
7. FEELING AFRAID AS IF SOMETHING AWFUL MIGHT HAPPEN: NEARLY EVERY DAY
GAD7 TOTAL SCORE: 15

## 2024-12-04 ASSESSMENT — PATIENT HEALTH QUESTIONNAIRE - PHQ9
SUM OF ALL RESPONSES TO PHQ QUESTIONS 1-9: 10
SUM OF ALL RESPONSES TO PHQ QUESTIONS 1-9: 10
10. IF YOU CHECKED OFF ANY PROBLEMS, HOW DIFFICULT HAVE THESE PROBLEMS MADE IT FOR YOU TO DO YOUR WORK, TAKE CARE OF THINGS AT HOME, OR GET ALONG WITH OTHER PEOPLE: SOMEWHAT DIFFICULT

## 2025-01-27 SDOH — HEALTH STABILITY: PHYSICAL HEALTH: ON AVERAGE, HOW MANY MINUTES DO YOU ENGAGE IN EXERCISE AT THIS LEVEL?: 20 MIN

## 2025-01-27 SDOH — HEALTH STABILITY: PHYSICAL HEALTH: ON AVERAGE, HOW MANY DAYS PER WEEK DO YOU ENGAGE IN MODERATE TO STRENUOUS EXERCISE (LIKE A BRISK WALK)?: 4 DAYS

## 2025-01-27 ASSESSMENT — SOCIAL DETERMINANTS OF HEALTH (SDOH): HOW OFTEN DO YOU GET TOGETHER WITH FRIENDS OR RELATIVES?: TWICE A WEEK

## 2025-01-30 ENCOUNTER — OFFICE VISIT (OUTPATIENT)
Dept: FAMILY MEDICINE | Facility: CLINIC | Age: 50
End: 2025-01-30
Payer: COMMERCIAL

## 2025-01-30 VITALS
DIASTOLIC BLOOD PRESSURE: 75 MMHG | HEART RATE: 70 BPM | TEMPERATURE: 97.7 F | WEIGHT: 138.08 LBS | BODY MASS INDEX: 24.46 KG/M2 | SYSTOLIC BLOOD PRESSURE: 114 MMHG | HEIGHT: 63 IN | OXYGEN SATURATION: 97 %

## 2025-01-30 DIAGNOSIS — Z12.31 ENCOUNTER FOR SCREENING MAMMOGRAM FOR MALIGNANT NEOPLASM OF BREAST: ICD-10-CM

## 2025-01-30 DIAGNOSIS — F41.9 ANXIETY AND DEPRESSION: ICD-10-CM

## 2025-01-30 DIAGNOSIS — B00.9 RECURRENT HSV (HERPES SIMPLEX VIRUS): ICD-10-CM

## 2025-01-30 DIAGNOSIS — F32.A ANXIETY AND DEPRESSION: ICD-10-CM

## 2025-01-30 DIAGNOSIS — Z00.00 ROUTINE GENERAL MEDICAL EXAMINATION AT A HEALTH CARE FACILITY: Primary | ICD-10-CM

## 2025-01-30 DIAGNOSIS — R06.2 WHEEZING: ICD-10-CM

## 2025-01-30 RX ORDER — SERTRALINE HYDROCHLORIDE 100 MG/1
100 TABLET, FILM COATED ORAL DAILY
Qty: 90 TABLET | Refills: 3 | OUTPATIENT
Start: 2025-01-30

## 2025-01-30 RX ORDER — SERTRALINE HYDROCHLORIDE 100 MG/1
100 TABLET, FILM COATED ORAL DAILY
Qty: 90 TABLET | Refills: 3 | Status: SHIPPED | OUTPATIENT
Start: 2025-01-30

## 2025-01-30 RX ORDER — ALBUTEROL SULFATE 90 UG/1
1-2 AEROSOL, METERED RESPIRATORY (INHALATION) EVERY 6 HOURS PRN
Qty: 18 G | Refills: 1 | Status: SHIPPED | OUTPATIENT
Start: 2025-01-30

## 2025-01-30 RX ORDER — VALACYCLOVIR HYDROCHLORIDE 500 MG/1
500 TABLET, FILM COATED ORAL DAILY
Qty: 90 TABLET | Refills: 3 | Status: SHIPPED | OUTPATIENT
Start: 2025-01-30

## 2025-01-30 NOTE — PATIENT INSTRUCTIONS
Patient Education   Preventive Care Advice   This is general advice given by our system to help you stay healthy. However, your care team may have specific advice just for you. Please talk to your care team about your preventive care needs.  Nutrition  Eat 5 or more servings of fruits and vegetables each day.  Try wheat bread, brown rice and whole grain pasta (instead of white bread, rice, and pasta).  Get enough calcium and vitamin D. Check the label on foods and aim for 100% of the RDA (recommended daily allowance).  Lifestyle  Exercise at least 150 minutes each week  (30 minutes a day, 5 days a week).  Do muscle strengthening activities 2 days a week. These help control your weight and prevent disease.  No smoking.  Wear sunscreen to prevent skin cancer.  Have a dental exam and cleaning every 6 months.  Yearly exams  See your health care team every year to talk about:  Any changes in your health.  Any medicines your care team has prescribed.  Preventive care, family planning, and ways to prevent chronic diseases.  Shots (vaccines)   HPV shots (up to age 26), if you've never had them before.  Hepatitis B shots (up to age 59), if you've never had them before.  COVID-19 shot: Get this shot when it's due.  Flu shot: Get a flu shot every year.  Tetanus shot: Get a tetanus shot every 10 years.  Pneumococcal, hepatitis A, and RSV shots: Ask your care team if you need these based on your risk.  Shingles shot (for age 50 and up)  General health tests  Diabetes screening:  Starting at age 35, Get screened for diabetes at least every 3 years.  If you are younger than age 35, ask your care team if you should be screened for diabetes.  Cholesterol test: At age 39, start having a cholesterol test every 5 years, or more often if advised.  Bone density scan (DEXA): At age 50, ask your care team if you should have this scan for osteoporosis (brittle bones).  Hepatitis C: Get tested at least once in your life.  STIs (sexually  transmitted infections)  Before age 24: Ask your care team if you should be screened for STIs.  After age 24: Get screened for STIs if you're at risk. You are at risk for STIs (including HIV) if:  You are sexually active with more than one person.  You don't use condoms every time.  You or a partner was diagnosed with a sexually transmitted infection.  If you are at risk for HIV, ask about PrEP medicine to prevent HIV.  Get tested for HIV at least once in your life, whether you are at risk for HIV or not.  Cancer screening tests  Cervical cancer screening: If you have a cervix, begin getting regular cervical cancer screening tests starting at age 21.  Breast cancer scan (mammogram): If you've ever had breasts, begin having regular mammograms starting at age 40. This is a scan to check for breast cancer.  Colon cancer screening: It is important to start screening for colon cancer at age 45.  Have a colonoscopy test every 10 years (or more often if you're at risk) Or, ask your provider about stool tests like a FIT test every year or Cologuard test every 3 years.  To learn more about your testing options, visit:   .  For help making a decision, visit:   https://bit.ly/ti85985.  Prostate cancer screening test: If you have a prostate, ask your care team if a prostate cancer screening test (PSA) at age 55 is right for you.  Lung cancer screening: If you are a current or former smoker ages 50 to 80, ask your care team if ongoing lung cancer screenings are right for you.  For informational purposes only. Not to replace the advice of your health care provider. Copyright   2023 [facility]. All rights reserved. Clinically reviewed by the [facility]. "[employer]" [id number] - REV 01/24.  Learning About Stress  What is stress?     Stress is your body's response to a hard situation. Your body can have a physical, emotional, or mental response. Stress is a fact of life for most people, and it  affects everyone differently. What causes stress for you may not be stressful for someone else.  A lot of things can cause stress. You may feel stress when you go on a job interview, take a test, or run a race. This kind of short-term stress is normal and even useful. It can help you if you need to work hard or react quickly. For example, stress can help you finish an important job on time.  Long-term stress is caused by ongoing stressful situations or events. Examples of long-term stress include long-term health problems, ongoing problems at work, or conflicts in your family. Long-term stress can harm your health.  How does stress affect your health?  When you are stressed, your body responds as though you are in danger. It makes hormones that speed up your heart, make you breathe faster, and give you a burst of energy. This is called the fight-or-flight stress response. If the stress is over quickly, your body goes back to normal and no harm is done.  But if stress happens too often or lasts too long, it can have bad effects. Long-term stress can make you more likely to get sick, and it can make symptoms of some diseases worse. If you tense up when you are stressed, you may develop neck, shoulder, or low back pain. Stress is linked to high blood pressure and heart disease.  Stress also harms your emotional health. It can make you [place], tense, or depressed. Your relationships may suffer, and you may not do well at work or school.  What can you do to manage stress?  You can try these things to help manage stress:   Do something active. Exercise or activity can help reduce stress. Walking is a great way to get started. Even everyday activities such as housecleaning or yard work can help.  Try yoga or deondre chi. These techniques combine exercise and meditation. You may need some training at first to learn them.  Do something you enjoy. For example, listen to music or go to a movie. Practice your hobby or do volunteer  "work.  Meditate. This can help you relax, because you are not worrying about what happened before or what may happen in the future.  Do guided imagery. Imagine yourself in any setting that helps you feel calm. You can use online videos, books, or a teacher to guide you.  Do breathing exercises. For example:  From a standing position, bend forward from the waist with your knees slightly bent. Let your arms dangle close to the floor.  Breathe in slowly and deeply as you return to a standing position. Roll up slowly and lift your head last.  Hold your breath for just a few seconds in the standing position.  Breathe out slowly and bend forward from the waist.  Let your feelings out. Talk, laugh, cry, and express anger when you need to. Talking with supportive friends or family, a counselor, or a jody leader about your feelings is a healthy way to relieve stress. Avoid discussing your feelings with people who make you feel worse.  Write. It may help to write about things that are bothering you. This helps you find out how much stress you feel and what is causing it. When you know this, you can find better ways to cope.  What can you do to prevent stress?  You might try some of these things to help prevent stress:  Manage your time. This helps you find time to do the things you want and need to do.  Get enough sleep. Your body recovers from the stresses of the day while you are sleeping.  Get support. Your family, friends, and community can make a difference in how you experience stress.  Limit your news feed. Avoid or limit time on social media or news that may make you feel stressed.  Do something active. Exercise or activity can help reduce stress. Walking is a great way to get started.  Where can you learn more?  Go to https://www.Quizrr.net/patiented  Enter N032 in the search box to learn more about \"Learning About Stress.\"  Current as of: October 24, 2023  Content Version: 14.3    2024 HERMEL DELOR. "   Care instructions adapted under license by your healthcare professional. If you have questions about a medical condition or this instruction, always ask your healthcare professional. [employer] disclaims any warranty or liability for your use of this information.    Substance Use Disorder: Care Instructions  Overview     You can improve your life and health by stopping your use of alcohol or drugs. When you don't drink or use drugs, you may feel and sleep better. You may get along better with your family, friends, and coworkers. There are medicines and programs that can help with substance use disorder.  How can you care for yourself at home?  Here are some ways to help you stay sober and prevent relapse.  If you have been given medicine to help keep you sober or reduce your cravings, be sure to take it exactly as prescribed.  Talk to your doctor about programs that can help you stop using drugs or drinking alcohol.  Do not keep alcohol or drugs in your home.  Plan ahead. Think about what you'll say if other people ask you to drink or use drugs. Try not to spend time with people who drink or use drugs.  Use the time and money spent on drinking or drugs to do something that's important to you.  Preventing a relapse  Have a plan to deal with relapse. Learn to recognize changes in your thinking that lead you to drink or use drugs. Get help before you start to drink or use drugs again.  Try to stay away from situations, friends, or places that may lead you to drink or use drugs.  If you feel the need to drink alcohol or use drugs again, seek help right away. Call a trusted friend or family member. Some people get support from organizations such as Narcotics Anonymous or [employer] or from treatment facilities.  If you relapse, get help as soon as you can. Some people make a plan with another person that outlines what they want that person to do for them if they relapse. The plan usually includes how to  handle the relapse and who to notify in case of relapse.  Don't give up. Remember that a relapse doesn't mean that you have failed. Use the experience to learn the triggers that lead you to drink or use drugs. Then quit again. Recovery is a lifelong process. Many people have several relapses before they are able to quit for good.  Follow-up care is a key part of your treatment and safety. Be sure to make and go to all appointments, and call your doctor if you are having problems. It's also a good idea to know your test results and keep a list of the medicines you take.  When should you call for help?   Call 911  anytime you think you may need emergency care. For example, call if you or someone else:    Has overdosed or has withdrawal signs. Be sure to tell the emergency workers that you are or someone else is using or trying to quit using drugs. Overdose or withdrawal signs may include:  Losing consciousness.  Seizure.  Seeing or hearing things that aren't there (hallucinations).     Is thinking or talking about suicide or harming others.   Where to get help 24 hours a day, 7 days a week   If you or someone you know talks about suicide, self-harm, a mental health crisis, a substance use crisis, or any other kind of emotional distress, get help right away. You can:    Call the Suicide and Crisis Lifeline at 323.     Call 7-500-747-TEAS (1-449.993.1279).     Text HOME to 210386 to access the Crisis Text Line.   Consider saving these numbers in your phone.  Go to Rewalon.org for more information or to chat online.  Call your doctor now or seek immediate medical care if:    You are having withdrawal symptoms. These may include nausea or vomiting, sweating, shakiness, and anxiety.   Watch closely for changes in your health, and be sure to contact your doctor if:    You have a relapse.     You need more help or support to stop.   Where can you learn more?  Go to https://www.healthwise.net/patiented  Enter H573 in the  "search box to learn more about \"Substance Use Disorder: Care Instructions.\"  Current as of: November 15, 2023  Content Version: 14.3    2024 [employer].   Care instructions adapted under license by your healthcare professional. If you have questions about a medical condition or this instruction, always ask your healthcare professional. [employer] disclaims any warranty or liability for your use of this information.       "

## 2025-01-30 NOTE — PROGRESS NOTES
"Preventive Care Visit  [facility]  Geni Whalen MD, Family Medicine  Jan 30, 2025      Assessment & Plan     Routine general medical examination at a health care facility  Encounter for screening mammogram for malignant neoplasm of breast  Discussed normal perimenopausal symptoms.  Mammogram ordered.   Plan for fasting labs (lipid, glucose) next year.  - MA Screening Bilateral w/ Pavel; Future    Recurrent HSV (herpes simplex virus)  The current medical regimen is effective;  continue present plan and medications.  - valACYclovir (VALTREX) 500 MG tablet; Take 1 tablet (500 mg) by mouth daily.    Anxiety and depression  The current medical regimen is effective;  continue present plan and medications.  - sertraline (ZOLOFT) 100 MG tablet; Take 1 tablet (100 mg) by mouth daily.    Wheezing  The current medical regimen is effective;  continue present plan and medications.  - VENTOLIN  (90 Base) MCG/ACT inhaler; Inhale 1-2 puffs into the lungs every 6 hours as needed for shortness of breath, wheezing or cough.    Counseling  Appropriate preventive services were addressed with this patient via screening, questionnaire, or discussion as appropriate for fall prevention, nutrition, physical activity, Tobacco-use cessation, social engagement, weight loss and cognition.  Checklist reviewing preventive services available has been given to the patient.  Reviewed patient's diet, addressing concerns and/or questions.   She is at risk for psychosocial distress and has been provided with information to reduce risk.     Return in about 53 weeks (around 2/5/2026) for Annual Wellness Visit.    Kyle Arceo is a 49 year old, presenting for the following:  Physical           HPI    Perimenopause -  - A lot more PMS symptoms  - Breast tenderness related to cycles  - Hormonal acne   - Headaches related to PMS  - \"I feel like crap\" the day her period starts  - Still having pretty regular cycles except for one time in " the past year when her period was 2 weeks late  - Cycles are pretty light    Anxiety and depression. Long-standing diagnosis.   - Medications: Has been treating with sertraline 100 mg daily. No adverse medication effects.   - Counseling: Patient is currently seeing a therapist/counselor -- Shawn Ullrich. Thinks she is getting close to being ready to graduate from therapy.   - Mother is on sertraline as well.   - Past medication trials: escitalopram caused sleepiness.      Genital HSV. Takes valacyclovir 500 mg daily for suppression and working well.     Seasonal allergies. Uses albuterol occasionally in the spring for wheezing triggered by seasonal allergies. Uses Flonase and Claritin OTC as needed in the spring and fall.     Migraines. Hasn't needed sumatriptan in awhile.         1/27/2025   General Health   How would you rate your overall physical health? Good   Feel stress (tense, anxious, or unable to sleep) Very much   (!) STRESS CONCERN      1/27/2025   Nutrition   Three or more servings of calcium each day? Yes   Diet: Regular (no restrictions)   How many servings of fruit and vegetables per day? (!) 2-3   How many sweetened beverages each day? 0-1         1/27/2025   Exercise   Days per week of moderate/strenous exercise 4 days   Average minutes spent exercising at this level 20 min         1/27/2025   Social Factors   Frequency of gathering with friends or relatives Twice a week   Worry food won't last until get money to buy more No   Food not last or not have enough money for food? No   Do you have housing? (Housing is defined as stable permanent housing and does not include staying ouside in a car, in a tent, in an abandoned building, in an overnight shelter, or couch-surfing.) Yes   Are you worried about losing your housing? No   Lack of transportation? No   Unable to get utilities (heat,electricity)? No         1/27/2025   Dental   Dentist two times every year? Yes         1/27/2025   TB Screening    Were you born outside of the [place]? No     PHQ-9 Score:       1/7/2025     4:21 PM   PHQ-9 SCORE   PHQ-9 Total Score MyChart 10 (Moderate depression)   PHQ-9 Total Score 10        Patient-reported           1/27/2025   Substance Use   Alcohol more than 3/day or more than 7/wk No   Do you use any other substances recreationally? (!) CANNABIS PRODUCTS     Social History     Tobacco Use    Smoking status: Never    Smokeless tobacco: Never   Vaping Use    Vaping status: Never Used   Substance Use Topics    Alcohol use: Yes     Comment: Moderate; glass of wine with dinner sometimes. 2-3 in a sitting.    Drug use: Yes     Types: Marijuana     Comment: Edible           3/13/2023   LAST FHS-7 RESULTS   1st degree relative breast or ovarian cancer No   Any relative bilateral breast cancer No   Any male have breast cancer No   Any ONE woman have BOTH breast AND ovarian cancer No   Any woman with breast cancer before 50yrs No   2 or more relatives with breast AND/OR ovarian cancer No   2 or more relatives with breast AND/OR bowel cancer No       Mammogram Screening - Mammogram every 1-2 years updated in Health Maintenance based on mutual decision making        1/27/2025   STI Screening   New sexual partner(s) since last STI/HIV test? No     History of abnormal Pap smear: Yes, about 20 years ago and normal on follow-up Pap        ASCVD Risk   The 10-year ASCVD risk score (Kell ESCOBAR, et al., 2019) is: 0.8%    Values used to calculate the score:      Age: 49 years      Sex: Female      Is Non- : No      Diabetic: No      Tobacco smoker: No      Systolic Blood Pressure: 114 mmHg      Is BP treated: No      HDL Cholesterol: 62 mg/dL      Total Cholesterol: 225 mg/dL        1/27/2025   Contraception/Family Planning   Questions about contraception or family planning No        Reviewed and updated as needed this visit by Provider      Problems  Med Hx  Surg Hx  Fam Hx               Objective   "  Exam  /75   Pulse 70   Temp 97.7  F (36.5  C)   Ht 1.604 m (5' 3.15\")   Wt 62.6 kg (138 lb 1.3 oz)   LMP 01/07/2025   SpO2 97%   BMI 24.34 kg/m       Physical Exam  GENERAL: alert and no distress  EYES: Eyes grossly normal to inspection, PERRL and conjunctivae and sclerae normal  HENT: ear canals and TM's normal, nose and mouth without ulcers or lesions  NECK: no adenopathy, no asymmetry, masses, or scars  RESP: lungs clear to auscultation - no rales, rhonchi or wheezes  CV: regular rate and rhythm, normal S1 S2, no S3 or S4, no murmur, click or rub, no peripheral edema  ABDOMEN: soft, nontender, without hepatosplenomegaly or masses  MS: no gross musculoskeletal defects noted, no edema  SKIN: no suspicious lesions or rashes  NEURO: Normal strength and tone, mentation intact and speech normal  PSYCH: mentation appears normal, affect normal/bright      Signed Electronically by: Geni Whalen MD  "

## 2025-01-30 NOTE — TELEPHONE ENCOUNTER
Pt has appointment today with Dr. Whalen, refill can be sent at that time.    Enrique MARRERO, RN  01/30/25 9:11 AM

## 2025-02-10 ASSESSMENT — ANXIETY QUESTIONNAIRES
3. WORRYING TOO MUCH ABOUT DIFFERENT THINGS: NEARLY EVERY DAY
IF YOU CHECKED OFF ANY PROBLEMS ON THIS QUESTIONNAIRE, HOW DIFFICULT HAVE THESE PROBLEMS MADE IT FOR YOU TO DO YOUR WORK, TAKE CARE OF THINGS AT HOME, OR GET ALONG WITH OTHER PEOPLE: VERY DIFFICULT
8. IF YOU CHECKED OFF ANY PROBLEMS, HOW DIFFICULT HAVE THESE MADE IT FOR YOU TO DO YOUR WORK, TAKE CARE OF THINGS AT HOME, OR GET ALONG WITH OTHER PEOPLE?: VERY DIFFICULT
1. FEELING NERVOUS, ANXIOUS, OR ON EDGE: NEARLY EVERY DAY
7. FEELING AFRAID AS IF SOMETHING AWFUL MIGHT HAPPEN: NEARLY EVERY DAY
GAD7 TOTAL SCORE: 15
GAD7 TOTAL SCORE: 15
2. NOT BEING ABLE TO STOP OR CONTROL WORRYING: NEARLY EVERY DAY
GAD7 TOTAL SCORE: 15
4. TROUBLE RELAXING: MORE THAN HALF THE DAYS
5. BEING SO RESTLESS THAT IT IS HARD TO SIT STILL: SEVERAL DAYS
6. BECOMING EASILY ANNOYED OR IRRITABLE: NOT AT ALL
7. FEELING AFRAID AS IF SOMETHING AWFUL MIGHT HAPPEN: NEARLY EVERY DAY

## 2025-02-10 NOTE — PROGRESS NOTES
[facility]      PATIENT'S NAME: Marielos Marti  PREFERRED NAME: Marielos  PRONOUNS:  she/her    MRN: [id number]  : 1975  ADDRESS: [address]  [facility] [address]  ACCT. NUMBER:  [id number]  DATE OF SERVICE: 25  PREFERRED PHONE: [phone]  May we leave a program related message: Yes  SERVICE MODALITY:  Video Visit:      Provider verified identity through the following two step process.  Patient provided:  Patient  and Patient address    Telemedicine Visit: The patient's condition can be safely assessed and treated via synchronous audio and visual telemedicine encounter.      Reason for Telemedicine Visit: Patient has requested telehealth visit    Originating Site (Patient Location): Patient's home    Distant Site (Provider Location): [facility]    Consent:  The patient/guardian has verbally consented to: the potential risks and benefits of telemedicine (video visit) versus in person care; bill my insurance or make self-payment for services provided; and responsibility for payment of non-covered services.     Patient would like the video invitation sent by:  My Chart    Mode of Communication:  Video Conference via [employer]well    Distant Location (Provider):  On-site    As the provider I attest to compliance with applicable laws and regulations related to telemedicine.      Behavioral Health Clinician Progress Note    Patient Name: Marielos Marti           Service Type:  Individual      Service Location:   Telehealth; see above     Session Start Time: 4:03 pm  Session End Time:  4:56 pm      Session Length: 53 - 60      Attendees: Patient    Visit Activities (Refresh list every visit): [facility] Only    Diagnostic Assessment Date: 24  Treatment Plan Review Date: 25  CGI Review Date: 25  Promis 10 Review Date: 25    Clinical Global Impressions  First:  Considering your total clinical experience with this particular patient population, how  "severe are the patient's symptoms at this time?: 4 (1/8/2025  4:00 PM)    Most recent:  Compared to the patient's condition at the START of treatment, this patient's condition is: 4 (1/8/2025  4:00 PM)      See Flowsheets for today's PHQ-9 and MARY-7 results  Previous PHQ-9:       12/4/2024    10:13 AM 1/7/2025     4:21 PM 2/10/2025     4:26 PM   PHQ-9 SCORE   PHQ-9 Total Score MyChart 10 (Moderate depression) 10 (Moderate depression) 13 (Moderate depression)   PHQ-9 Total Score 10  10  13        Patient-reported     Previous MARY-7:       12/4/2024    10:15 AM 1/7/2025     4:21 PM 2/10/2025     4:27 PM   MARY-7 SCORE   Total Score 15 (severe anxiety) 14 (moderate anxiety) 15 (severe anxiety)   Total Score 15  14  15        Patient-reported       DATA  Extended Session (60+ minutes): No  Interactive Complexity: No  Crisis: No  H Patient: No    Treatment Objective(s) Addressed in This Session:  Target Behavior(s):  increase socialization and activity level, and be more present    Depressed Mood: Increase interest, engagement, and pleasure in doing things  Decrease frequency and intensity of feeling down, depressed, hopeless  Feel less tired and more energy during the day   Identify negative self-talk and behaviors: challenge core beliefs, myths, and actions  Anxiety: will experience a reduction in anxiety, will develop more effective coping skills to manage anxiety symptoms, will develop healthy cognitive patterns and beliefs and will increase ability to function adaptively    Current Stressors / Issues:  Marielos presented with low and anxious mood, stating, \"I'm so worried about everything...I'm sleeping about 4 hrs a night\".  Marielos reported high anxiety about politics, but the worry/stress cannot be escaped because politics has permeated the news, social media, culture, etc.  Marielos also reported the winter has been difficult, noting the cold weather and lack of snow has resulted in decreased desire to go outside " "and increased desire to sleep/hibernate.  [facility] used active listening to reflect symptoms and validate her experience, and then used Sol'n Focused interventions to explore and identify coping strategies.  Marielos reported the following    Strategies:  -\"Trying to disengage from the news cycle\"  -planning to attend Master  class  -did some Baking over the weekend  -her and partner set a goal of exercising every other day (lunch time, in the home)  -its helped a lot with energy  -feels physically better  -likes the support and accountability of doing with her partner    Area for improvement/progress  -be more active outside    In regard to work related stress, Marielos reported while the bad news is there's another person from her team that's unexpectedly retiring, the good news this has finally gotten the attention of management (including director) and potential changes are coming.  Marielos reported she recently had a meeting with her immediate supervisor, who acknowledged morale is low and this was the first time in a long time in which she felt \"seen and heard\" by her boss.  Marielos reported there will possibly new opportunities for growth, which would also be very welcomed by Marielos and her colleagues.  And Deanna has a scheduled meeting with the Director next week, during which she will advocate for changes.  Lastly, [facility] and Marielos addressed the issue of being asked to perform work outside of her scope.   Marielos reported she's talked to colleagues, and she's doing a better at setting boundaries in a professional manner regarding work requests.   Marielos reported she's now more confident in setting boundaries and seeing how the lack of boundaries leads to burn out.        Progress on Treatment Objective(s) / Homework:  No improvement - ACTION (Actively working towards change); Intervened by reinforcing change plan / affirming steps taken    Motivational Interviewing    MI Intervention: Co-Developed Goal: " Increase socialization and activity level; be more present, Expressed Empathy/Understanding, Supported Autonomy, Collaboration, Evocation, Open-ended questions, Reflections: simple and complex and Change talk (evoked)     Change Talk Expressed by the Patient: Desire to change Ability to change Reasons to change Need to change    Provider Response to Change Talk: E - Evoked more info from patient about behavior change, A - Affirmed patient's thoughts, decisions, or attempts at behavior change, R - Reflected patient's change talk and S - Summarized patient's change talk statements    Solution-Focused Therapy  Explore patterns in patient's behaviors and relationships and discuss options for new behaviors  Explore new options for problem-solving, communication, managing stress, etc.    Care Plan review completed: Yes    Medication Review:  No changes to current psychiatric medication(s)    Medication Compliance:  Yes    Changes in Health Issues:   None reported    Chemical Use Review:   Substance Use: Chemical use reviewed, no active concerns identified      Tobacco Use: No current tobacco use.      Assessment: Current Emotional / Mental Status (status of significant symptoms):  Risk status (Self / Other harm or suicidal ideation)  Patient has had a history of suicidal ideation: passive, never intent or plan; no SIB, no SA; most recent passive SI was years ago  Patient denies current fears or concerns for personal safety.  Patient denies current or recent suicidal ideation or behaviors.  Patient denies current or recent homicidal ideation or behaviors.  Patient denies current or recent self injurious behavior or ideation.  Patient denies other safety concerns.  A safety and risk management plan has not been developed at this time, however patient was encouraged to call [facility] should there be a change in any of these risk factors.    Appearance:   Appropriate   Eye Contact:   Good   Psychomotor  Behavior: Normal   Attitude:   Cooperative   Orientation:   All  Speech   Rate / Production: Normal    Volume:  Normal   Mood:    Anxious ,tired  Affect:    Worrisome   Thought Content:  Clear   Thought Form:  Coherent  Logical   Insight:    Good     Diagnoses:  1. MARY (generalized anxiety disorder)          Collateral Reports Completed:  Routed note to PCP    Plan: (Homework, other):  Patient was given information about behavioral services and encouraged to schedule a follow up appointment with the clinic [facility] in 1 month.  She was also given information about mental health symptoms and treatment options .  CD Recommendations: No indications of CD issues.   Shawn Ullrich [facility], [facility]      ______________________________________________________________________          Individual Treatment Plan    Patient's Name: Marielos Marti   YOB: 1975  Date of Creation: 1/8/25  Date Treatment Plan Last Reviewed/Revised: 1/8/25    DSM5 Diagnoses: 296.32 (F33.1) Major Depressive Disorder, Recurrent Episode, Moderate _ or 300.02 (F41.1) Generalized Anxiety Disorder  Psychosocial / Contextual Factors: Occupational Issues and Limited Social Support; , Partnered, heterosexual Female; full-time employment    Cultural Influences that will aid in the patient's recovery and enhance resiliency: Pt is from [facility], family resides on [facility]--support maintaining family connection    Considerations specific to safety concerns (eg suicidal, homicidal risks): none     How are Patient's natural supports included in treatment:   pt declined to include supports in treatment planning     Coordination of Care:   coordinate care with PCP     Review and Revisions of the Treatment Plan (every 180 days):   Treatment plan reviewed with patient; Treatment plan remains current based on the patient's status and progress to date     PROMIS (reviewed every 90 days):   The following assessments were completed by patient for this  "visit:  PROMIS 10-Global Health (only subscores and total score):       5/11/2023     1:41 PM 8/15/2023    12:30 PM 11/21/2023     1:13 PM 2/6/2024    12:51 PM 5/6/2024    10:58 AM 10/8/2024     4:50 PM 1/7/2025     4:22 PM   PROMIS-10 Scores Only   Global Mental Health Score 9 9 9 8 11 11 10    Global Physical Health Score 15 15 15 13 15 15 14    PROMIS TOTAL - SUBSCORES 24 24 24 21 26 26 24        Patient-reported        Referral / Collaboration:  Referral to another professional/service is not indicated at this time..    Anticipated number of session for this episode of care: 9-12 sessions  Anticipation frequency of session: Monthly  Anticipated Duration of each session: 38-52 minutes  Treatment plan will be reviewed in 90 days or when goals have been changed.       MeasurableTreatment Goal(s) related to diagnosis / functional impairment(s)  Goal 1: Patient will improve management of anxiety symptoms as evidenced by reduced frequency, intensity and duration of anxiety symptoms. learn and implement coping skills that result in a reduction of anxiety and nervousness. improve daily functioning. recognize contributing factors of anxiety and identify ways to intervene. enhance ability to effectively cope with life anxieties as they occur.   \"I will know I've met my goal when .\"     Status: New - Date: 1/8/25    Intervention(s)  [facility] will Motivational Interviewing (MI): Validate patient's thoughts, feelings and experience. Express respect for patient's autonomy in decision making.  Ask open-ended questions to invite patient's self-reflection and self-direction around change and what is important for them in working towards their goals.  Express and demonstrate empathy through reflective listening.  Affirm patient's strengths and abilities. Elicit change talk.  Psycho-education: Provide psycho-education about patient's behavioral health condition and symptoms. Explain and reviewed treatment options. Teach boundary " clarification and setting to develop and maintain healthy relationships. Teach and practice use of assertiveness skills.  Acceptance and Commitment Therapy (ACT): Work with patient to identify values and the ways in which their behaviors are inconsistent with those values. Work with patient to identify behaviors they could engage in to live in a manner that is more consistent with their values. Work with patient to practice contact with the present moment: engaging fully with here-and-now experience, with an attitude of openness and curiosity.  Mindfulness: Work with patient to identify automatic thoughts and judgments that occur in response to thoughts and emotions.  Provide education and guidance for the development of increased capacity for mindfulness and self-awareness.   Provide psycho-education around developing mindfulness strategies.  Work with patient to develop a plan for increased mindfulness outside of sessions.  .      Patient has reviewed and agreed to the above plan.      Shawn G. Ullrich, MARCE, [facility]  January 8, 2025

## 2025-02-11 ENCOUNTER — VIRTUAL VISIT (OUTPATIENT)
Dept: BEHAVIORAL HEALTH | Facility: CLINIC | Age: 50
End: 2025-02-11
Payer: COMMERCIAL

## 2025-02-11 DIAGNOSIS — F41.1 GAD (GENERALIZED ANXIETY DISORDER): Primary | ICD-10-CM

## (undated) DEVICE — LINEN TOWEL PACK X5 5464

## (undated) DEVICE — PREP CHLORAPREP 26ML TINTED ORANGE  260815

## (undated) DEVICE — GLOVE BIOGEL PI MICRO INDICATOR UNDERGLOVE SZ 7.5 48975

## (undated) DEVICE — PACK MINOR CUSTOM ASC

## (undated) DEVICE — GLOVE BIOGEL PI MICRO SZ 7.0 48570

## (undated) DEVICE — SUCTION TIP YANKAUER W/O VENT K86

## (undated) DEVICE — ESU CORD BIPOLAR GREEN 10-4000

## (undated) DEVICE — SUCTION MANIFOLD NEPTUNE 2 SYS 4 PORT 0702-020-000

## (undated) DEVICE — ESU GROUND PAD ADULT W/CORD E7507

## (undated) DEVICE — DRSG STERI STRIP 1/2X4" R1547

## (undated) DEVICE — STPL SKIN 35W 059037

## (undated) DEVICE — Device

## (undated) DEVICE — LINEN TOWEL PACK X6 WHITE 5487

## (undated) DEVICE — PAD CHUX UNDERPAD 30X36" P3036C

## (undated) DEVICE — SOL WATER IRRIG 500ML BOTTLE 2F7113

## (undated) RX ORDER — DEXAMETHASONE SODIUM PHOSPHATE 4 MG/ML
INJECTION, SOLUTION INTRA-ARTICULAR; INTRALESIONAL; INTRAMUSCULAR; INTRAVENOUS; SOFT TISSUE
Status: DISPENSED
Start: 2023-08-03

## (undated) RX ORDER — LIDOCAINE HYDROCHLORIDE 10 MG/ML
INJECTION, SOLUTION EPIDURAL; INFILTRATION; INTRACAUDAL; PERINEURAL
Status: DISPENSED
Start: 2023-08-03

## (undated) RX ORDER — PROPOFOL 10 MG/ML
INJECTION, EMULSION INTRAVENOUS
Status: DISPENSED
Start: 2023-08-03

## (undated) RX ORDER — FENTANYL CITRATE 50 UG/ML
INJECTION, SOLUTION INTRAMUSCULAR; INTRAVENOUS
Status: DISPENSED
Start: 2023-08-03

## (undated) RX ORDER — CEFAZOLIN SODIUM 2 G/50ML
SOLUTION INTRAVENOUS
Status: DISPENSED
Start: 2023-08-03

## (undated) RX ORDER — ACETAMINOPHEN 325 MG/1
TABLET ORAL
Status: DISPENSED
Start: 2023-08-03

## (undated) RX ORDER — ONDANSETRON 2 MG/ML
INJECTION INTRAMUSCULAR; INTRAVENOUS
Status: DISPENSED
Start: 2023-08-03